# Patient Record
Sex: MALE | Race: WHITE | NOT HISPANIC OR LATINO | Employment: OTHER | URBAN - METROPOLITAN AREA
[De-identification: names, ages, dates, MRNs, and addresses within clinical notes are randomized per-mention and may not be internally consistent; named-entity substitution may affect disease eponyms.]

---

## 2018-08-21 ENCOUNTER — OFFICE VISIT CONVERTED (OUTPATIENT)
Dept: ORTHOPEDIC SURGERY | Facility: CLINIC | Age: 58
End: 2018-08-21
Attending: ORTHOPAEDIC SURGERY

## 2018-09-20 ENCOUNTER — OFFICE VISIT CONVERTED (OUTPATIENT)
Dept: ORTHOPEDIC SURGERY | Facility: CLINIC | Age: 58
End: 2018-09-20
Attending: PHYSICIAN ASSISTANT

## 2018-09-20 ENCOUNTER — CONVERSION ENCOUNTER (OUTPATIENT)
Dept: ORTHOPEDIC SURGERY | Facility: CLINIC | Age: 58
End: 2018-09-20

## 2018-10-04 ENCOUNTER — OFFICE VISIT CONVERTED (OUTPATIENT)
Dept: ORTHOPEDIC SURGERY | Facility: CLINIC | Age: 58
End: 2018-10-04
Attending: PHYSICIAN ASSISTANT

## 2018-10-24 ENCOUNTER — OFFICE VISIT CONVERTED (OUTPATIENT)
Dept: SURGERY | Facility: CLINIC | Age: 58
End: 2018-10-24
Attending: SURGERY

## 2018-11-15 ENCOUNTER — OFFICE VISIT CONVERTED (OUTPATIENT)
Dept: ORTHOPEDIC SURGERY | Facility: CLINIC | Age: 58
End: 2018-11-15
Attending: PHYSICIAN ASSISTANT

## 2019-03-07 ENCOUNTER — OFFICE VISIT CONVERTED (OUTPATIENT)
Dept: ORTHOPEDIC SURGERY | Facility: CLINIC | Age: 59
End: 2019-03-07
Attending: ORTHOPAEDIC SURGERY

## 2019-03-07 ENCOUNTER — CONVERSION ENCOUNTER (OUTPATIENT)
Dept: ORTHOPEDIC SURGERY | Facility: CLINIC | Age: 59
End: 2019-03-07

## 2019-06-12 ENCOUNTER — HOSPITAL ENCOUNTER (OUTPATIENT)
Dept: FAMILY MEDICINE CLINIC | Facility: CLINIC | Age: 59
Discharge: HOME | End: 2019-06-12
Attending: FAMILY MEDICINE

## 2019-06-12 ENCOUNTER — CONVERSION ENCOUNTER (OUTPATIENT)
Dept: FAMILY MEDICINE CLINIC | Facility: CLINIC | Age: 59
End: 2019-06-12

## 2019-06-12 ENCOUNTER — OFFICE VISIT CONVERTED (OUTPATIENT)
Dept: FAMILY MEDICINE CLINIC | Facility: CLINIC | Age: 59
End: 2019-06-12
Attending: FAMILY MEDICINE

## 2019-06-12 LAB
ALBUMIN SERPL-MCNC: 4.5 G/DL (ref 3.5–5)
ALBUMIN/GLOB SERPL: 1.9 {RATIO} (ref 1.4–2.6)
ALP SERPL-CCNC: 67 U/L (ref 56–119)
ALT SERPL-CCNC: 16 U/L (ref 10–40)
ANION GAP SERPL CALC-SCNC: 19 MMOL/L (ref 8–19)
AST SERPL-CCNC: 18 U/L (ref 15–50)
BASOPHILS # BLD AUTO: 0.03 10*3/UL (ref 0–0.2)
BASOPHILS NFR BLD AUTO: 0.3 % (ref 0–3)
BILIRUB SERPL-MCNC: 0.39 MG/DL (ref 0.2–1.3)
BUN SERPL-MCNC: 18 MG/DL (ref 5–25)
BUN/CREAT SERPL: 16 {RATIO} (ref 6–20)
CALCIUM SERPL-MCNC: 9.4 MG/DL (ref 8.7–10.4)
CHLORIDE SERPL-SCNC: 103 MMOL/L (ref 99–111)
CHOLEST SERPL-MCNC: 193 MG/DL (ref 107–200)
CHOLEST/HDLC SERPL: 4.3 {RATIO} (ref 3–6)
CONV ABS IMM GRAN: 0.05 10*3/UL (ref 0–0.2)
CONV CO2: 25 MMOL/L (ref 22–32)
CONV IMMATURE GRAN: 0.5 % (ref 0–1.8)
CONV TOTAL PROTEIN: 6.9 G/DL (ref 6.3–8.2)
CREAT UR-MCNC: 1.1 MG/DL (ref 0.7–1.2)
DEPRECATED RDW RBC AUTO: 42.1 FL (ref 35.1–43.9)
EOSINOPHIL # BLD AUTO: 0 % (ref 0–7)
EOSINOPHIL # BLD AUTO: 0 10*3/UL (ref 0–0.7)
ERYTHROCYTE [DISTWIDTH] IN BLOOD BY AUTOMATED COUNT: 12.3 % (ref 11.6–14.4)
GFR SERPLBLD BASED ON 1.73 SQ M-ARVRAT: >60 ML/MIN/{1.73_M2}
GLOBULIN UR ELPH-MCNC: 2.4 G/DL (ref 2–3.5)
GLUCOSE SERPL-MCNC: 102 MG/DL (ref 70–99)
HBA1C MFR BLD: 15.1 G/DL (ref 14–18)
HCT VFR BLD AUTO: 46.4 % (ref 42–52)
HDLC SERPL-MCNC: 45 MG/DL (ref 40–60)
LDLC SERPL CALC-MCNC: 126 MG/DL (ref 70–100)
LYMPHOCYTES # BLD AUTO: 3.14 10*3/UL (ref 1–5)
MCH RBC QN AUTO: 30.4 PG (ref 27–31)
MCHC RBC AUTO-ENTMCNC: 32.5 G/DL (ref 33–37)
MCV RBC AUTO: 93.4 FL (ref 80–96)
MONOCYTES # BLD AUTO: 0.67 10*3/UL (ref 0.2–1.2)
MONOCYTES NFR BLD AUTO: 7 % (ref 3–10)
NEUTROPHILS # BLD AUTO: 5.69 10*3/UL (ref 2–8)
NEUTROPHILS NFR BLD AUTO: 59.4 % (ref 30–85)
NRBC CBCN: 0 % (ref 0–0.7)
OSMOLALITY SERPL CALC.SUM OF ELEC: 298 MOSM/KG (ref 273–304)
PLATELET # BLD AUTO: 239 10*3/UL (ref 130–400)
PMV BLD AUTO: 11.8 FL (ref 9.4–12.4)
POTASSIUM SERPL-SCNC: 3.8 MMOL/L (ref 3.5–5.3)
PSA SERPL-MCNC: 0.43 NG/ML (ref 0–4)
RBC # BLD AUTO: 4.97 10*6/UL (ref 4.7–6.1)
SODIUM SERPL-SCNC: 143 MMOL/L (ref 135–147)
TRIGL SERPL-MCNC: 108 MG/DL (ref 40–150)
VARIANT LYMPHS NFR BLD MANUAL: 32.8 % (ref 20–45)
VLDLC SERPL-MCNC: 22 MG/DL (ref 5–37)
WBC # BLD AUTO: 9.58 10*3/UL (ref 4.8–10.8)

## 2019-06-13 LAB — HCV AB SER DONR QL: <0.1 S/CO RATIO (ref 0–0.9)

## 2019-07-01 ENCOUNTER — HOSPITAL ENCOUNTER (OUTPATIENT)
Dept: URGENT CARE | Facility: CLINIC | Age: 59
Discharge: HOME | End: 2019-07-01
Attending: EMERGENCY MEDICINE

## 2019-08-21 ENCOUNTER — HOSPITAL ENCOUNTER (OUTPATIENT)
Dept: URGENT CARE | Facility: CLINIC | Age: 59
Discharge: HOME | End: 2019-08-21

## 2019-09-09 ENCOUNTER — HOSPITAL ENCOUNTER (OUTPATIENT)
Dept: URGENT CARE | Facility: CLINIC | Age: 59
Discharge: HOME | End: 2019-09-09
Attending: EMERGENCY MEDICINE

## 2019-12-31 ENCOUNTER — CONVERSION ENCOUNTER (OUTPATIENT)
Dept: ORTHOPEDIC SURGERY | Facility: CLINIC | Age: 59
End: 2019-12-31

## 2019-12-31 ENCOUNTER — OFFICE VISIT CONVERTED (OUTPATIENT)
Dept: ORTHOPEDIC SURGERY | Facility: CLINIC | Age: 59
End: 2019-12-31
Attending: PHYSICIAN ASSISTANT

## 2020-01-14 ENCOUNTER — OFFICE VISIT CONVERTED (OUTPATIENT)
Dept: ORTHOPEDIC SURGERY | Facility: CLINIC | Age: 60
End: 2020-01-14
Attending: PHYSICIAN ASSISTANT

## 2020-01-21 ENCOUNTER — HOSPITAL ENCOUNTER (OUTPATIENT)
Dept: URGENT CARE | Facility: CLINIC | Age: 60
Discharge: HOME | End: 2020-01-21

## 2020-07-21 ENCOUNTER — OFFICE VISIT CONVERTED (OUTPATIENT)
Dept: ORTHOPEDIC SURGERY | Facility: CLINIC | Age: 60
End: 2020-07-21
Attending: ORTHOPAEDIC SURGERY

## 2020-09-17 ENCOUNTER — HOSPITAL ENCOUNTER (OUTPATIENT)
Dept: URGENT CARE | Facility: CLINIC | Age: 60
Discharge: HOME | End: 2020-09-17
Attending: EMERGENCY MEDICINE

## 2020-09-20 LAB — SARS-COV-2 RNA SPEC QL NAA+PROBE: NOT DETECTED

## 2020-10-22 ENCOUNTER — OFFICE VISIT CONVERTED (OUTPATIENT)
Dept: ORTHOPEDIC SURGERY | Facility: CLINIC | Age: 60
End: 2020-10-22
Attending: ORTHOPAEDIC SURGERY

## 2020-11-16 ENCOUNTER — OFFICE VISIT CONVERTED (OUTPATIENT)
Dept: FAMILY MEDICINE CLINIC | Facility: CLINIC | Age: 60
End: 2020-11-16
Attending: FAMILY MEDICINE

## 2020-11-16 ENCOUNTER — CONVERSION ENCOUNTER (OUTPATIENT)
Dept: FAMILY MEDICINE CLINIC | Facility: CLINIC | Age: 60
End: 2020-11-16

## 2020-11-19 ENCOUNTER — HOSPITAL ENCOUNTER (OUTPATIENT)
Dept: URGENT CARE | Facility: CLINIC | Age: 60
Discharge: HOME | End: 2020-11-19
Attending: FAMILY MEDICINE

## 2020-11-21 LAB — SARS-COV-2 RNA SPEC QL NAA+PROBE: NOT DETECTED

## 2021-01-21 ENCOUNTER — OFFICE VISIT CONVERTED (OUTPATIENT)
Dept: ORTHOPEDIC SURGERY | Facility: CLINIC | Age: 61
End: 2021-01-21
Attending: PHYSICIAN ASSISTANT

## 2021-02-01 ENCOUNTER — OFFICE VISIT CONVERTED (OUTPATIENT)
Dept: FAMILY MEDICINE CLINIC | Facility: CLINIC | Age: 61
End: 2021-02-01
Attending: FAMILY MEDICINE

## 2021-02-08 ENCOUNTER — OFFICE VISIT CONVERTED (OUTPATIENT)
Dept: SURGERY | Facility: CLINIC | Age: 61
End: 2021-02-08
Attending: SURGERY

## 2021-02-08 ENCOUNTER — CONVERSION ENCOUNTER (OUTPATIENT)
Dept: SURGERY | Facility: CLINIC | Age: 61
End: 2021-02-08

## 2021-02-13 ENCOUNTER — HOSPITAL ENCOUNTER (OUTPATIENT)
Dept: PREADMISSION TESTING | Facility: HOSPITAL | Age: 61
Discharge: HOME | End: 2021-02-13
Attending: SURGERY

## 2021-02-13 LAB — SARS-COV-2 RNA SPEC QL NAA+PROBE: NOT DETECTED

## 2021-02-18 ENCOUNTER — HOSPITAL ENCOUNTER (OUTPATIENT)
Dept: PERIOP | Facility: HOSPITAL | Age: 61
Setting detail: HOSPITAL OUTPATIENT SURGERY
Discharge: HOME | End: 2021-02-18
Attending: SURGERY

## 2021-03-08 ENCOUNTER — OFFICE VISIT CONVERTED (OUTPATIENT)
Dept: SURGERY | Facility: CLINIC | Age: 61
End: 2021-03-08
Attending: SURGERY

## 2021-03-17 ENCOUNTER — HOSPITAL ENCOUNTER (OUTPATIENT)
Dept: URGENT CARE | Facility: CLINIC | Age: 61
Discharge: HOME | End: 2021-03-17
Attending: FAMILY MEDICINE

## 2021-04-27 ENCOUNTER — HOSPITAL ENCOUNTER (OUTPATIENT)
Dept: URGENT CARE | Facility: CLINIC | Age: 61
Discharge: HOME | End: 2021-04-27
Attending: EMERGENCY MEDICINE

## 2021-05-10 NOTE — H&P
History and Physical      Patient Name: Leandro Salcedo   Patient ID: [id number]   Sex: Male   YOB: 1960    Primary Care Provider: Christofer Lagos DO   Referring Provider: Christofer Lagos DO    Visit Date: February 8, 2021    Provider: Stephen Garcia MD   [facility]   Location Address: [address] [place] [address]  [id number]   Location Phone: [phone]          Chief Complaint  · Outpatient History & Physical / Surgical Orders  · Lipoma      History Of Present Illness  Leandro Salcedo is a 60 year old /White male who presents to the office today as a consult from Christofer Lagos DO.      Patient was referred for chest wall subcutaneous masses.  Patient has a mass at his left chest wall in the subcutaneous tissue that he says gives him pain when he lays on his left side.  As will be mentioned in the below exam section, there is a surgical scar over the mass but the patient says he has never had any procedure done at the area.  Patient also has a smaller subcutaneous mass on the right side as well that he wants me to look at and he would like that to be removed too.       Past Medical History  Disease Name Date Onset Notes   Allergies --  --    Arthritis --  --    Colon cancer screening --  --    Headache, cluster --  --    Hernia --  --    High blood pressure --  --    High cholesterol --  --    Migraine --  --    Need for hepatitis C screening test 2017 neg per patient   Need for shingles vaccine done --    Prostate cancer screening --  6/12/2019-PSA 0.43   Renal calculus or stone --  --    Right 1st CMC primary osteoarthritis 07/25/2017 --    Right de Quervain's 07/25/2017 --          Past Surgical History  Procedure Name Date Notes   Ankle surgery --  --    Colonoscopy 2018 --    EYE SURGERY --  --    Inguinal Hernia Repair --  right 04/07/2015   Joint Surgery --  --    Knee replacement, left --  --    Shoulder surgery --  --    Tonsillectomy --  --           Medication List  Name Date Started Instructions   Aimovig Autoinjector 70 mg/mL subcutaneous auto-injector 06/12/2019 inject 1 milliliter (70 mg) by subcutaneous route once a month in the abdomen, thigh, or outer area of upper arm for 30 days   aspirin 81 mg oral tablet,delayed release (DR/EC)  take 1 tablet (81 mg) by oral route once daily   diclofenac sodium 75 mg oral tablet,delayed release (DR/EC) 02/01/2021 take 1 tablet (75 mg) by oral route 2 times per day as needed   Fioricet -40 mg oral capsule  take 1 capsule by oral route every 8 hours as needed   lidocaine 5 % topical adhesive patch,medicated  apply 1 patch by transdermal route once daily (May wear up to 12hours.)   lidocaine 5 % topical cream 02/01/2021 apply to affected area(s) by topical route TID PRN   lisinopril-hydrochlorothiazide 20-25 mg oral tablet  take 1 tablet by oral route once daily   Robaxin 500 mg oral tablet  take 2 tablets by oral route once a day (at bedtime)   simvastatin 10 mg oral tablet  take 1 tablet (10 mg) by oral route once daily in the evening   tramadol 50 mg oral tablet  take 1 tablet by oral route once a day (at bedtime) as needed   Voltaren 1 % topical gel 11/16/2020 apply 2 grams to the affected area(s) by topical route 4 times per day for 30 days         Allergy List  Allergen Name Date Reaction Notes   Tigan --  quit breathing --        Allergies Reconciled  Family Medical History  Disease Name Relative/Age Notes   *Unremarkable  --    No family history of colorectal cancer  --          Social History  Finding Status Start/Stop Quantity Notes   Alcohol Never --/-- --  --    Alcohol Use Never --/-- --  does not drink   lives with spouse --  --/-- --  --     --  --/-- --  3 children   . --  --/-- --  --    Recreational Drug Use Never --/-- --  no   Retired [employer] --  --/-- --  --    Retired. --  --/-- --  --    Tobacco Never --/-- --  never smoker   Working --  --/-- --  --   "        Immunizations  NameDate Admin Mfg Trade Name Lot Number Route Inj VIS Given VIS Publication   Hrdamveig46/04/2019 [facility] Fluzone Quadrivalent [id number] IM LD 10/04/2019    Comments: TOLERATED WELL STABLE         Review of Systems  · Constitutional  o Denies  o : chills, fever  · Gastrointestinal  o Denies  o : nausea, vomiting      Vitals  Date Time BP Position Site L\R Cuff Size HR RR TEMP (F) WT  HT  BMI kg/m2 BSA m2 O2 Sat FR L/min FiO2 HC       02/08/2021 10:03 AM       12  189lbs 4oz 5'  6.5\" 30.09 2.01             Physical Examination  · Constitutional  o Appearance  o : healthy appearing, alert and in no acute distress, reliable historian, wife present in room  · Head and Face  o Head  o :   § Inspection  § : no visable deformities or lesions  · Eyes  o Conjunctivae  o : clear  o Sclerae  o : clear  · Neck  o Inspection/Palpation  o : normal appearance, no masses, trachea midline  · Respiratory  o Respiratory Effort  o : breathing unlabored, respiratory effort appears normal  o Inspection of Chest  o : normal appearance, no retractions  · Cardiovascular  o Heart  o : regular rate and rhythm  · Gastrointestinal  o Abdominal Examination  o :   § Abdomen  § : soft, nontender, nondistended  · Skin and Subcutaneous Tissue  o General Inspection  o : The left lateral chest wall, there is an approximately 2 cm fairly well-circumscribed subcutaneous mass with a surgical scar in the skin over the mass. The mass is tender with palpation. At the right lateral chest wall, there is a palpable subcutaneous mass about 1 cm in diameter that is mobile and well-circumscribed.  · Neurologic  o Cranial Nerves  o : no obvious motor deficits  o Sensation  o : no obvious sensory deficits  o Gait and Station  o :   § Gait Screening  § : normal gait, able to stand without diffculty  o Cerebellar Function  o : no obvious abnormalities  · Psychiatric  o Judgement and Insight  o : judgment and insight intact  o Mood and Affect  o : " mood normal, affect appropriate          Assessment  · Pre-Surgical Orders     V72.84  · Subcutaneous mass     782.2/R22.9  Location is left and right lateral chest wall  · Preop testing     V72.84/Z01.818      Plan  · Orders  o GENERAL SURGERY (GNSUR) - V72.84 - 02/18/2021  o [facility] [place] (11454[place] - V72.84/Z01.818 - 02/13/2021   at 8:15am  · Medications  o Medications have been Reconciled  o Transition of Care or Provider Policy  · Instructions  o PLAN: Excision of subcutaneous mass from right and left lateral chest wall  o PLEASE SIGN PERMIT FOR: Excision of subcutaneous mass from right and left lateral chest wall  o Anesthesia: MAC  o Outpatient  o O.R. PREP: Per protocol  o IV: Per Anesthesia  o SCD's preoperatively  o No antibiotic is needed.  o The indications, options, risks, benefits, and expected outcomes of the planned procedure were discussed with the patient and the patient agrees to proceed.   o Electronically Identified Patient Education Materials Provided Electronically  · Referrals  o ID: [id number] Date: 02/04/2021 Type: Inbound  Specialty: General Surgery            Electronically Signed by: Stephen Garcia MD -Author on February 8, 2021 10:47:08 AM

## 2021-05-13 NOTE — PROGRESS NOTES
Progress Note      Patient Name: Leandro Salcedo   Patient ID: [id number]   Sex: Male   YOB: 1960    Primary Care Provider: Christofer Lagos DO   Referring Provider: Christofer Lagos DO    Visit Date: November 16, 2020    Provider: Christofer Lagos DO   Location: [facility]   Location Address: [address] [place] [address], [address] [place] [address]  [id number]   Location Phone: [phone]          Chief Complaint  · rib pain      History Of Present Illness  Leandro Salcedo is a 60 year old /White male who presents for evaluation and treatment of:      She presents today for an acute visit.  He describes bilateral rib pain.  This started about 2 months ago.  Denies any known injury.  Denies any pain with deep breaths.  He thinks he may have strained a muscle.  Denies any shortness of breath.  He did go to urgent care on 9/17/2020 and had a chest x-ray done which was normal except for some mild degenerative spurring in the thoracic spine with no acute process seen.  There was concern about atypical pneumonia so he was given a Z-Kevin, prednisone, and Motrin which none of it helped.  He did have a Covid test also done which was negative.  He continues to have symptoms.  He tried BenGay as well as Voltaren gel.  He still has some Voltaren gel so is not requesting a prescription today.  Discussed with him getting a rib series.  He is having reproducible tenderness to palpation along the anterior axillary line on the left chest wall and along the posterior axillary line on the right chest wall.       Past Medical History  Allergies; Arthritis; Colon cancer screening; Headache, cluster; Hernia; High blood pressure; High cholesterol; Migraine; Need for hepatitis C screening test; Need for shingles vaccine; Prostate cancer screening; Renal calculus or stone; Right 1st CMC primary osteoarthritis; Right de Quervain's         Past Surgical History  Ankle surgery; Colonoscopy; EYE SURGERY; Inguinal  "Hernia Repair; Joint Surgery; Knee replacement, left; Shoulder surgery; Tonsillectomy         Medication List  Aimovig Autoinjector 70 mg/mL subcutaneous auto-injector; aspirin 81 mg oral tablet,delayed release (DR/EC); diclofenac sodium 75 mg oral tablet,delayed release (DR/EC); Fioricet -40 mg oral capsule; lidocaine 5 % topical adhesive patch,medicated; lidocaine 5 % topical cream; lisinopril-hydrochlorothiazide 20-25 mg oral tablet; Robaxin 500 mg oral tablet; simvastatin 10 mg oral tablet; tramadol 50 mg oral tablet         Allergy List  [facility]         Family Medical History  *Unremarkable; No family history of colorectal cancer         Social History  Alcohol (Never); Alcohol Use (Never); lives with spouse; ; .; Recreational Drug Use (Never); Retired [employer]; Retired.; Tobacco (Never); Working         Immunizations  Name Date Admin   Influenza 10/04/2019         Review of Systems     Gen: Denies any fever, chills, or weight changes  Extremities: Denies edema  Neurologic: Denies any deficits  Skin: Denies any rashes  Musculoskeletal: As discussed above       Vitals  Date Time BP Position Site L\R Cuff Size HR RR TEMP (F) WT  HT  BMI kg/m2 BSA m2 O2 Sat FR L/min FiO2 HC       11/16/2020 03:04 /82 Sitting    55 - R  97.4 189lbs 8oz 5'  6.5\" 30.13 2.01 99 %            Physical Examination     General: AAO 3, no acute distress, pleasant  HEENT: Normocephalic, atraumatic  Cardiovascular: Regular rate and rhythm without appreciable murmur  Respiratory: Clear to auscultation bilaterally no RRW  Musculoskeletal: Reproducible tenderness to palpation on the anterior axillary line on the left ribs in the area of the 6 to the 10th rib.  On the right it is along the posterior axillary line at the level of the eighth rib.  No mass or lesion seen.  His costochondral region is nontender to palpation.  He has no tenderness to palpation over the thoracic spine area.  extremities: No clubbing, cyanosis or " edema  Neurologic: CN II through XII grossly intact   Psychiatric: Normal mood and affect           Assessment  · Rib pain on left side     786.50/R07.81  · Rib pain on right side     786.50/R07.81  · Muscle strain     848.9/T14.8XXA  I suspect a muscle strain as he does have reproducible tenderness. I will get an x-ray to rule out any rib fractures. Discussed setting him up for physical therapy. I will see him back in 2 months for follow-up. I will give patient a prednisone taper to help out with pain/inflammation.      Plan  · Orders  o ACO-39: Current medications updated and reviewed (1159F, ) - - 11/16/2020  o ACO-14: Influenza immunization administered or previously received [facility] () - - 11/16/2020  o Xray ribs with PA chest left [facility] (69562) - 786.50/R07.81 - 11/16/2020   pain started 2 months ago. no known injury. Pain along anterior axillary line on the left  o xray ribs with PA chest right [facility] ([id number]) - 786.50/R07.81 - 11/16/2020   pain started 2 months ago. NO known injury. Pain along posterior axillary line  o PHYSICAL THERAPY CONSULTATION ([facility]) - 786.50/R07.81, 848.9/T14.8XXA - 11/16/2020  · Medications  o Voltaren 1 % topical gel   SIG: apply 2 grams to the affected area(s) by topical route 4 times per day for 30 days   DISP: (100) Gram with 0 refills  Prescribed on 11/16/2020     o prednisone 20 mg oral tablet   SIG: take 2 tablets PO daily x 5 days, then take 1 tablet PO daily x 5 days, then take 1/2 tablet PO daily x 5 days   DISP: (18) Tablet with 0 refills  Prescribed on 11/16/2020     · Instructions  o Patient was educated/instructed on their diagnosis, treatment and medications prior to discharge from the clinic today.  o Patient instructed to seek medical attention urgently for new or worsening symptoms.  o Call the office with any concerns or questions.  · Disposition  o Follow Up in 2 months.            Electronically Signed by: Christofer Lagos DO  -Author on November 16, 2020 03:46:09 PM

## 2021-05-13 NOTE — PROGRESS NOTES
Progress Note      Patient Name: Leandro Salcedo   Patient ID: [id number]   Sex: Male   YOB: 1960    Primary Care Provider: Christofer Lagos DO   Referring Provider: Christofer Lagos DO    Visit Date: July 21, 2020    Provider: Farideh Rucker MD   Location: [facility]   Location Address: [address] [place] [address]  [id number]   Location Phone: [phone]          Chief Complaint  · Follow up right shoulder/wrist pain      History Of Present Illness  Leandro Salcedo is a 60 year old /White male who presents today to [facility]. He is here for evaluation of his right shoulder and right de quervains. He has been having increasing symptoms.       Past Medical History  Allergies; Arthritis; Colon cancer screening; Headache, cluster; Hernia; High blood pressure; High cholesterol; Migraine; Need for hepatitis C screening test; Need for shingles vaccine; Prostate cancer screening; Renal calculus or stone; Right 1st CMC primary osteoarthritis; Right de Quervain's         Past Surgical History  Ankle surgery; Colonoscopy; EYE SURGERY; Inguinal Hernia Repair; Joint Surgery; Knee replacement, left; Shoulder surgery; Tonsillectomy         Medication List  Aimovig Autoinjector 70 mg/mL subcutaneous auto-injector; aspirin 81 mg oral tablet,delayed release (DR/EC); diclofenac sodium 75 mg oral tablet,delayed release (DR/EC); Fioricet -40 mg oral capsule; lidocaine 5 % topical adhesive patch,medicated; lidocaine 5 % topical cream; lisinopril-hydrochlorothiazide 20-25 mg oral tablet; Robaxin 500 mg oral tablet; simvastatin 10 mg oral tablet; tramadol 50 mg oral tablet         Allergy List  Tigan         Family Medical History  *Unremarkable; No family history of colorectal cancer         Social History  Alcohol (Never); Alcohol Use (Never); lives with spouse; ; .; Recreational Drug Use (Never); Retired [employer]; Retired.; Tobacco (Never); Working         Review of  "Systems  · Constitutional  o Denies  o : fever, chills, weight loss  · Cardiovascular  o Denies  o : chest pain, shortness of breath  · Gastrointestinal  o Denies  o : liver disease, heartburn, nausea, blood in stools  · Genitourinary  o Denies  o : painful urination, blood in urine  · Integument  o Denies  o : rash, itching  · Neurologic  o Denies  o : headache, weakness, loss of consciousness  · Musculoskeletal  o Denies  o : painful, swollen joints  · Psychiatric  o Denies  o : drug/alcohol addiction, anxiety, depression      Vitals  Date Time BP Position Site L\R Cuff Size HR RR TEMP (F) WT  HT  BMI kg/m2 BSA m2 O2 Sat        07/21/2020 04:08 PM      72 - R   176lbs 16oz 5'  6\" 28.57 1.93 97 %          Physical Examination  · Constitutional  o Appearance  o : well developed, well-nourished, no obvious deformities present  · Head and Face  o Head  o :   § Inspection  § : normocephalic  o Face  o :   § Inspection  § : no facial lesions  · Eyes  o Conjunctivae  o : conjunctivae normal  o Sclerae  o : sclerae white  · Ears, Nose, Mouth and Throat  o Ears  o :   § External Ears  § : appearance within normal limits  § Hearing  § : intact  o Nose  o :   § External Nose  § : appearance normal  · Neck  o Inspection/Palpation  o : normal appearance  o Range of Motion  o : full range of motion  · Respiratory  o Respiratory Effort  o : breathing unlabored  o Inspection of Chest  o : normal appearance  o Auscultation of Lungs  o : no audible wheezing or rales  · Cardiovascular  o Heart  o : regular rate  · Gastrointestinal  o Abdominal Examination  o : soft and non-tender  · Skin and Subcutaneous Tissue  o General Inspection  o : intact, no rashes  · Psychiatric  o General  o : Alert and oriented x3  o Judgement and Insight  o : judgment and insight intact  o Mood and Affect  o : mood normal, affect appropriate  · Right Shoulder  o Inspection  o : He has positive impingement test. Sensation intact. Pulse is normal. Tender " to palpation.  · Right Wrist  o Inspection  o : He has positive Finkelsteins test. Tender to palpation over his 1st dorsal compartment. Good finger range of motion.   · Injection Note/Aspiration Note  o Site  o : right shoulder/right wrist  o Procedure  o : Procedure: After educating the patient, patient gave consent for procedure. After using Chloraprep, the joint space was injected. The patient tolerated the procedure well.   o Medication  o : 80 mg of DepoMedrol with 9cc of 1% Lidocaine/80mg DepoMedrol with 1 cc of 1% lidocaine          Assessment  · Pain: Right Shoulder     719.41/M25.519  · Pain: Right Wrist     719.43/M25.539  · Shoulder impingement syndrome, right     726.2/M75.41  · De Quervain's , right wrist     814.01/S62.001A      Plan  · Orders  o Depo-Medrol injection 80mg () - 719.41/M25.519 - 07/21/2020   Lot [id number] manufactured by [employer] 07 2021  o Shoulder Intra-articular Injection without US Guidance [facility] (20610) - 719.41/M25.519 - 07/21/2020   Lot [id number] manufactured by [employer] 02 2022 Administered by Farideh Rucker MD  o Depo-Medrol injection 80mg () - 719.43/M25.539 - 07/21/2020   Lot [id number] manufactured by [employer] 07 2021  o Arthrocentesis of wrist (20605) - 719.43/M25.539 - 07/21/2020   Lot [id number] manufactured by [employer] 02 2022 Administered by Farideh Rucker MD  · Medications  o Medications have been Reconciled  o Transition of Care or Provider Policy  · Instructions  o Reviewed the patient's Past Medical, Social, and Family history as well as the ROS at today's visit, no changes.  o Call or return if worsening symptoms.  o This note is transcribed by Marleny Egan /[email or web address] Going to set him up for an injection. See him back and see how he is doing.             Electronically Signed by: Marleny Egan, -Author on July 24, 2020 02:17:20 PM  Electronically Co-signed by: Farideh Rucker MD -Reviewer on July 24, 2020 05:14:47 PM

## 2021-05-13 NOTE — PROGRESS NOTES
Progress Note      Patient Name: Leandro Salcedo   Patient ID: [id number]   Sex: Male   YOB: 1960    Primary Care Provider: Christofer Lagos DO   Referring Provider: Christofer Lagos DO    Visit Date: October 22, 2020    Provider: Farideh Rucker MD   Location: [facility]   Location Address: [address] [place] [address]  [id number]   Location Phone: [phone]          Chief Complaint  · Follow up Bilateral Wrist Pain/Bilateral de Quervain's Tenosynovitis.       History Of Present Illness  Leandro Salcedo is a 60 year old /White male who presents today for followup of bilateral wrist pain and bilateral de Quervain's tenosynovitis. Patient states his right thumb pain has increased over the last year. Patient states over the last three months, his left thumb has increased in pain. 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 Patient is requesting steroid injections today.       Past Medical History  Allergies; Arthritis; Colon cancer screening; Headache, cluster; Hernia; High blood pressure; High cholesterol; Migraine; Need for hepatitis C screening test; Need for shingles vaccine; Prostate cancer screening; Renal calculus or stone; Right 1st CMC primary osteoarthritis; Right de Quervain's         Past Surgical History  Ankle surgery; Colonoscopy; EYE SURGERY; Inguinal Hernia Repair; Joint Surgery; Knee replacement, left; Shoulder surgery; Tonsillectomy         Medication List  Aimovig Autoinjector 70 mg/mL subcutaneous auto-injector; aspirin 81 mg oral tablet,delayed release (DR/EC); diclofenac sodium 75 mg oral tablet,delayed release (DR/EC); Fioricet -40 mg oral capsule; lidocaine 5 % topical adhesive patch,medicated; lidocaine 5 % topical cream; lisinopril-hydrochlorothiazide 20-25 mg oral tablet; Robaxin 500 mg oral tablet; simvastatin 10 mg oral tablet; tramadol 50 mg oral tablet         Allergy List  Tigan         Family Medical History  *Unremarkable; No  "family history of colorectal cancer         Social History  Alcohol (Never); Alcohol Use (Never); lives with spouse; ; .; Recreational Drug Use (Never); Retired [employer]; Retired.; Tobacco (Never); Working         Review of Systems  · Constitutional  o Denies  o : fever, chills, weight loss  · Cardiovascular  o Denies  o : chest pain, shortness of breath  · Gastrointestinal  o Denies  o : liver disease, heartburn, nausea, blood in stools  · Genitourinary  o Denies  o : painful urination, blood in urine  · Integument  o Denies  o : rash, itching  · Neurologic  o Denies  o : headache, weakness, loss of consciousness  · Musculoskeletal  o Admits  o : painful, swollen joints  · Psychiatric  o Denies  o : drug/alcohol addiction, anxiety, depression      Vitals  Date Time BP Position Site L\R Cuff Size HR RR TEMP (F) WT  HT  BMI kg/m2 BSA m2 O2 Sat FR L/min FiO2        10/22/2020 03:32 PM      60 - R   176lbs 16oz 5'  6\" 28.57 1.93 97 %            Physical Examination  · Constitutional  o Appearance  o : well developed, well-nourished, no obvious deformities present  · Head and Face  o Head  o :   § Inspection  § : normocephalic  o Face  o :   § Inspection  § : no facial lesions  · Eyes  o Conjunctivae  o : conjunctivae normal  o Sclerae  o : sclerae white  · Ears, Nose, Mouth and Throat  o Ears  o :   § External Ears  § : appearance within normal limits  § Hearing  § : intact  o Nose  o :   § External Nose  § : appearance normal  · Neck  o Inspection/Palpation  o : normal appearance  o Range of Motion  o : full range of motion  · Respiratory  o Respiratory Effort  o : breathing unlabored  o Inspection of Chest  o : normal appearance  o Auscultation of Lungs  o : no audible wheezing or rales  · Cardiovascular  o Heart  o : regular rate  · Gastrointestinal  o Abdominal Examination  o : soft and non-tender  · Skin and Subcutaneous Tissue  o General Inspection  o : intact, no rashes  · Psychiatric  o General  o : " Alert and oriented x3  o Judgement and Insight  o : judgment and insight intact  o Mood and Affect  o : mood normal, affect appropriate  · Extremities  o Extremities  o : BILATERAL THUMB: No skin discoloration, atrophy, or swelling. Palpable tenderness over abductor pollicis longus and adductor pollicis longus. Pain with thumb flexion and Finkelstein's. Neurovascularly grossly intact. Sensation grossly intact. 2+ radial and ulnar pulses.   · Injection Note/Aspiration Note  o Site  o : bilateral thumb  o Procedure  o : After educating the patient, patient gave consent for procedure. After using Chloraprep, the joint space was injected. The patient tolerated the procedure well.  o Medication  o : 80 mg of DepoMedrol with 1cc of 1% Lidocaine          Assessment  · Bilateral de Quervain's     727.04/M65.4  · Pain: Hand     719.44/M79.643  · Pain: Wrist     719.43/M25.539  · Pain of left thumb     729.5/M79.645  · Pain of right thumb     729.5/M79.644      Plan  · Orders  o 2 - Depo-Medrol injection 80mg () - 719.44/M79.643 - 10/22/2020   Lot [id number] manufactured by [employer] 08 2021  o 2 - Arthrocentesis of minor joint (20600) - 719.44/M79.643 - 10/22/2020   Lot [id number] manufactured by [employer] 08 2021 Administered by Farideh Rucker MD  · Medications  o Medications have been Reconciled  o Transition of Care or Provider Policy  · Instructions  o Reviewed the patient's Past Medical, Social, and Family history as well as the ROS at today's visit, no changes.  o Call or return if worsening symptoms.  o Exercise handout given.  o Steroid injection bilateral thumb.   o Follow up as needed.  o This note was transcribed by Neeru armenta/jennifer.            Electronically Signed by: Neeru Nguyễn-, Other -Author on October 23, 2020 03:34:14 PM  Electronically Co-signed by: OG Shay-TOÑA -Reviewer on October 26, 2020 08:21:52 AM  Electronically Co-signed by: Farideh Rucker MD -Reviewer on October 27,  2020 07:41:30 AM

## 2021-05-14 VITALS
TEMPERATURE: 98.1 F | DIASTOLIC BLOOD PRESSURE: 74 MMHG | HEIGHT: 66 IN | SYSTOLIC BLOOD PRESSURE: 104 MMHG | BODY MASS INDEX: 30.71 KG/M2 | OXYGEN SATURATION: 97 % | HEART RATE: 57 BPM | WEIGHT: 191.12 LBS

## 2021-05-14 VITALS — WEIGHT: 185 LBS | RESPIRATION RATE: 14 BRPM | BODY MASS INDEX: 29.73 KG/M2 | HEIGHT: 66 IN

## 2021-05-14 VITALS — WEIGHT: 180 LBS | OXYGEN SATURATION: 95 % | HEIGHT: 66 IN | HEART RATE: 66 BPM | BODY MASS INDEX: 28.93 KG/M2

## 2021-05-14 VITALS
WEIGHT: 189.5 LBS | HEART RATE: 55 BPM | HEIGHT: 66 IN | OXYGEN SATURATION: 99 % | SYSTOLIC BLOOD PRESSURE: 126 MMHG | BODY MASS INDEX: 30.46 KG/M2 | TEMPERATURE: 97.4 F | DIASTOLIC BLOOD PRESSURE: 82 MMHG

## 2021-05-14 VITALS — WEIGHT: 189.25 LBS | BODY MASS INDEX: 30.41 KG/M2 | RESPIRATION RATE: 12 BRPM | HEIGHT: 66 IN

## 2021-05-14 VITALS — OXYGEN SATURATION: 97 % | BODY MASS INDEX: 28.45 KG/M2 | HEART RATE: 60 BPM | WEIGHT: 177 LBS | HEIGHT: 66 IN

## 2021-05-14 NOTE — PROGRESS NOTES
Progress Note      Patient Name: Leandro Salcedo   Patient ID: [id number]   Sex: Male   YOB: 1960    Primary Care Provider: Christofer Lagos DO   Referring Provider: Christofer Lagos DO    Visit Date: January 21, 2021    Provider: Taylor Ritter PA-C   Location: [facility]   Location Address: [address] [place] [address]  [id number]   Location Phone: [phone]          Chief Complaint  · Follow up bilateral carpal tunnel syndrome      History Of Present Illness  Leandro Salcedo is a 60 year old /White male who presents today to [facility].      He is here for bilateral wrist pain over radial aspect of wrist and thumb. It is worst with thumb extension/abduction.       Past Medical History  Allergies; Arthritis; Colon cancer screening; Headache, cluster; Hernia; High blood pressure; High cholesterol; Migraine; Need for hepatitis C screening test; Need for shingles vaccine; Prostate cancer screening; Renal calculus or stone; Right 1st CMC primary osteoarthritis; Right de Quervain's         Past Surgical History  Ankle surgery; Colonoscopy; EYE SURGERY; Inguinal Hernia Repair; Joint Surgery; Knee replacement, left; Shoulder surgery; Tonsillectomy         Medication List  Aimovig Autoinjector 70 mg/mL subcutaneous auto-injector; aspirin 81 mg oral tablet,delayed release (DR/EC); diclofenac sodium 75 mg oral tablet,delayed release (DR/EC); Fioricet -40 mg oral capsule; lidocaine 5 % topical adhesive patch,medicated; lidocaine 5 % topical cream; lisinopril-hydrochlorothiazide 20-25 mg oral tablet; prednisone 20 mg oral tablet; Robaxin 500 mg oral tablet; simvastatin 10 mg oral tablet; tramadol 50 mg oral tablet; Voltaren 1 % topical gel         Allergy List  Tigan       Allergies Reconciled  Family Medical History  *Unremarkable; No family history of colorectal cancer         Social History  Alcohol (Never); Alcohol Use (Never); lives with spouse; ; .;  "Recreational Drug Use (Never); Retired [employer]; Retired.; Tobacco (Never); Working         Review of Systems  · Constitutional  o Denies  o : fever, chills, weight loss  · Cardiovascular  o Denies  o : chest pain, shortness of breath  · Gastrointestinal  o Denies  o : liver disease, heartburn, nausea, blood in stools  · Genitourinary  o Denies  o : painful urination, blood in urine  · Integument  o Denies  o : rash, itching  · Neurologic  o Denies  o : headache, weakness, loss of consciousness  · Musculoskeletal  o Admits  o : painful, swollen joints  · Psychiatric  o Denies  o : drug/alcohol addiction, anxiety, depression      Vitals  Date Time BP Position Site L\R Cuff Size HR RR TEMP (F) WT  HT  BMI kg/m2 BSA m2 O2 Sat FR L/min FiO2 HC       01/21/2021 02:56 PM      66 - R   180lbs 0oz 5'  6.5\" 28.62 1.96 95 %            Physical Examination  · Constitutional  o Appearance  o : well developed, well-nourished, no obvious deformities present  · Head and Face  o Head  o :   § Inspection  § : normocephalic  o Face  o :   § Inspection  § : no facial lesions  · Eyes  o Conjunctivae  o : conjunctivae normal  o Sclerae  o : sclerae white  · Ears, Nose, Mouth and Throat  o Ears  o :   § External Ears  § : appearance within normal limits  § Hearing  § : intact  o Nose  o :   § External Nose  § : appearance normal  · Neck  o Inspection/Palpation  o : normal appearance  o Range of Motion  o : full range of motion  · Respiratory  o Respiratory Effort  o : breathing unlabored  o Inspection of Chest  o : normal appearance  o Auscultation of Lungs  o : no audible wheezing or rales  · Cardiovascular  o Heart  o : regular rate  · Gastrointestinal  o Abdominal Examination  o : soft and non-tender  · Skin and Subcutaneous Tissue  o General Inspection  o : intact, no rashes  · Psychiatric  o General  o : Alert and oriented x3  o Judgement and Insight  o : judgment and insight intact  o Mood and Affect  o : mood normal, affect " appropriate  · Extremities  o Extremities  o : BILATERAL THUMB: No skin discoloration, atrophy, or swelling. Palpable tenderness over abductor pollicis longus and adductor pollicis longus. Pain with thumb flexion and Finkelstein's. Neurovascularly grossly intact. Sensation grossly intact. 2+ radial and ulnar pulses.   · Injection Note/Aspiration Note  o Site  o : bilateral wrist   o Procedure  o : Procedure: After educating the patient, patient gave consent for procedure. After using Chloraprep, the joint space was injected. The patient tolerated the procedure well.   o Medication  o : 80 mg of DepoMedrol with 1cc of 1% Lidocaine              Assessment  · Bilateral DeQuervains     727.04/M65.4  · Pain: Wrist     719.43/M25.539      Plan  · Orders  o 2 - Depo-Medrol injection 80mg () - 719.43/M25.539 - 01/21/2021   Lot [id number] exp 11 2021 Rebeca FOLEY  o 2 - Arthrocentesis of wrist (20605) - 719.43/M25.539 - 01/21/2021   Lot [id number] exp 03 2022 \[facility] [place] [facility]\"" Taylor FOLEY  · Instructions  o Reviewed the patient's Past Medical, Social, and Family history as well as the ROS at today's visit, no changes.  o Call or return if worsening symptoms.  o Bilateral first dorsal compartment injection for dequervain's. Follow up as needed.            Electronically Signed by: Taylor Ritter PA-C -Author on January 21, 2021 03:34:23 PM

## 2021-05-14 NOTE — PROGRESS NOTES
Progress Note      Patient Name: Leandro Salcedo   Patient ID: [id number]   Sex: Male   YOB: 1960    Primary Care Provider: Christofer Lagos DO   Referring Provider: Christofer Lagos DO    Visit Date: February 1, 2021    Provider: Christofer Lagos DO   Location: [facility]   Location Address: [address] [place] [address], [address] [place] [address]  [id number]   Location Phone: [phone]          Chief Complaint  · pain      History Of Present Illness  Leandro Salcedo is a 60 year old /White male who presents for evaluation and treatment of:      Patient presents today complaining of left and right-sided rib pain.  I last saw him for this issue on 11/16/2020.  At that time he reports having a 2-month history of left and right rib pain.  He went to urgent care and had a chest x-ray done which was normal.  There was concern about possible atypical pneumonia so he was given antibiotics.  He denies any shortness of breath but still has pain in both areas on the right and left.  The left is considerably more uncomfortable/painful but he would like to have the right evaluated as well.  He reports having a fall about 4 to 5 months ago but he does not remember exactly how he fell.       Past Medical History  Allergies; Arthritis; Colon cancer screening; Headache, cluster; Hernia; High blood pressure; High cholesterol; Migraine; Need for hepatitis C screening test; Need for shingles vaccine; Prostate cancer screening; Renal calculus or stone; Right 1st CMC primary osteoarthritis; Right de Quervain's         Past Surgical History  Ankle surgery; Colonoscopy; EYE SURGERY; Inguinal Hernia Repair; Joint Surgery; Knee replacement, left; Shoulder surgery; Tonsillectomy         Medication List  Aimovig Autoinjector 70 mg/mL subcutaneous auto-injector; aspirin 81 mg oral tablet,delayed release (DR/EC); diclofenac sodium 75 mg oral tablet,delayed release (DR/EC); Fioricet -40 mg oral capsule;  "lidocaine 5 % topical adhesive patch,medicated; lidocaine 5 % topical cream; lisinopril-hydrochlorothiazide 20-25 mg oral tablet; Robaxin 500 mg oral tablet; simvastatin 10 mg oral tablet; tramadol 50 mg oral tablet; Voltaren 1 % topical gel         Allergy List  Tigan       Allergies Reconciled  Family Medical History  *Unremarkable; No family history of colorectal cancer         Social History  Alcohol (Never); Alcohol Use (Never); lives with spouse; ; .; Recreational Drug Use (Never); Retired [employer]; Retired.; Tobacco (Never); Working         Immunizations  Name Date Admin   Influenza 10/04/2019         Review of Systems     Gen: Denies any fever, chills, or weight changes  Respiratory: Denies any shortness of breath  Extremities: Denies edema  Psychiatric: Denies any changes in mood or affect  Neurologic: Denies any deficits  Skin: Denies any rashes       Vitals  Date Time BP Position Site L\R Cuff Size HR RR TEMP (F) WT  HT  BMI kg/m2 BSA m2 O2 Sat FR L/min FiO2 HC       02/01/2021 02:56 /74 Sitting    59 - R  98.1 191lbs 2oz 5'  6.5\" 30.39 2.02 97 %            Physical Examination     General: AAO 3, no acute distress, pleasant  HEENT: Normocephalic, atraumatic  Cardiovascular: Regular rate and rhythm without appreciable murmur  Respiratory: Clear to auscultation bilaterally no RRW  Musculoskeletal: No displaced ribs palpated.  Skin: Patient has near the 10th rib along the posterior axillary line on the left to subcutaneous mobile/rubbery lesions consistent with lipomas that are reproducibly tender to palpation.  Each measures about a centimeter in diameter and are adjacent to each other.  Patient confirmed that this is the source of the pain.  On the right side near the mid axillary line in the 10th rib he has a similar reproducible tenderness.  In this area although less noticeable there is a linear about 1 cm lesion that is also reproducibly tender.  A lipoma is considered as " well.  extremities: No edema  Neurologic: CN II through XII grossly intact   Psychiatric: Normal mood and affect           Assessment  · Screening for depression     V79.0/Z13.89  · Lipoma     214.9/D17.9  Given discomfort/pain I discussed with him referral to general surgery to have his lipomas removed. He has definite subcutaneous lesions on the left that are mobile/rubbery and reproducible tenderness is noted and appears consistent with lipomas. On the right side he has a lesion that is a lot smaller and linear and a lipoma would be considered. Again, discussed referral to general surgery to have lesions removed.      Plan  · Orders  o ACO-18: Negative screen for clinical depression using a standardized tool () - V79.0/Z13.89 - 02/01/2021   score 4  o ACO-39: Current medications updated and reviewed (1159F, ) - - 02/01/2021  o GENERAL SURGERY (GNSUR) - 214.9/D17.9 - 02/01/2021   please refer to Dr. Stephen Garcia for removal of subcutaneous mass/lipoma  · Medications  o diclofenac sodium 75 mg oral tablet,delayed release (DR/EC)   SIG: take 1 tablet (75 mg) by oral route 2 times per day as needed   DISP: (60) Tablet with 1 refills  Prescribed on 02/01/2021     o lidocaine 5 % topical cream   SIG: apply to affected area(s) by topical route TID PRN   DISP: (30) Gram with 3 refills  Adjusted on 02/01/2021     o prednisone 20 mg oral tablet   SIG: take 2 tablets PO daily x 5 days, then take 1 tablet PO daily x 5 days, then take 1/2 tablet PO daily x 5 days   DISP: (18) Tablet with 0 refills  Discontinued on 02/01/2021     o Medications have been Reconciled  o Transition of Care or Provider Policy  · Instructions  o Depression Screen completed and scanned into the EMR under the designated folder within the patient's documents.  o Today's PHQ-9 result is _4__  o Patient was educated/instructed on their diagnosis, treatment and medications prior to discharge from the clinic today.  o Patient instructed to seek  medical attention urgently for new or worsening symptoms.  o Call the office with any concerns or questions.  · Disposition  o Follow Up in 3 months.            Electronically Signed by: Christofer Lagos DO - on February 1, 2021 05:05:00 PM

## 2021-05-14 NOTE — PROGRESS NOTES
Progress Note      Patient Name: Leandro Salcedo   Patient ID: [id number]   Sex: Male   YOB: 1960    Primary Care Provider: Christofer Lagos DO   Referring Provider: Christofer Lagos DO    Visit Date: March 8, 2021    Provider: Stephen Garcia MD   Location: [facility]   Location Address: [address] [place] [address]  [id number]   Location Phone: [phone]          Chief Complaint  · Follow up Surgery      History Of Present Illness  Leandro Salcedo is a 61 year old /White male who presents today for a postoperative visit.      Patient is here for follow-up after I removed 2 lipomas from his abdominal wall.  No complaints.  Incisions are healing fine.  Pathology results were discussed.  No new issues to address.  Patient will see me as needed.       Past Medical History  Disease Name Date Onset Notes   Allergies --  --    Arthritis --  --    Colon cancer screening --  --    Headache, cluster --  --    Hernia --  --    High blood pressure --  --    High cholesterol --  --    Migraine --  --    Need for hepatitis C screening test 2017 neg per patient   Need for shingles vaccine done --    Prostate cancer screening --  6/12/2019-PSA 0.43   Renal calculus or stone --  --    Right 1st CMC primary osteoarthritis 07/25/2017 --    Right de Quervain's 07/25/2017 --          Past Surgical History  Procedure Name Date Notes   Ankle surgery --  --    Colonoscopy 2018 --    EYE SURGERY --  --    Inguinal Hernia Repair --  right 04/07/2015   Joint Surgery --  --    Knee replacement, left --  --    Shoulder surgery --  --    Tonsillectomy --  --          Medication List  Name Date Started Instructions   Aimovig Autoinjector 70 mg/mL subcutaneous auto-injector 06/12/2019 inject 1 milliliter (70 mg) by subcutaneous route once a month in the abdomen, thigh, or outer area of upper arm for 30 days   aspirin 81 mg oral tablet,delayed release (DR/EC)  take 1 tablet (81 mg) by oral route  once daily   diclofenac sodium 75 mg oral tablet,delayed release (DR/EC) 02/01/2021 take 1 tablet (75 mg) by oral route 2 times per day as needed   Fioricet -40 mg oral capsule  take 1 capsule by oral route every 8 hours as needed   lidocaine 5 % topical adhesive patch,medicated  apply 1 patch by transdermal route once daily (May wear up to 12hours.)   lidocaine 5 % topical cream 02/01/2021 apply to affected area(s) by topical route TID PRN   lisinopril-hydrochlorothiazide 20-25 mg oral tablet  take 1 tablet by oral route once daily   Robaxin 500 mg oral tablet  take 2 tablets by oral route once a day (at bedtime)   simvastatin 10 mg oral tablet  take 1 tablet (10 mg) by oral route once daily in the evening   tramadol 50 mg oral tablet  take 1 tablet by oral route once a day (at bedtime) as needed   Voltaren 1 % topical gel 11/16/2020 apply 2 grams to the affected area(s) by topical route 4 times per day for 30 days         Allergy List  Allergen Name Date Reaction Notes   Tigan --  quit breathing --          Family Medical History  Disease Name Relative/Age Notes   *Unremarkable  --    No family history of colorectal cancer  --          Social History  Finding Status Start/Stop Quantity Notes   Alcohol Never --/-- --  --    Alcohol Use Never --/-- --  does not drink   lives with spouse --  --/-- --  --     --  --/-- --  3 children   . --  --/-- --  --    Recreational Drug Use Never --/-- --  no   Retired [employer] --  --/-- --  --    Retired. --  --/-- --  --    Tobacco Never --/-- --  never smoker   Working --  --/-- --  --          Immunizations  NameDate Admin Mfg Trade Name Lot Number Route Inj VIS Given VIS Publication   Oelzcnemu24/04/2019 [facility] Fluzone Quadrivalent [id number] IM LD 10/04/2019    Comments: TOLERATED WELL STABLE         Review of Systems  · Constitutional  o Denies  o : fever, chills  · Cardiovascular  o Denies  o : chest pain on exertion  · Respiratory  o Denies  o : shortness of  "breath, cough  · Gastrointestinal  o Denies  o : nausea, vomiting      Vitals  Date Time BP Position Site L\R Cuff Size HR RR TEMP (F) WT  HT  BMI kg/m2 BSA m2 O2 Sat FR L/min FiO2 HC       03/08/2021 01:10 PM       14  184lbs 16oz 5'  6.5\" 29.41 1.98                 Assessment  · Postoperative Exam Following Surgery     V67.00      Plan  · Instructions  o See Above HPI section.            Electronically Signed by: Stephen Garcia MD -Author on March 8, 2021 01:39:02 PM  "

## 2021-05-15 VITALS
BODY MASS INDEX: 29.29 KG/M2 | HEIGHT: 66 IN | OXYGEN SATURATION: 97 % | TEMPERATURE: 97.6 F | DIASTOLIC BLOOD PRESSURE: 90 MMHG | WEIGHT: 182.25 LBS | HEART RATE: 62 BPM | SYSTOLIC BLOOD PRESSURE: 134 MMHG

## 2021-05-15 VITALS — WEIGHT: 177 LBS | OXYGEN SATURATION: 97 % | BODY MASS INDEX: 28.45 KG/M2 | HEART RATE: 72 BPM | HEIGHT: 66 IN

## 2021-05-15 VITALS — OXYGEN SATURATION: 98 % | HEIGHT: 66 IN | HEART RATE: 60 BPM | WEIGHT: 175 LBS | BODY MASS INDEX: 28.12 KG/M2

## 2021-05-15 VITALS — BODY MASS INDEX: 29.73 KG/M2 | WEIGHT: 185 LBS | HEART RATE: 67 BPM | HEIGHT: 66 IN | OXYGEN SATURATION: 96 %

## 2021-05-16 VITALS — HEART RATE: 58 BPM | HEIGHT: 66 IN | BODY MASS INDEX: 31.5 KG/M2 | WEIGHT: 196 LBS | OXYGEN SATURATION: 98 %

## 2021-05-16 VITALS — HEART RATE: 74 BPM | HEIGHT: 66 IN | OXYGEN SATURATION: 98 % | BODY MASS INDEX: 31.5 KG/M2 | WEIGHT: 196 LBS

## 2021-05-16 VITALS — OXYGEN SATURATION: 94 % | HEIGHT: 66 IN | BODY MASS INDEX: 31.5 KG/M2 | WEIGHT: 196 LBS | HEART RATE: 63 BPM

## 2021-05-16 VITALS — HEIGHT: 66 IN | HEART RATE: 77 BPM | BODY MASS INDEX: 31.53 KG/M2 | OXYGEN SATURATION: 98 % | WEIGHT: 196.19 LBS

## 2021-05-16 VITALS — BODY MASS INDEX: 31.55 KG/M2 | WEIGHT: 196.31 LBS | HEIGHT: 66 IN | RESPIRATION RATE: 16 BRPM

## 2021-05-16 VITALS — WEIGHT: 190 LBS | HEART RATE: 78 BPM | HEIGHT: 66 IN | BODY MASS INDEX: 30.53 KG/M2 | OXYGEN SATURATION: 97 %

## 2021-07-13 ENCOUNTER — OFFICE VISIT (OUTPATIENT)
Dept: ORTHOPEDIC SURGERY | Facility: CLINIC | Age: 61
End: 2021-07-13

## 2021-07-13 VITALS — WEIGHT: 185 LBS | BODY MASS INDEX: 29.73 KG/M2 | HEIGHT: 66 IN

## 2021-07-13 DIAGNOSIS — M65.4 DE QUERVAIN'S TENOSYNOVITIS, RIGHT: Primary | ICD-10-CM

## 2021-07-13 PROCEDURE — 20551 NJX 1 TENDON ORIGIN/INSJ: CPT | Performed by: ORTHOPAEDIC SURGERY

## 2021-07-13 PROCEDURE — 99213 OFFICE O/P EST LOW 20 MIN: CPT | Performed by: ORTHOPAEDIC SURGERY

## 2021-07-13 RX ORDER — CELECOXIB 200 MG/1
CAPSULE ORAL
COMMUNITY
Start: 2021-05-19 | End: 2022-03-14

## 2021-07-13 RX ORDER — BUTALBITAL, ACETAMINOPHEN AND CAFFEINE 300; 40; 50 MG/1; MG/1; MG/1
CAPSULE ORAL
COMMUNITY
End: 2022-03-16

## 2021-07-13 RX ORDER — PROPRANOLOL HYDROCHLORIDE 160 MG/1
CAPSULE, EXTENDED RELEASE ORAL
COMMUNITY
Start: 2021-05-19

## 2021-07-13 RX ORDER — LISINOPRIL 40 MG/1
TABLET ORAL
COMMUNITY
Start: 2021-05-19

## 2021-07-13 RX ORDER — TRAMADOL HYDROCHLORIDE 50 MG/1
TABLET ORAL
COMMUNITY
Start: 2021-06-25

## 2021-07-13 RX ORDER — ASPIRIN 81 MG/1
TABLET, CHEWABLE ORAL
COMMUNITY
Start: 2021-05-19 | End: 2022-05-31

## 2021-07-13 RX ORDER — SIMVASTATIN 20 MG
TABLET ORAL
COMMUNITY
Start: 2021-05-19 | End: 2021-07-29

## 2021-07-13 RX ADMIN — METHYLPREDNISOLONE ACETATE 80 MG: 80 INJECTION, SUSPENSION INTRA-ARTICULAR; INTRALESIONAL; INTRAMUSCULAR; SOFT TISSUE at 15:22

## 2021-07-13 RX ADMIN — LIDOCAINE HYDROCHLORIDE 1 ML: 10 INJECTION, SOLUTION INFILTRATION; PERINEURAL at 15:22

## 2021-07-13 NOTE — PROGRESS NOTES
"Chief Complaint  Follow-up of the Right Wrist     Subjective      Leandro Salcedo presents to [facility] for a follow-up of right wrist. Patient complains of pain that started at the base of his thumb and radiates up to his thumb and down his wrist. Patient states that his right wrist is sore. He denies any injury or trauma to his right wrist. He did an EMG at [place] revealing carpal tunnel and tendinitis. Patient states that he doesn't have much numbness and tingling at this time.     Allergies   Allergen Reactions   • Trimethobenzamide Hcl Shortness Of Breath        Social History     Socioeconomic History   • Marital status:      Spouse name: Not on file   • Number of children: Not on file   • Years of education: Not on file   • Highest education level: Not on file   Tobacco Use   • Smoking status: Never Smoker   Vaping Use   • Vaping Use: Never used   Substance and Sexual Activity   • Alcohol use: Never   • Drug use: Never        Review of Systems     Objective   Vital Signs:   Ht 167.6 cm (66\")   Wt 83.9 kg (185 lb)   BMI 29.86 kg/m²       Physical Exam  Constitutional:       Appearance: Normal appearance. He is well-developed and normal weight.   HENT:      Head: Normocephalic.      Right Ear: Hearing and external ear normal.      Left Ear: Hearing and external ear normal.      Nose: Nose normal.   Eyes:      Conjunctiva/sclera: Conjunctivae normal.   Cardiovascular:      Rate and Rhythm: Normal rate.   Pulmonary:      Effort: Pulmonary effort is normal.      Breath sounds: No wheezing or rales.   Abdominal:      Palpations: Abdomen is soft.      Tenderness: There is no abdominal tenderness.   Musculoskeletal:      Cervical back: Normal range of motion.   Skin:     Findings: No rash.   Neurological:      Mental Status: He is alert and oriented to person, place, and time.   Psychiatric:         Mood and Affect: Mood and affect normal.         Judgment: Judgment normal. "       Ortho Exam      RIGHT WRIST: Positive finkelstein's. Neurovascular intact. Sensation grossly intact. No swelling, atrophy, and skin discoloration. Skin intact. Full ROM. Patient able to wiggle fingers and make a fist. Full wrist extension, full wrist flexion, full , full thumb opposition, full PIP flexors, full DIP flexors, full PIP extensors, full finger adduction, full finger abduction. Radial pulse 2+, ulnar pulse 2+.       Small Joint Arthrocentesis  Consent given by: patient  Site marked: site marked  Timeout: Immediately prior to procedure a time out was called to verify the correct patient, procedure, equipment, support staff and site/side marked as required   Supporting Documentation  Indications: pain   Procedure Details  Location: [facility].  Preparation: Patient was prepped and draped in the usual sterile fashion  Needle gauge: 23G.  Medications administered: 80 mg methylPREDNISolone acetate 80 MG/ML; 1 mL lidocaine 1 %  Patient tolerance: patient tolerated the procedure well with no immediate complications            Imaging Results (Most Recent)     None           Result Review :       No results found.          Assessment and Plan     DX: Right de quervain's tenosynovitis    Discussed treatment plans and diagnosis with the patient. Patient given a right de quervain's tenosynovitis steroid injection and tolerated this procedure well. Home exercises were given to the patient today.      Call or return if worsening symptoms.    Follow Up     PRN.       Patient was given instructions and counseling regarding his condition or for health maintenance advice. Please see specific information pulled into the AVS if appropriate.     Scribed for Farideh Rucker MD by Meeta Quintana.  07/13/21   15:12 EDT    I have personally performed the services described in this document as scribed by the above individual and it is both accurate and complete.  Farideh Rucker MD 07/13/21  15:12 EDT

## 2021-07-14 RX ORDER — METHYLPREDNISOLONE ACETATE 80 MG/ML
80 INJECTION, SUSPENSION INTRA-ARTICULAR; INTRALESIONAL; INTRAMUSCULAR; SOFT TISSUE
Status: COMPLETED | OUTPATIENT
Start: 2021-07-13 | End: 2021-07-13

## 2021-07-14 RX ORDER — LIDOCAINE HYDROCHLORIDE 10 MG/ML
1 INJECTION, SOLUTION INFILTRATION; PERINEURAL
Status: COMPLETED | OUTPATIENT
Start: 2021-07-13 | End: 2021-07-13

## 2021-07-30 ENCOUNTER — TELEPHONE (OUTPATIENT)
Dept: ORTHOPEDIC SURGERY | Facility: CLINIC | Age: 61
End: 2021-07-30

## 2021-08-31 ENCOUNTER — OFFICE VISIT (OUTPATIENT)
Dept: ORTHOPEDIC SURGERY | Facility: CLINIC | Age: 61
End: 2021-08-31

## 2021-08-31 VITALS — HEIGHT: 66 IN | OXYGEN SATURATION: 97 % | HEART RATE: 59 BPM | WEIGHT: 187 LBS | BODY MASS INDEX: 30.05 KG/M2

## 2021-08-31 DIAGNOSIS — G56.01 CARPAL TUNNEL SYNDROME OF RIGHT WRIST: ICD-10-CM

## 2021-08-31 DIAGNOSIS — M65.4 DE QUERVAIN'S TENOSYNOVITIS, RIGHT: Primary | ICD-10-CM

## 2021-08-31 PROCEDURE — 99213 OFFICE O/P EST LOW 20 MIN: CPT | Performed by: PHYSICIAN ASSISTANT

## 2021-08-31 RX ORDER — DICLOFENAC SODIUM 75 MG/1
75 TABLET, DELAYED RELEASE ORAL 2 TIMES DAILY
Qty: 60 TABLET | Refills: 1 | Status: SHIPPED | OUTPATIENT
Start: 2021-08-31 | End: 2022-03-14

## 2021-08-31 NOTE — PROGRESS NOTES
"Chief Complaint  Pain of the Right Wrist and Pain of the Right Shoulder    Subjective          Leandro Salcedo presents to [facility] for follow up of right wrist pain. Patient was last seen in clinic on 07/13/21 by Dr. Rucker. He was following up on EMG results showing carpal tunnel and Dequervian Tenosynovitis. Patient was given injection at this time and states no relief of his pain.  Patient works at [employer].  He states he knows most of his pain with typing at work.  Patient has tried a brace without relief.    Objective   Vital Signs:   Pulse 59   Ht 167.6 cm (66\")   Wt 84.8 kg (187 lb)   SpO2 97%   BMI 30.18 kg/m²       Physical Exam  Constitutional:       Appearance: Normal appearance. Patient is well-developed and normal weight.   HENT:      Head: Normocephalic.      Right Ear: Hearing and external ear normal.      Left Ear: Hearing and external ear normal.      Nose: Nose normal.   Eyes:      Conjunctiva/sclera: Conjunctivae normal.   Cardiovascular:      Rate and Rhythm: Normal rate.   Pulmonary:      Effort: Pulmonary effort is normal.      Breath sounds: No wheezing or rales.   Abdominal:      Palpations: Abdomen is soft.      Tenderness: There is no abdominal tenderness.   Musculoskeletal:      Cervical back: Normal range of motion.   Skin:     Findings: No rash.   Neurological:      Mental Status: Patient is alert and oriented to person, place, and time.   Psychiatric:         Mood and Affect: Mood and affect normal.         Judgment: Judgment normal.     Ortho Exam  Right wrist: Sensation grossly intact.  Neurovascular intact.  Radial and ulnar pulse are 2+.  Full AROM with wrist flexion extension.  Positive Finkelstein test.  Positive Tinel's.  No thenar atrophy.  No tenderness, swelling, discoloration or deformity.  Patient is able to make a fist with good  strength.  Normal pronation and supination.  Normal radial and ulnar deviation.  Full active range of " motion of the digits.      Result Review :   The following data was reviewed by: OG Smith on 08/31/2021:         Imaging Results (Most Recent)     None                Assessment and Plan    Problem List Items Addressed This Visit        Musculoskeletal and Injuries    De Quervain's tenosynovitis, right - Primary       Neuro    Carpal tunnel syndrome of right wrist          Follow Up   Return in about 1 month (around 9/30/2021).  Patient Instructions   Recommend use of brace during rest.   Anti-inflammatory prescribed today for symptoms.   Patient would like to discuss surgery with Dr. Rucker after talking it over with his job. Follow up in 6-8 weeks to discuss.    Patient was given instructions and counseling regarding his condition or for health maintenance advice. Please see specific information pulled into the AVS if appropriate.

## 2021-08-31 NOTE — PATIENT INSTRUCTIONS
Recommend use of brace during rest.   Anti-inflammatory prescribed today for symptoms.   Patient would like to discuss surgery with Dr. Rucker after talking it over with his job. Follow up in 6-8 weeks to discuss.

## 2021-11-01 ENCOUNTER — TELEPHONE (OUTPATIENT)
Dept: FAMILY MEDICINE CLINIC | Facility: CLINIC | Age: 61
End: 2021-11-01

## 2021-11-01 DIAGNOSIS — R30.0 DYSURIA: Primary | ICD-10-CM

## 2021-11-01 NOTE — TELEPHONE ENCOUNTER
I left a detailed voicemail at patient's phone number [phone].  Patient not identified on phone number listed in phone call encounter.  If patient calls back please inform him that I will put in a urinalysis and urine culture order.  I cannot start him on antibiotics until his urine is assessed.  If he cannot get it done today then he can get it done for tomorrow's visit.  Thank you.

## 2021-11-01 NOTE — TELEPHONE ENCOUNTER
Caller: Leandro Salcedo    Relationship: Self    Best call back number: [phone]     What medication are you requesting: SOMETHING FOR AN UTI     What are your current symptoms: FREQUENT, PAINFUL URINATION    How long have you been experiencing symptoms: 2 DAYS    Have you had these symptoms before:    [x] Yes  [] No    Have you been treated for these symptoms before:   [x] Yes  [] No    If a prescription is needed, what is your preferred pharmacy and phone number: [address] [phone]  - [phone] FX     Additional notes:  PATIENT HAS APPOINTMENT SCHEDULED FOR 11/02; BUT REQUESTS SOMETHING CALLED IN BEFORE AND WOULD STILL COME TO APPOINTMENT.

## 2021-11-02 ENCOUNTER — OFFICE VISIT (OUTPATIENT)
Dept: FAMILY MEDICINE CLINIC | Facility: CLINIC | Age: 61
End: 2021-11-02

## 2021-11-02 VITALS
DIASTOLIC BLOOD PRESSURE: 78 MMHG | SYSTOLIC BLOOD PRESSURE: 128 MMHG | OXYGEN SATURATION: 98 % | WEIGHT: 186.4 LBS | BODY MASS INDEX: 29.26 KG/M2 | TEMPERATURE: 98 F | HEIGHT: 67 IN | HEART RATE: 60 BPM

## 2021-11-02 DIAGNOSIS — R30.0 DYSURIA: Primary | ICD-10-CM

## 2021-11-02 DIAGNOSIS — Z12.11 SCREENING FOR COLON CANCER: ICD-10-CM

## 2021-11-02 DIAGNOSIS — M62.838 MUSCLE SPASM: ICD-10-CM

## 2021-11-02 DIAGNOSIS — S16.1XXA STRAIN OF NECK MUSCLE, INITIAL ENCOUNTER: ICD-10-CM

## 2021-11-02 LAB
BILIRUB BLD-MCNC: NEGATIVE MG/DL
CLARITY, POC: CLEAR
COLOR UR: YELLOW
GLUCOSE UR STRIP-MCNC: NEGATIVE MG/DL
KETONES UR QL: NEGATIVE
LEUKOCYTE EST, POC: NEGATIVE
NITRITE UR-MCNC: NEGATIVE MG/ML
PH UR: 5.5 [PH] (ref 5–8)
PROT UR STRIP-MCNC: NEGATIVE MG/DL
RBC # UR STRIP: NEGATIVE /UL
SP GR UR: 1.01 (ref 1–1.03)
UROBILINOGEN UR QL: NORMAL

## 2021-11-02 PROCEDURE — 99213 OFFICE O/P EST LOW 20 MIN: CPT | Performed by: FAMILY MEDICINE

## 2021-11-02 PROCEDURE — 81002 URINALYSIS NONAUTO W/O SCOPE: CPT | Performed by: FAMILY MEDICINE

## 2021-11-02 PROCEDURE — 87086 URINE CULTURE/COLONY COUNT: CPT | Performed by: FAMILY MEDICINE

## 2021-11-02 RX ORDER — LIDOCAINE 50 MG/G
PATCH TOPICAL
COMMUNITY
Start: 2021-08-10 | End: 2021-11-09 | Stop reason: SDUPTHER

## 2021-11-02 RX ORDER — HYDROCHLOROTHIAZIDE 25 MG/1
TABLET ORAL
COMMUNITY
Start: 2021-10-26

## 2021-11-02 RX ORDER — SIMVASTATIN 20 MG
TABLET ORAL
COMMUNITY
Start: 2021-10-26

## 2021-11-02 RX ORDER — LIDOCAINE 50 MG/G
OINTMENT TOPICAL
COMMUNITY
Start: 2021-10-26

## 2021-11-02 RX ORDER — TIZANIDINE 4 MG/1
4 TABLET ORAL NIGHTLY PRN
Qty: 30 TABLET | Refills: 0 | Status: SHIPPED | OUTPATIENT
Start: 2021-11-02 | End: 2021-11-09

## 2021-11-02 RX ORDER — GABAPENTIN 300 MG/1
300 CAPSULE ORAL 3 TIMES DAILY
COMMUNITY
Start: 2021-10-26

## 2021-11-02 NOTE — TELEPHONE ENCOUNTER
Patient never returned phone call back , patient has an appointment today , will do UA once arrived.

## 2021-11-02 NOTE — PROGRESS NOTES
"Chief Complaint  Difficulty Urinating and Neck Pain    Subjective          Leandro Salcedo presents to [facility]  History of Present Illness  Patient presents today for an acute visit.  He has 2 separate issues to address.  He describes painful urination that has been going on since this weekend.  It started on Sunday.  He reports he drank several Coca-Cola's on Saturday as he was at a get together.  He states that he will get burning with urination when this happens.  He reports that previously some years ago he was diagnosed with urinary tract infection after this occurred.  He did have a urine dip done today which was relatively unremarkable besides for slightly elevated specific gravity at 1.015.  Urine culture will be sent off for further evaluation.  I suspect he is having a irritation of the urethra causing his symptoms.    Patient also describes neck pain for the past 7 days.  Reports that he woke up and had neck pain.  He states he may have slept wrong.  It hurts bilaterally on the posterior aspect of the neck/cervical spine area.  Patient also reports that he needs a colonoscopy.  Reports his last colonoscopy was 2 years ago.  It was done on [facility].  He states that he needs 1 done every 2 years.  He states that he normally had it done every 2 years.  I discussed setting him up for colonoscopy.  However his colonoscopy comes back normal we will go with further recommendations per GI such as having a repeat in 5 or 10 years depending on clinical course.    Objective   Vital Signs:   /78   Pulse 60   Temp 98 °F (36.7 °C)   Ht 168.9 cm (66.5\")   Wt 84.6 kg (186 lb 6.4 oz)   SpO2 98%   BMI 29.64 kg/m²     Physical Exam   General: AAO ×3, no acute distress, pleasant  HEENT: Normocephalic, atraumatic  Musculoskeletal: Cervical spine hypertonicity and tenderness to palpation.  Patient has a difficult time extending his neck completely.  Cardiovascular: Regular rate and " rhythm without appreciable murmur  Respiratory: Clear to auscultation bilaterally no RRW  Neurologic: CN II through XII grossly intact   Psychiatric: Normal mood and affect  Result Review :                 Assessment and Plan    Diagnoses and all orders for this visit:    1. Dysuria (Primary)  -     POCT urinalysis dipstick, manual  -     Urine Culture - Urine, Urine, Clean Catch    2. Muscle spasm    3. Screening for colon cancer  -     Ambulatory Referral For Screening Colonoscopy    4. Strain of neck muscle, initial encounter    Other orders  -     tiZANidine (ZANAFLEX) 4 MG tablet; Take 1 tablet by mouth At Night As Needed for Muscle Spasms.  Dispense: 30 tablet; Refill: 0    Patient has a muscle strain of his neck.  I suspect this will be a self-limited process.  I will give him tizanidine.  Discussed having him avoid operating heavy machinery or equipment while taking tizanidine due to drowsiness side effect.  Referral will be made for colonoscopy.  I suspect the painful urination that he is experiencing is due to urethral irritation.  I do not suspect urinary tract infection at this time.  Urine culture has been ordered to further evaluate.  I will see patient back on an as-needed basis as [facility] and comes here for acute visits and on an as-needed basis.    Follow Up   Return if symptoms worsen or fail to improve.  Patient was given instructions and counseling regarding his condition or for health maintenance advice. Please see specific information pulled into the AVS if appropriate.

## 2021-11-03 LAB — BACTERIA SPEC AEROBE CULT: NO GROWTH

## 2021-11-09 ENCOUNTER — APPOINTMENT (OUTPATIENT)
Dept: GENERAL RADIOLOGY | Facility: HOSPITAL | Age: 61
End: 2021-11-09

## 2021-11-09 ENCOUNTER — HOSPITAL ENCOUNTER (EMERGENCY)
Facility: HOSPITAL | Age: 61
Discharge: HOME | End: 2021-11-09
Attending: EMERGENCY MEDICINE | Admitting: EMERGENCY MEDICINE

## 2021-11-09 ENCOUNTER — OFFICE VISIT (OUTPATIENT)
Dept: FAMILY MEDICINE CLINIC | Facility: CLINIC | Age: 61
End: 2021-11-09

## 2021-11-09 VITALS
BODY MASS INDEX: 30.07 KG/M2 | HEART RATE: 58 BPM | OXYGEN SATURATION: 98 % | SYSTOLIC BLOOD PRESSURE: 122 MMHG | WEIGHT: 191.6 LBS | DIASTOLIC BLOOD PRESSURE: 78 MMHG | TEMPERATURE: 98.7 F | HEIGHT: 67 IN

## 2021-11-09 VITALS
RESPIRATION RATE: 18 BRPM | WEIGHT: 187.83 LBS | HEART RATE: 53 BPM | BODY MASS INDEX: 30.19 KG/M2 | HEIGHT: 66 IN | SYSTOLIC BLOOD PRESSURE: 131 MMHG | DIASTOLIC BLOOD PRESSURE: 98 MMHG | TEMPERATURE: 97.6 F | OXYGEN SATURATION: 96 %

## 2021-11-09 DIAGNOSIS — R07.9 CHEST PAIN, UNSPECIFIED TYPE: Primary | ICD-10-CM

## 2021-11-09 DIAGNOSIS — R42 DIZZINESS: ICD-10-CM

## 2021-11-09 LAB
ALBUMIN SERPL-MCNC: 4.4 G/DL (ref 3.5–5.2)
ALBUMIN/GLOB SERPL: 1.8 G/DL
ALP SERPL-CCNC: 75 U/L (ref 39–117)
ALT SERPL W P-5'-P-CCNC: 16 U/L (ref 1–41)
ANION GAP SERPL CALCULATED.3IONS-SCNC: 10.1 MMOL/L (ref 5–15)
AST SERPL-CCNC: 18 U/L (ref 1–40)
BASOPHILS # BLD AUTO: 0.03 10*3/MM3 (ref 0–0.2)
BASOPHILS NFR BLD AUTO: 0.4 % (ref 0–1.5)
BILIRUB SERPL-MCNC: 0.2 MG/DL (ref 0–1.2)
BUN SERPL-MCNC: 20 MG/DL (ref 6–20)
BUN/CREAT SERPL: 21.7 (ref 7–25)
CALCIUM SPEC-SCNC: 9.7 MG/DL (ref 8.6–10.5)
CHLORIDE SERPL-SCNC: 105 MMOL/L (ref 98–107)
CK MB SERPL-CCNC: 1.6 NG/ML
CK SERPL-CCNC: 72 U/L (ref 20–200)
CO2 SERPL-SCNC: 26.9 MMOL/L (ref 22–29)
CREAT SERPL-MCNC: 0.92 MG/DL (ref 0.76–1.27)
DEPRECATED RDW RBC AUTO: 39.3 FL (ref 37–54)
EOSINOPHIL # BLD AUTO: 0 10*3/MM3 (ref 0–0.4)
EOSINOPHIL NFR BLD AUTO: 0 % (ref 0.3–6.2)
ERYTHROCYTE [DISTWIDTH] IN BLOOD BY AUTOMATED COUNT: 11.9 % (ref 12.3–15.4)
GFR SERPL CREATININE-BSD FRML MDRD: 84 ML/MIN/1.73
GLOBULIN UR ELPH-MCNC: 2.5 GM/DL
GLUCOSE SERPL-MCNC: 101 MG/DL (ref 65–99)
HCT VFR BLD AUTO: 43.5 % (ref 37.5–51)
HGB BLD-MCNC: 14.9 G/DL (ref 13–17.7)
HOLD SPECIMEN: NORMAL
IMM GRANULOCYTES # BLD AUTO: 0.01 10*3/MM3 (ref 0–0.05)
IMM GRANULOCYTES NFR BLD AUTO: 0.1 % (ref 0–0.5)
LIPASE SERPL-CCNC: 27 U/L (ref 13–60)
LYMPHOCYTES # BLD AUTO: 3.81 10*3/MM3 (ref 0.7–3.1)
LYMPHOCYTES NFR BLD AUTO: 45.1 % (ref 19.6–45.3)
MAGNESIUM SERPL-MCNC: 2 MG/DL (ref 1.6–2.6)
MCH RBC QN AUTO: 30.8 PG (ref 26.6–33)
MCHC RBC AUTO-ENTMCNC: 34.3 G/DL (ref 31.5–35.7)
MCV RBC AUTO: 90.1 FL (ref 79–97)
MONOCYTES # BLD AUTO: 0.53 10*3/MM3 (ref 0.1–0.9)
MONOCYTES NFR BLD AUTO: 6.3 % (ref 5–12)
NEUTROPHILS NFR BLD AUTO: 4.06 10*3/MM3 (ref 1.7–7)
NEUTROPHILS NFR BLD AUTO: 48.1 % (ref 42.7–76)
NRBC BLD AUTO-RTO: 0 /100 WBC (ref 0–0.2)
NT-PROBNP SERPL-MCNC: 74.9 PG/ML (ref 0–900)
PLATELET # BLD AUTO: 209 10*3/MM3 (ref 140–450)
PMV BLD AUTO: 10.1 FL (ref 6–12)
POTASSIUM SERPL-SCNC: 3.9 MMOL/L (ref 3.5–5.2)
PROT SERPL-MCNC: 6.9 G/DL (ref 6–8.5)
QT INTERVAL: 496 MS
QT INTERVAL: 497 MS
RBC # BLD AUTO: 4.83 10*6/MM3 (ref 4.14–5.8)
SODIUM SERPL-SCNC: 142 MMOL/L (ref 136–145)
TROPONIN I SERPL-MCNC: 0 NG/ML (ref 0–0.6)
WBC # BLD AUTO: 8.44 10*3/MM3 (ref 3.4–10.8)
WHOLE BLOOD HOLD SPECIMEN: NORMAL
WHOLE BLOOD HOLD SPECIMEN: NORMAL

## 2021-11-09 PROCEDURE — 82550 ASSAY OF CK (CPK): CPT

## 2021-11-09 PROCEDURE — 82553 CREATINE MB FRACTION: CPT

## 2021-11-09 PROCEDURE — 83690 ASSAY OF LIPASE: CPT

## 2021-11-09 PROCEDURE — 93005 ELECTROCARDIOGRAM TRACING: CPT

## 2021-11-09 PROCEDURE — 93000 ELECTROCARDIOGRAM COMPLETE: CPT | Performed by: NURSE PRACTITIONER

## 2021-11-09 PROCEDURE — 25010000002 KETOROLAC TROMETHAMINE PER 15 MG: Performed by: EMERGENCY MEDICINE

## 2021-11-09 PROCEDURE — 96374 THER/PROPH/DIAG INJ IV PUSH: CPT

## 2021-11-09 PROCEDURE — 83735 ASSAY OF MAGNESIUM: CPT

## 2021-11-09 PROCEDURE — 99283 EMERGENCY DEPT VISIT LOW MDM: CPT

## 2021-11-09 PROCEDURE — 83880 ASSAY OF NATRIURETIC PEPTIDE: CPT

## 2021-11-09 PROCEDURE — 84484 ASSAY OF TROPONIN QUANT: CPT

## 2021-11-09 PROCEDURE — 99214 OFFICE O/P EST MOD 30 MIN: CPT | Performed by: NURSE PRACTITIONER

## 2021-11-09 PROCEDURE — 80053 COMPREHEN METABOLIC PANEL: CPT

## 2021-11-09 PROCEDURE — 85025 COMPLETE CBC W/AUTO DIFF WBC: CPT

## 2021-11-09 PROCEDURE — 93005 ELECTROCARDIOGRAM TRACING: CPT | Performed by: EMERGENCY MEDICINE

## 2021-11-09 PROCEDURE — 93010 ELECTROCARDIOGRAM REPORT: CPT | Performed by: INTERNAL MEDICINE

## 2021-11-09 PROCEDURE — 71045 X-RAY EXAM CHEST 1 VIEW: CPT

## 2021-11-09 RX ORDER — KETOROLAC TROMETHAMINE 30 MG/ML
30 INJECTION, SOLUTION INTRAMUSCULAR; INTRAVENOUS ONCE
Status: COMPLETED | OUTPATIENT
Start: 2021-11-09 | End: 2021-11-09

## 2021-11-09 RX ORDER — SODIUM CHLORIDE 0.9 % (FLUSH) 0.9 %
10 SYRINGE (ML) INJECTION AS NEEDED
Status: DISCONTINUED | OUTPATIENT
Start: 2021-11-09 | End: 2021-11-09 | Stop reason: HOSPADM

## 2021-11-09 RX ORDER — ASPIRIN 81 MG/1
324 TABLET, CHEWABLE ORAL ONCE
Status: COMPLETED | OUTPATIENT
Start: 2021-11-09 | End: 2021-11-09

## 2021-11-09 RX ADMIN — ASPIRIN 324 MG: 81 TABLET, CHEWABLE ORAL at 16:46

## 2021-11-09 RX ADMIN — KETOROLAC TROMETHAMINE 30 MG: 30 INJECTION, SOLUTION INTRAMUSCULAR; INTRAVENOUS at 18:31

## 2021-11-09 NOTE — DISCHARGE INSTRUCTIONS
Continue current medications.  Return if symptoms return or persist.  Return for worsening symptoms.  Follow-up with your doctor- call tomorrow for an appointment.

## 2021-11-09 NOTE — PROGRESS NOTES
"Chief Complaint  Dizziness, Chest Pain (both sides of upper chest), and Vomiting    Subjective          Leandro Salcedo presents to [facility]  History of Present Illness  He is here today for an acute visit, he is a patient of Dr. Lagos.  He is complaining of dizziness when he stands up for few days.  He denies any sinus drainage or ear pain.  He states he vomited on Sunday.  He states he does have migraine headaches and does have a headache currently.  He states he started having chest pain this morning and is still having some chest pain at the left side of the chest.  EKG was obtained while in the office today and he is at sinus bradycardia at 49 bpm with borderline intraventricular conduction delay.  His blood pressure is stable at 122/78.  He is on propranolol 160 mg daily, lisinopril 40 mg daily and hydrochlorothiazide 25 mg daily.    He does have a history of migraines and takes propranolol for prevention.  [facility]  Objective   Vital Signs:   /78   Pulse 58   Temp 98.7 °F (37.1 °C)   Ht 168.9 cm (66.5\")   Wt 86.9 kg (191 lb 9.6 oz)   SpO2 98%   BMI 30.46 kg/m²     Physical Exam  Vitals reviewed.   Constitutional:       Appearance: Normal appearance. He is well-developed.   HENT:      Head: Normocephalic.      Right Ear: Hearing, tympanic membrane, ear canal and external ear normal.      Left Ear: Hearing, tympanic membrane, ear canal and external ear normal.      Mouth/Throat:      Pharynx: No pharyngeal swelling, oropharyngeal exudate or posterior oropharyngeal erythema.   Neck:      Thyroid: No thyroid mass, thyromegaly or thyroid tenderness.   Cardiovascular:      Rate and Rhythm: Normal rate and regular rhythm.      Heart sounds: No murmur heard.  No friction rub. No gallop.    Pulmonary:      Effort: Pulmonary effort is normal.      Breath sounds: Normal breath sounds. No wheezing or rhonchi.   Lymphadenopathy:      " Cervical: No cervical adenopathy.   Skin:     General: Skin is warm and dry.   Neurological:      Mental Status: He is alert and oriented to person, place, and time.      Cranial Nerves: No cranial nerve deficit.   Psychiatric:         Mood and Affect: Mood and affect normal.         Behavior: Behavior normal.         Thought Content: Thought content normal. Thought content does not include homicidal or suicidal ideation.         Judgment: Judgment normal.        Result Review :            ECG 12 Lead    Date/Time: 11/9/2021 11:38 AM  Performed by: Kiki Rivera APRN  Authorized by: Kiki Rivera APRN   Comparison: not compared with previous ECG   Previous ECG: no previous ECG available  Rhythm: sinus bradycardia  Rate: bradycardic  Conduction: non-specific intraventricular conduction delay  ST Segments: ST segments normal  T Waves: T waves normal    Clinical impression: abnormal EKG  Comments: Sinus bradycardia at 49 bpm with borderline intraventricular conduction delay, QRSd greater than 112.              Assessment and Plan    Diagnoses and all orders for this visit:    1. Chest pain, unspecified type (Primary)  -     ECG 12 Lead    2. Dizziness    Abnormal EKG with bradycardia, chest pain and dizziness.  EKG was also evaluated and discussed with Dr. Clarke and Dr. Lagos.  Recommendation is for patient to go to the emergency room for further tests and evaluation.  Discussed with patient and patient is willing to go to the emergency room.  He states he feels stable that he can drive himself.  Advised that if he starts having worsening of pain, dizziness or difficulty breathing to pull over and call 911.    Follow Up   Return for Patient instructed to go to the ER for further evaluation.  Advised to follow-up afterwards..  Patient was given instructions and counseling regarding his condition or for health maintenance advice. Please see specific information pulled into the AVS if appropriate.

## 2021-11-09 NOTE — ED PROVIDER NOTES
Subjective   This patient presents to the emergency department complaining of chest pain.  The patient states that he had a brief episode of chest discomfort this morning which he describes as sharp and nonradiating.  The patient has had no nausea vomiting shortness of breath he has had intermittent lightheadedness particular when he stands up at times.  This lightheadedness has been going on for some time.  Patient has had no recent fever chills or cough he has had no vomiting or diarrhea.  She denies any bloody or black bowel movements.    The patient was initially seen at a urgent care and told he needed to come to the emergency room because his EKG was abnormal.  Patient currently states that he is symptom-free.          Review of Systems   Constitutional: Negative.    HENT: Negative.    Eyes: Negative.    Respiratory: Negative.    Cardiovascular: Positive for chest pain.   Gastrointestinal: Negative.    Endocrine: Negative.    Genitourinary: Negative.    Musculoskeletal: Negative.    Skin: Negative.    Allergic/Immunologic: Negative.    Neurological: Negative.    Hematological: Negative.    Psychiatric/Behavioral: Negative.        Past Medical History:   Diagnosis Date   • Allergies    • Arthritis    • Colon cancer (HCC)    • Condition not found     hernia   • De Quervain's fracture of right wrist 07/25/2017   • Headache, cluster    • High blood pressure    • High cholesterol    • Migraine    • Need for hepatitis C screening test 2017    neg per patient   • Need for shingles vaccine     done   • Primary osteoarthritis 07/25/2017    Right 1st [facility] primary osteoarthritis   • Prostate cancer screening 06/12/2019    PSA 0.43   • Renal calculus or stone        Allergies   Allergen Reactions   • Tigan [Trimethobenzamide] Anaphylaxis       Past Surgical History:   Procedure Laterality Date   • ANKLE SURGERY     • COLONOSCOPY  2018   • EYE SURGERY     • INGUINAL HERNIA REPAIR Right 04/07/2015   • JOINT REPLACEMENT     •  LIPOMA EXCISION     • SHOULDER SURGERY     • TONSILLECTOMY     • TOTAL KNEE ARTHROPLASTY Left        Family History   Family history unknown: Yes       Social History     Socioeconomic History   • Marital status:    Tobacco Use   • Smoking status: Never Smoker   • Smokeless tobacco: Never Used   Vaping Use   • Vaping Use: Never used   Substance and Sexual Activity   • Alcohol use: Never   • Drug use: Never           Objective   Physical Exam  Vitals and nursing note reviewed.   Constitutional:       Appearance: He is well-developed.   HENT:      Head: Normocephalic and atraumatic.   Eyes:      Extraocular Movements: Extraocular movements intact.      Pupils: Pupils are equal, round, and reactive to light.   Cardiovascular:      Rate and Rhythm: Normal rate and regular rhythm.      Heart sounds: Normal heart sounds.   Pulmonary:      Effort: Pulmonary effort is normal.      Breath sounds: Normal breath sounds.   Chest:      Chest wall: No mass, deformity, tenderness, crepitus or edema. There is no dullness to percussion.   Abdominal:      General: Bowel sounds are normal.      Palpations: Abdomen is soft.   Musculoskeletal:         General: Normal range of motion.      Cervical back: Normal range of motion and neck supple.   Skin:     General: Skin is warm and dry.      Capillary Refill: Capillary refill takes less than 2 seconds.   Neurological:      General: No focal deficit present.      Mental Status: He is alert and oriented to person, place, and time.   Psychiatric:         Mood and Affect: Mood normal.         Behavior: Behavior normal.         Procedures           ED Course    EKG: Sinus bradycardia with a rate of 54  Normal P wave and IA interval  Interventricular conduction delay with left axis deviation  Nonspecific ST changes  Normal QT QTC    Labs Reviewed   COMPREHENSIVE METABOLIC PANEL - Abnormal; Notable for the following components:       Result Value    Glucose 101 (*)     All other  components within normal limits    Narrative:     GFR Normal >60  Chronic Kidney Disease <60  Kidney Failure <15     CBC WITH AUTO DIFFERENTIAL - Abnormal; Notable for the following components:    RDW 11.9 (*)     Eosinophil % 0.0 (*)     Lymphocytes, Absolute 3.81 (*)     All other components within normal limits   LIPASE - Normal   BNP (IN-HOUSE) - Normal    Narrative:     Among patients with dyspnea, NT-proBNP is highly sensitive for the detection of acute congestive heart failure. In addition NT-proBNP of <300 pg/ml effectively rules out acute congestive heart failure with 99% negative predictive value.    Results may be falsely decreased if patient taking Biotin.     MAGNESIUM - Normal   CK TOTAL AND CKMB - Normal    Narrative:     CKMB results may be falsely decreased if patient taking Biotin.   POCT TROPONIN I - Normal   RAINBOW DRAW    Narrative:     The following orders were created for panel order [place] Draw.  Procedure                               Abnormality         Status                     ---------                               -----------         ------                     Green Top (Gel)[[id number]]                                  Final result               Lavender Top[[id number]]                                     Final result               Gold Top - SST[[id number]]                                   Final result               Light Blue Top[[id number]]                                   Final result                 Please view results for these tests on the individual orders.   TROPONIN (IN-HOUSE)   TROPONIN (IN-HOUSE)   TROPONIN (IN-HOUSE)   POCT TROPONIN I   POCT TROPONIN I   CBC AND DIFFERENTIAL    Narrative:     The following orders were created for panel order CBC & Differential.  Procedure                               Abnormality         Status                     ---------                               -----------         ------                     CBC Auto Differential[[id number]]         Abnormal            Final result                 Please view results for these tests on the individual orders.   GREEN TOP   LAVENDER TOP   GOLD TOP - SST   LIGHT BLUE TOP   HOLD ISTAT TROPONIN-I TUBE   POCT TROPONIN-I WITH HOLD TUBE    Narrative:     The following orders were created for panel order POC Troponin I with Hold Tube.  Procedure                               Abnormality         Status                     ---------                               -----------         ------                     POC Troponin I[[id number]]                                                              HOLD Troponin-I Tube[[id number]]                             Final result                 Please view results for these tests on the individual orders.   POCT TROPONIN-I WITH HOLD TUBE    Narrative:     The following orders were created for panel order POC Troponin I with Hold Tube.  Procedure                               Abnormality         Status                     ---------                               -----------         ------                     POC Troponin I[[id number]]                                                              HOLD Troponin-I Tube[[id number]]                                                          Please view results for these tests on the individual orders.   HOLD ISTAT TROPONIN-I TUBE       XR Chest 1 View    Result Date: 11/9/2021  Narrative: PROCEDURE: XR CHEST 1 VW  COMPARISON: [facility], CR, CHEST PA/AP & LAT 2V, 9/17/2020, 18:37.  INDICATIONS: Chest Pain triage protocol  FINDINGS:  LUNGS: Normal.  No significant pulmonary parenchymal abnormalities.  VASCULATURE: Normal.  Unremarkable pulmonary vasculature.  CARDIAC: Normal.  No cardiac silhouette abnormality or cardiomegaly.  MEDIASTINUM: Normal.  No visible mass or adenopathy.  PLEURA: Normal.  No effusion or pleural thickening.  BONES: Normal.  No fracture or visible bony lesion.  OTHER: Negative.   CONCLUSION: No acute disease.  No significant  change has occurred.        NILDA LARSON MD       Electronically Signed and Approved By: NILDA LARSON MD on 11/09/2021 at 14:17                                                        MDM  Number of Diagnoses or Management Options  Chest pain, unspecified type  Diagnosis management comments: The patient had been symptom-free in the emergency department his entire stay.  I informed the patient that his tests were negative that we could discharge him home.  After I had written for discharge the patient indicated to the nurse that he was having a headache and this was not addressed.  I ordered Toradol for the patient's headache and noted to the nurse that the patient had never complained of a headache his entire ED stay.  The patient does have a chronic history of migraines however.  After being medicated the patient be discharged home in stable condition.    The heart score for this patient is 3.       Amount and/or Complexity of Data Reviewed  Clinical lab tests: reviewed  Tests in the radiology section of CPT®: reviewed  Tests in the medicine section of CPT®: reviewed  Decide to obtain previous medical records or to obtain history from someone other than the patient: yes  Review and summarize past medical records: yes  Independent visualization of images, tracings, or specimens: yes    Patient Progress  Patient progress: improved      Final diagnoses:   Chest pain, unspecified type       ED Disposition  ED Disposition     ED Disposition Condition Comment    Discharge Stable           Christofer Lagos,   [address] [address]  [phone]    In 1 day  Call for appointment         Medication List      No changes were made to your prescriptions during this visit.          Scooby Bush DO  11/09/21 2087

## 2021-12-08 ENCOUNTER — OFFICE VISIT (OUTPATIENT)
Dept: FAMILY MEDICINE CLINIC | Facility: CLINIC | Age: 61
End: 2021-12-08

## 2021-12-08 VITALS
WEIGHT: 187.7 LBS | DIASTOLIC BLOOD PRESSURE: 82 MMHG | OXYGEN SATURATION: 96 % | SYSTOLIC BLOOD PRESSURE: 136 MMHG | TEMPERATURE: 97.6 F | HEART RATE: 97 BPM | BODY MASS INDEX: 29.46 KG/M2 | HEIGHT: 67 IN

## 2021-12-08 DIAGNOSIS — R05.9 COUGH: ICD-10-CM

## 2021-12-08 DIAGNOSIS — J01.00 ACUTE MAXILLARY SINUSITIS, RECURRENCE NOT SPECIFIED: Primary | ICD-10-CM

## 2021-12-08 DIAGNOSIS — J02.9 SORE THROAT: ICD-10-CM

## 2021-12-08 DIAGNOSIS — M54.2 NECK PAIN: ICD-10-CM

## 2021-12-08 LAB
EXPIRATION DATE: NORMAL
EXPIRATION DATE: NORMAL
FLUAV AG NPH QL: NEGATIVE
FLUBV AG NPH QL: NEGATIVE
INTERNAL CONTROL: NORMAL
INTERNAL CONTROL: NORMAL
Lab: NORMAL
Lab: NORMAL
S PYO AG THROAT QL: NEGATIVE

## 2021-12-08 PROCEDURE — 87635 SARS-COV-2 COVID-19 AMP PRB: CPT | Performed by: FAMILY MEDICINE

## 2021-12-08 PROCEDURE — 99214 OFFICE O/P EST MOD 30 MIN: CPT | Performed by: FAMILY MEDICINE

## 2021-12-08 PROCEDURE — 87804 INFLUENZA ASSAY W/OPTIC: CPT | Performed by: FAMILY MEDICINE

## 2021-12-08 PROCEDURE — 87880 STREP A ASSAY W/OPTIC: CPT | Performed by: FAMILY MEDICINE

## 2021-12-08 RX ORDER — METHOCARBAMOL 500 MG/1
1000 TABLET, FILM COATED ORAL NIGHTLY PRN
Qty: 60 TABLET | Refills: 1 | Status: SHIPPED | OUTPATIENT
Start: 2021-12-08 | End: 2022-05-31

## 2021-12-08 RX ORDER — AMOXICILLIN 875 MG/1
875 TABLET, COATED ORAL 2 TIMES DAILY
Qty: 14 TABLET | Refills: 0 | Status: SHIPPED | OUTPATIENT
Start: 2021-12-08 | End: 2021-12-15

## 2021-12-08 RX ORDER — BROMPHENIRAMINE MALEATE, PSEUDOEPHEDRINE HYDROCHLORIDE, AND DEXTROMETHORPHAN HYDROBROMIDE 2; 30; 10 MG/5ML; MG/5ML; MG/5ML
5 SYRUP ORAL 4 TIMES DAILY PRN
Qty: 140 ML | Refills: 0 | Status: SHIPPED | OUTPATIENT
Start: 2021-12-08 | End: 2021-12-15

## 2021-12-08 RX ORDER — PREDNISONE 20 MG/1
40 TABLET ORAL DAILY
Qty: 10 TABLET | Refills: 0 | Status: SHIPPED | OUTPATIENT
Start: 2021-12-08 | End: 2021-12-13

## 2021-12-08 NOTE — PROGRESS NOTES
"Chief Complaint  Headache, Sore Throat, and Neck Pain    Subjective          Leandro Salcedo presents to [facility]  History of Present Illness  Patient presents today complaining of headache, sore throat, and neck pain.  He reports that 2 days ago on 12/6/2021 he started experiencing cough, head congestion, and sore throat.  He denies any fever chills.  Reports his son was recently tested for Covid and was negative.  He does have a cough every now and then.  He denies any shortness of breath, fever chills.  Denies any loss of taste or smell.  He has received his COVID-19 vaccination and will receive his second 1 next week.  He also complains of neck pain.  We did discuss this on the last visit.  He had neck pain for about 7 days.  He had woken up with the neck pain.  He thought he may have slept wrong.  His pain has persisted.  It has been over a month now.  I discussed with patient getting an x-ray for further evaluation.  Objective   Vital Signs:   /82   Pulse 97   Temp 97.6 °F (36.4 °C)   Ht 168.9 cm (66.5\")   Wt 85.1 kg (187 lb 11.2 oz)   SpO2 96%   BMI 29.84 kg/m²     Physical Exam   General: AAO ×3, no acute distress, pleasant  HEENT: Normocephalic, atraumatic, no discharge in the eyes, nasal congestion right worse than left with maxillary and frontal sinus tenderness to palpation, there is oropharyngeal erythema with postnasal drip, no lymphadenopathy appreciated in the neck, TM intact bilaterally with no erythema or evidence of perforation  Musculoskeletal: Paraspinal hypertonicity of the cervical spine noted bilaterally more prominent at the base.  Cardiovascular: Regular rate and rhythm without appreciable murmur  Respiratory: Clear to auscultation bilaterally no RRW  Gastrointestinal: Soft nontender nondistended with bowel sounds present  extremities: No edema  Neurologic: CN II through XII grossly intact   Psychiatric: Normal mood and affect  Result Review : "                 Assessment and Plan    Diagnoses and all orders for this visit:    1. Acute maxillary sinusitis, recurrence not specified (Primary)    2. Sore throat  -     POCT rapid strep A    3. Cough  -     POCT Influenza A/B  -     COVID-19,CEPHEID/CHASITY/BDMAX,COR/LOLA/PAD/ORALIA IN-HOUSE(OR EMERGENT/ADD-ON),NP SWAB IN TRANSPORT MEDIA 3-4 HR TAT, RT-PCR - Swab, Nasopharynx; Future  -     COVID-19,CEPHEID/CHASITY/BDMAX,COR/LOLA/PAD/ORALIA IN-HOUSE(OR EMERGENT/ADD-ON),NP SWAB IN TRANSPORT MEDIA 3-4 HR TAT, RT-PCR - Swab, Nasopharynx    4. Neck pain  -     XR Spine Cervical Complete 4 or 5 View; Future    Other orders  -     amoxicillin (AMOXIL) 875 MG tablet; Take 1 tablet by mouth 2 (Two) Times a Day for 7 days.  Dispense: 14 tablet; Refill: 0  -     brompheniramine-pseudoephedrine-DM 30-2-10 MG/5ML syrup; Take 5 mL by mouth 4 (Four) Times a Day As Needed for Allergies for up to 7 days.  Dispense: 140 mL; Refill: 0  -     predniSONE (DELTASONE) 20 MG tablet; Take 2 tablets by mouth Daily for 5 days.  Dispense: 10 tablet; Refill: 0  -     methocarbamol (Robaxin) 500 MG tablet; Take 2 tablets by mouth At Night As Needed for Muscle Spasms.  Dispense: 60 tablet; Refill: 1  -     Diclofenac Sodium (Voltaren) 1 % gel gel; Apply 4 g topically to the appropriate area as directed 4 (Four) Times a Day As Needed (joint pain).  Dispense: 100 g; Refill: 1    Patient has taken Flexeril for treatment of acute neck pain.  He has not had much improvement with this.  I will switch him to Robaxin and go ahead and get a dedicated x-ray of the cervical spine.  I discussed with patient treatment for maxillary sinusitis.  I will order Covid test as well. Patient tested negative for flu and strep today. Amoxicillin, Bromfed and prednisone has been prescribed.  Patient encouraged to call with any questions or concerns.  Plan on seeing him back for his next regular scheduled appointment or sooner if needed.  He is instructed to quarantine at home  until results return.  He has been out of work today and I will keep him out of work tomorrow as well pending results.  These 2 days will be 12/8/2021 and 12/9/2021.    Follow Up   Return if symptoms worsen or fail to improve.  Patient was given instructions and counseling regarding his condition or for health maintenance advice. Please see specific information pulled into the AVS if appropriate.

## 2021-12-09 LAB — SARS-COV-2 N GENE RESP QL NAA+PROBE: NOT DETECTED

## 2021-12-21 ENCOUNTER — PREP FOR SURGERY (OUTPATIENT)
Dept: OTHER | Facility: HOSPITAL | Age: 61
End: 2021-12-21

## 2021-12-21 ENCOUNTER — TELEPHONE (OUTPATIENT)
Dept: GASTROENTEROLOGY | Facility: CLINIC | Age: 61
End: 2021-12-21

## 2021-12-21 ENCOUNTER — CLINICAL SUPPORT (OUTPATIENT)
Dept: GASTROENTEROLOGY | Facility: CLINIC | Age: 61
End: 2021-12-21

## 2021-12-21 DIAGNOSIS — Z12.11 COLON CANCER SCREENING: Primary | ICD-10-CM

## 2021-12-21 RX ORDER — SODIUM, POTASSIUM,MAG SULFATES 17.5-3.13G
2 SOLUTION, RECONSTITUTED, ORAL ORAL TAKE AS DIRECTED
Qty: 354 ML | Refills: 0 | Status: SHIPPED | OUTPATIENT
Start: 2021-12-21 | End: 2022-03-11 | Stop reason: SDUPTHER

## 2021-12-21 NOTE — TELEPHONE ENCOUNTER
Leandro Salcedo  REASON FOR CALL encounter for colon screening  SENT IN PREP suprep  Past Medical History:   Diagnosis Date   • Allergies    • Arthritis    • Colon cancer (HCC)    • Condition not found     hernia   • De Quervain's fracture of right wrist 07/25/2017   • Headache, cluster    • High blood pressure    • High cholesterol    • Migraine    • Need for hepatitis C screening test 2017    neg per patient   • Need for shingles vaccine     done   • Primary osteoarthritis 07/25/2017    Right 1st [facility] primary osteoarthritis   • Prostate cancer screening 06/12/2019    PSA 0.43   • Renal calculus or stone      Allergies   Allergen Reactions   • Tigan [Trimethobenzamide] Anaphylaxis     Past Surgical History:   Procedure Laterality Date   • ANKLE SURGERY     • COLONOSCOPY  2018    [facility]   • EYE SURGERY     • INGUINAL HERNIA REPAIR Right 04/07/2015   • JOINT REPLACEMENT     • LIPOMA EXCISION     • SHOULDER SURGERY     • TONSILLECTOMY     • TOTAL KNEE ARTHROPLASTY Left      Social History     Socioeconomic History   • Marital status:    Tobacco Use   • Smoking status: Never Smoker   • Smokeless tobacco: Never Used   Vaping Use   • Vaping Use: Never used   Substance and Sexual Activity   • Alcohol use: Never   • Drug use: Never   • Sexual activity: Never     Family History   Family history unknown: Yes       Current Outpatient Medications:   •  aspirin 81 MG chewable tablet, , Disp: , Rfl:   •  butalbital-acetaminophen-caffeine (Fioricet) -40 MG capsule capsule, Fioricet -40 mg oral capsule take 1 capsule by oral route every 8 hours as needed   Active, Disp: , Rfl:   •  celecoxib (CeleBREX) 200 MG capsule, , Disp: , Rfl:   •  diclofenac (VOLTAREN) 75 MG EC tablet, Take 1 tablet by mouth 2 (Two) Times a Day., Disp: 60 tablet, Rfl: 1  •  Diclofenac Sodium (Voltaren) 1 % gel gel, Apply 4 g topically to the appropriate area as directed 4 (Four) Times a Day As Needed (joint pain)., Disp: 100 g, Rfl: 1  •   gabapentin (NEURONTIN) 300 MG capsule, , Disp: , Rfl:   •  hydroCHLOROthiazide (HYDRODIURIL) 25 MG tablet, , Disp: , Rfl:   •  lidocaine (XYLOCAINE) 5 % ointment, , Disp: , Rfl:   •  lisinopril (PRINIVIL,ZESTRIL) 40 MG tablet, , Disp: , Rfl:   •  methocarbamol (Robaxin) 500 MG tablet, Take 2 tablets by mouth At Night As Needed for Muscle Spasms., Disp: 60 tablet, Rfl: 1  •  propranolol LA (INDERAL LA) 160 MG 24 hr capsule, , Disp: , Rfl:   •  simvastatin (ZOCOR) 20 MG tablet, , Disp: , Rfl:   •  traMADol (ULTRAM) 50 MG tablet, , Disp: , Rfl:

## 2021-12-21 NOTE — PROGRESS NOTES
SPOKE WITH PT ON A DATE FOR COLONOSCOPY OF 3/18/22 . MADE SURE CHART WAS UP TO DATE. WENT OVER PREP AND MAILED OUT INSTRUCTIONS. PUT IN ORDER FOR COLON AND SENT PREP TO PHARMACY

## 2021-12-30 ENCOUNTER — TELEPHONE (OUTPATIENT)
Dept: FAMILY MEDICINE CLINIC | Facility: CLINIC | Age: 61
End: 2021-12-30

## 2021-12-30 NOTE — TELEPHONE ENCOUNTER
Please inform patient that the orders for an x-ray of the cervical spine.  This can be done at the hospital at his convenience.  He should still be able to do this after 4 PM as they are open till 5 PM.  The diagnostic imaging center at the hospital is open from 7:30 PM.

## 2021-12-30 NOTE — TELEPHONE ENCOUNTER
Caller: Leandro Salcedo    Relationship: Self    Best call back number: [phone]    What is the best time to reach you: ANYTIME    Who are you requesting to speak with (clinical staff, provider,  specific staff member): CLINICAL      What was the call regarding: THE PATIENT RECEIVED A LETTER STATING HIS CERVICAL SPINE TEST IS DUE. HE STATED HE IS ONLY AVAILABLE AFTER 4PM FOR SCHEDULING AND WOULD LIKE A CALL BACK TO DISCUSS    Do you require a callback: YES

## 2022-01-27 ENCOUNTER — TELEPHONE (OUTPATIENT)
Dept: FAMILY MEDICINE CLINIC | Facility: CLINIC | Age: 62
End: 2022-01-27

## 2022-01-27 DIAGNOSIS — M65.4 DE QUERVAIN'S TENOSYNOVITIS, RIGHT: ICD-10-CM

## 2022-01-27 DIAGNOSIS — G56.01 CARPAL TUNNEL SYNDROME OF RIGHT WRIST: Primary | ICD-10-CM

## 2022-01-27 NOTE — TELEPHONE ENCOUNTER
Caller: Leandro Salcedo    Relationship: Self    Best call back number:[phone]    What is the medical concern/diagnosis: LEFT/ RIGHT WRIST CARPAL TUNNEL. RIGHT SHOULDER PAIN    What specialty or service is being requested: ORTHOPEDIC     Any additional details: PATIENT STATES THAT HE ALREADY HAD A REFERRAL, IT JUST .

## 2022-01-31 ENCOUNTER — OFFICE VISIT (OUTPATIENT)
Dept: FAMILY MEDICINE CLINIC | Facility: CLINIC | Age: 62
End: 2022-01-31

## 2022-01-31 VITALS
SYSTOLIC BLOOD PRESSURE: 128 MMHG | TEMPERATURE: 98.5 F | HEART RATE: 60 BPM | OXYGEN SATURATION: 97 % | DIASTOLIC BLOOD PRESSURE: 70 MMHG | BODY MASS INDEX: 29.84 KG/M2 | HEIGHT: 67 IN | WEIGHT: 190.1 LBS

## 2022-01-31 DIAGNOSIS — G43.809 OTHER MIGRAINE WITHOUT STATUS MIGRAINOSUS, NOT INTRACTABLE: Primary | ICD-10-CM

## 2022-01-31 DIAGNOSIS — R11.2 NAUSEA AND VOMITING, INTRACTABILITY OF VOMITING NOT SPECIFIED, UNSPECIFIED VOMITING TYPE: ICD-10-CM

## 2022-01-31 PROCEDURE — 96372 THER/PROPH/DIAG INJ SC/IM: CPT | Performed by: FAMILY MEDICINE

## 2022-01-31 PROCEDURE — 99213 OFFICE O/P EST LOW 20 MIN: CPT | Performed by: FAMILY MEDICINE

## 2022-01-31 RX ORDER — PROMETHAZINE HYDROCHLORIDE 25 MG/ML
12.5 INJECTION, SOLUTION INTRAMUSCULAR; INTRAVENOUS EVERY 6 HOURS PRN
Status: DISCONTINUED | OUTPATIENT
Start: 2022-01-31 | End: 2022-01-31

## 2022-01-31 RX ORDER — KETOROLAC TROMETHAMINE 30 MG/ML
60 INJECTION, SOLUTION INTRAMUSCULAR; INTRAVENOUS ONCE
Status: COMPLETED | OUTPATIENT
Start: 2022-01-31 | End: 2022-01-31

## 2022-01-31 RX ORDER — PROMETHAZINE HYDROCHLORIDE 25 MG/1
25 TABLET ORAL EVERY 8 HOURS PRN
Qty: 21 TABLET | Refills: 0 | Status: SHIPPED | OUTPATIENT
Start: 2022-01-31 | End: 2022-02-07

## 2022-01-31 RX ORDER — PROMETHAZINE HYDROCHLORIDE 25 MG/ML
12.5 INJECTION, SOLUTION INTRAMUSCULAR; INTRAVENOUS EVERY 6 HOURS PRN
Status: DISCONTINUED | OUTPATIENT
Start: 2022-01-31 | End: 2023-03-29

## 2022-01-31 RX ORDER — SUMATRIPTAN 100 MG/1
TABLET, FILM COATED ORAL
Qty: 30 TABLET | Refills: 1 | Status: SHIPPED | OUTPATIENT
Start: 2022-01-31 | End: 2022-03-14 | Stop reason: ALTCHOICE

## 2022-01-31 RX ADMIN — PROMETHAZINE HYDROCHLORIDE 12.5 MG: 25 INJECTION, SOLUTION INTRAMUSCULAR; INTRAVENOUS at 11:53

## 2022-01-31 RX ADMIN — KETOROLAC TROMETHAMINE 60 MG: 30 INJECTION, SOLUTION INTRAMUSCULAR; INTRAVENOUS at 11:40

## 2022-01-31 RX ADMIN — PROMETHAZINE HYDROCHLORIDE 12.5 MG: 25 INJECTION, SOLUTION INTRAMUSCULAR; INTRAVENOUS at 11:42

## 2022-01-31 NOTE — PROGRESS NOTES
"Chief Complaint  Headache    Subjective          Leandro Salcedo presents to [facility]  History of Present Illness  Patient presents today complaining of a migraine headache.  He reports that started about 5:00 this morning.  He normally takes Fioricet which does help.  Today did not.  He will get a headache that is more difficult to control about once or twice a month.  He presents today for the same reason.  Reports that he has pain on the left side of his head.  Symptoms have been ongoing.  He also has had nausea and vomiting associated with this.  Objective   Vital Signs:   /70   Pulse 60   Temp 98.5 °F (36.9 °C)   Ht 168.9 cm (66.5\")   Wt 86.2 kg (190 lb 1.6 oz)   SpO2 97%   BMI 30.22 kg/m²     Physical Exam   General: AAO ×3, no acute distress  HEENT: Normocephalic, atraumatic  Cardiovascular: Regular rate and rhythm without appreciable murmur  Respiratory: Clear to auscultation bilaterally no RRW  Neurologic: CN II through XII grossly intact   Psychiatric: Normal mood and affect  Result Review :                 Assessment and Plan {CC Problem List  Visit Diagnosis   ROS  Review (Popup)  Health Maintenance  Quality  BestPractice  Medications  SmartSets  SnapShot Encounters  Media :23}   Diagnoses and all orders for this visit:    1. Other migraine without status migrainosus, not intractable (Primary)    2. Nausea and vomiting, intractability of vomiting not specified, unspecified vomiting type    Other orders  -     promethazine (PHENERGAN) 25 MG tablet; Take 1 tablet by mouth Every 8 (Eight) Hours As Needed for Nausea or Vomiting for up to 7 days.  Dispense: 21 tablet; Refill: 0  -     SUMAtriptan (Imitrex) 100 MG tablet; Take one tablet at onset of headache. May repeat dose one time in 2 hours if headache not relieved.  Dispense: 30 tablet; Refill: 1    I discussed with patient treatment for migraine headache today.  I will give him prescription for " Phenergan as this works better than Zofran has.  I will also give him prescription for Imitrex.  Plan to give him a Toradol and Phenergan injection today.  Patient instructed to call or return if he has any worsening symptoms or no improvement.  He is encouraged to keep his next appointment on 2/9/2022.    Follow Up   Return if symptoms worsen or fail to improve.  Patient was given instructions and counseling regarding his condition or for health maintenance advice. Please see specific information pulled into the AVS if appropriate.

## 2022-01-31 NOTE — TELEPHONE ENCOUNTER
Referral has been put in waiting for provider to sign off on and will contact patient and let him know.

## 2022-02-15 ENCOUNTER — OFFICE VISIT (OUTPATIENT)
Dept: ORTHOPEDIC SURGERY | Facility: CLINIC | Age: 62
End: 2022-02-15

## 2022-02-15 ENCOUNTER — TELEPHONE (OUTPATIENT)
Dept: FAMILY MEDICINE CLINIC | Facility: CLINIC | Age: 62
End: 2022-02-15

## 2022-02-15 VITALS — BODY MASS INDEX: 29.82 KG/M2 | HEIGHT: 67 IN | HEART RATE: 63 BPM | WEIGHT: 190 LBS | OXYGEN SATURATION: 97 %

## 2022-02-15 DIAGNOSIS — G56.01 CARPAL TUNNEL SYNDROME OF RIGHT WRIST: Primary | ICD-10-CM

## 2022-02-15 DIAGNOSIS — M25.511 RIGHT SHOULDER PAIN, UNSPECIFIED CHRONICITY: ICD-10-CM

## 2022-02-15 DIAGNOSIS — M65.4 DE QUERVAIN'S TENOSYNOVITIS, RIGHT: ICD-10-CM

## 2022-02-15 PROCEDURE — 20610 DRAIN/INJ JOINT/BURSA W/O US: CPT | Performed by: ORTHOPAEDIC SURGERY

## 2022-02-15 PROCEDURE — 20551 NJX 1 TENDON ORIGIN/INSJ: CPT | Performed by: ORTHOPAEDIC SURGERY

## 2022-02-15 RX ADMIN — BUPIVACAINE HYDROCHLORIDE 1 ML: 2.5 INJECTION, SOLUTION INFILTRATION; PERINEURAL at 14:17

## 2022-02-15 RX ADMIN — TRIAMCINOLONE ACETONIDE 40 MG: 40 INJECTION, SUSPENSION INTRA-ARTICULAR; INTRAMUSCULAR at 14:16

## 2022-02-15 RX ADMIN — TRIAMCINOLONE ACETONIDE 40 MG: 40 INJECTION, SUSPENSION INTRA-ARTICULAR; INTRAMUSCULAR at 14:17

## 2022-02-15 RX ADMIN — BUPIVACAINE HYDROCHLORIDE 5 ML: 2.5 INJECTION, SOLUTION INFILTRATION; PERINEURAL at 14:16

## 2022-02-15 NOTE — TELEPHONE ENCOUNTER
PATIENT MISSED APPOINTMENT ON 02/09/2022 @ 1:15 WITH DR. MEJIA. CALLED NUMBER IN CHART- [phone]. NO ANSWER-LEFT MESSAGE CONCERNING POLICY ON MISSED APPOINTMENTS AND SAME DAY CANCELLATIONS.

## 2022-02-15 NOTE — PROGRESS NOTES
"Chief Complaint  Pain of the Right Wrist and Pain of the Right Shoulder     Subjective      Leandro Salcedo presents to [facility] for an evaluation of right wrist and right shoulder. Patient has a history of carpal tunnel and de quervain’s tenosynovitis of the right wrist. He gets periodic injections for this that do provide him with relief from his symptoms. He also has pain in the right shoulder. He has gotten injections for this in the past that has given him relief in the past.     Allergies   Allergen Reactions   • Tigan [Trimethobenzamide] Anaphylaxis        Social History     Socioeconomic History   • Marital status:    Tobacco Use   • Smoking status: Never Smoker   • Smokeless tobacco: Never Used   Vaping Use   • Vaping Use: Never used   Substance and Sexual Activity   • Alcohol use: Never   • Drug use: Never   • Sexual activity: Never        Review of Systems     Objective   Vital Signs:   Pulse 63   Ht 168.9 cm (66.5\")   Wt 86.2 kg (190 lb)   SpO2 97%   BMI 30.21 kg/m²       Physical Exam  Constitutional:       Appearance: Normal appearance. Patient is well-developed and normal weight.   HENT:      Head: Normocephalic.      Right Ear: Hearing and external ear normal.      Left Ear: Hearing and external ear normal.      Nose: Nose normal.   Eyes:      Conjunctiva/sclera: Conjunctivae normal.   Cardiovascular:      Rate and Rhythm: Normal rate.   Pulmonary:      Effort: Pulmonary effort is normal.      Breath sounds: No wheezing or rales.   Abdominal:      Palpations: Abdomen is soft.      Tenderness: There is no abdominal tenderness.   Musculoskeletal:      Cervical back: Normal range of motion.   Skin:     Findings: No rash.   Neurological:      Mental Status: Patient is alert and oriented to person, place, and time.   Psychiatric:         Mood and Affect: Mood and affect normal.         Judgment: Judgment normal.       Ortho Exam      RIGHT SHOULDER: Forward elevation " with pain. Radial pulse 2+, ulnar pulse 2+. Sensation grossly intact. Neurovascular intact.  Good tone of deltoid, biceps, triceps, wrist extensors, and wrist flexors.  No swelling, skin discoloration or atrophy. Full elbow flexion and extension. Skin intact.       RIGHT WRIST: Neurovascular intact. Sensation grossly intact. No swelling, atrophy, and skin discoloration. Skin intact. Full ROM. Patient able to wiggle fingers and make a fist. Full wrist extension, full wrist flexion, full , full thumb opposition, full PIP flexors, full DIP flexors, full PIP extensors, full finger adduction, full finger abduction. Radial pulse 2+, ulnar pulse 2+. Positive finkelstein's testing. Positive median nerve compression test.       Large Joint Arthrocentesis  Date/Time: 2/15/2022 2:16 PM  Consent given by: patient  Site marked: site marked  Timeout: Immediately prior to procedure a time out was called to verify the correct patient, procedure, equipment, support staff and site/side marked as required   Supporting Documentation  Indications: pain   Procedure Details  Location: shoulder (right) -   Needle gauge: 21g.  Medications administered: 5 mL bupivacaine 0.25 %; 40 mg triamcinolone acetonide 40 MG/ML  Patient tolerance: patient tolerated the procedure well with no immediate complications    Small Joint Arthrocentesis  Consent given by: patient  Site marked: site marked  Timeout: Immediately prior to procedure a time out was called to verify the correct patient, procedure, equipment, support staff and site/side marked as required   Procedure Details  Location: wrist.  Preparation: Patient was prepped and draped in the usual sterile fashion  Needle gauge: 23g.  Medications administered: 1 mL bupivacaine 0.25 %; 40 mg triamcinolone acetonide 40 MG/ML  Patient tolerance: patient tolerated the procedure well with no immediate complications            Imaging Results (Most Recent)     None           Result Review :         No  results found.           Assessment and Plan     DX: Right shoulder pain  Right wrist carpal tunnel syndrome  de quervain’s tenosynovitis, right wrist       Discussed treatment plans with the patient. He opted for another injection in the shoulder and wrist, he tolerated this well.     Call or return if worsening symptoms.    Follow Up     Prn.       Patient was given instructions and counseling regarding his condition or for health maintenance advice. Please see specific information pulled into the AVS if appropriate.     Scribed for Farideh Rucker MD by Meeta Quintana.  02/15/22   13:59 EST        I have personally performed the services described in this document as scribed by the above individual and it is both accurate and complete. Farideh Rucker MD 02/17/22

## 2022-02-16 ENCOUNTER — TELEPHONE (OUTPATIENT)
Dept: FAMILY MEDICINE CLINIC | Facility: CLINIC | Age: 62
End: 2022-02-16

## 2022-02-16 NOTE — TELEPHONE ENCOUNTER
PATIENT MISSED APPOINTMENT ON 02/09/2022 @ 1:15 WITH DR. MEJIA.  CALLED NUMBER IN CHART [phone]. NO ANSWER-SECOND ATTEMPT-SENDING LETTER

## 2022-02-17 RX ORDER — TRIAMCINOLONE ACETONIDE 40 MG/ML
40 INJECTION, SUSPENSION INTRA-ARTICULAR; INTRAMUSCULAR
Status: COMPLETED | OUTPATIENT
Start: 2022-02-15 | End: 2022-02-15

## 2022-02-17 RX ORDER — BUPIVACAINE HYDROCHLORIDE 2.5 MG/ML
1 INJECTION, SOLUTION INFILTRATION; PERINEURAL
Status: COMPLETED | OUTPATIENT
Start: 2022-02-15 | End: 2022-02-15

## 2022-02-17 RX ORDER — BUPIVACAINE HYDROCHLORIDE 2.5 MG/ML
5 INJECTION, SOLUTION INFILTRATION; PERINEURAL
Status: COMPLETED | OUTPATIENT
Start: 2022-02-15 | End: 2022-02-15

## 2022-03-11 ENCOUNTER — TELEPHONE (OUTPATIENT)
Dept: GASTROENTEROLOGY | Facility: CLINIC | Age: 62
End: 2022-03-11

## 2022-03-11 RX ORDER — SODIUM, POTASSIUM,MAG SULFATES 17.5-3.13G
2 SOLUTION, RECONSTITUTED, ORAL ORAL TAKE AS DIRECTED
Qty: 354 ML | Refills: 0 | Status: SHIPPED | OUTPATIENT
Start: 2022-03-11 | End: 2022-03-18

## 2022-03-11 NOTE — TELEPHONE ENCOUNTER
Patient has been r/s for 04/18/2022 with an arrival time of 1200. Patient aware of date and time and location.   Prep was sent to [facility] per patient request.

## 2022-03-14 ENCOUNTER — OFFICE VISIT (OUTPATIENT)
Dept: NEUROLOGY | Facility: CLINIC | Age: 62
End: 2022-03-14

## 2022-03-14 VITALS
SYSTOLIC BLOOD PRESSURE: 120 MMHG | HEIGHT: 67 IN | DIASTOLIC BLOOD PRESSURE: 78 MMHG | BODY MASS INDEX: 29.1 KG/M2 | HEART RATE: 55 BPM | WEIGHT: 185.4 LBS

## 2022-03-14 DIAGNOSIS — G43.719 INTRACTABLE CHRONIC MIGRAINE WITHOUT AURA AND WITHOUT STATUS MIGRAINOSUS: Primary | ICD-10-CM

## 2022-03-14 PROCEDURE — 99215 OFFICE O/P EST HI 40 MIN: CPT | Performed by: NURSE PRACTITIONER

## 2022-03-14 RX ORDER — RIZATRIPTAN BENZOATE 10 MG/1
10 TABLET ORAL ONCE AS NEEDED
Qty: 12 TABLET | Refills: 3 | Status: SHIPPED | OUTPATIENT
Start: 2022-03-14 | End: 2022-11-28

## 2022-03-14 NOTE — PROGRESS NOTES
"Chief Complaint  Migraine    Subjective          Leandro Salcedo presents to [facility]  He reports that he developed headaches many years ago. Since that time, his headaches have progressively worsened.   Currently, he reports headaches that are located frontal. He characterizes the headaches as 8/10 in severity, piercing in nature with associated photophobia, phonophobia and nausea. He reports headaches last 6-8 hours. He reports 16 headache days per month. He endorses associated visual aura. He denies focal numbness, weakness, speech and vision changes.   Triggers: denies any known triggers   Symptoms improved by: Dark quiet room and Sleep   He states he is sleeping poorly. Reports getting 3-5 hours of sleep per night. endorses snoring. Reports refreshing sleep.   Prior prophylactic medications include: topiramate, propranolol, gabapentin,   He  uses abortive therapy such as: imitrex, fioricet   Caffeine Use: 1 servings daily  History of Kidney Stones: Yes  He denies a family history of cerebral aneurysm.     States he used to get Botox and had significant reduction in headaches.  States that his last injections was over a year ago.  States the facility he was getting botox at was using sedation before the botox and he was struggling to have a .        Objective   Vital Signs:   /78   Pulse 55   Ht 168.9 cm (66.5\")   Wt 84.1 kg (185 lb 6.4 oz)   BMI 29.48 kg/m²     Physical Exam  HENT:      Head: Normocephalic.   Pulmonary:      Effort: Pulmonary effort is normal.   Neurological:      Mental Status: He is alert and oriented to person, place, and time.      Cranial Nerves: Cranial nerves are intact.      Sensory: Sensation is intact.      Motor: Motor function is intact.      Coordination: Coordination is intact.      Deep Tendon Reflexes: Reflexes are normal and symmetric.        Neurologic Exam     Mental Status   Oriented to person, place, and time.    "     Result Review :               Assessment and Plan    Diagnoses and all orders for this visit:    1. Intractable chronic migraine without aura and without status migrainosus (Primary)  Assessment & Plan:  We will start Botox for preventative therapy of migraine and Maxalt as needed for abortive therapy.  Discussed that Fioricet leads to medication overuse headache and recommended he discontinue that completely.          Other orders  -     rizatriptan (Maxalt) 10 MG tablet; Take 1 tablet by mouth 1 (One) Time As Needed for Migraine. May repeat in 2 hours if needed  Dispense: 12 tablet; Refill: 3    I spent 42 minutes caring for Leandro on this date of service. This time includes time spent by me in the following activities:preparing for the visit, reviewing tests, obtaining and/or reviewing a separately obtained history, performing a medically appropriate examination and/or evaluation , counseling and educating the patient/family/caregiver, ordering medications, tests, or procedures, documenting information in the medical record and independently interpreting results and communicating that information with the patient/family/caregiver  Follow Up   No follow-ups on file.  Patient was given instructions and counseling regarding his condition or for health maintenance advice. Please see specific information pulled into the AVS if appropriate.

## 2022-03-14 NOTE — PATIENT INSTRUCTIONS
Migraine Headache  A migraine headache is an intense, throbbing pain on one side or both sides of the head. Migraine headaches may also cause other symptoms, such as nausea, vomiting, and sensitivity to light and noise. A migraine headache can last from 4 hours to 3 days. Talk with your doctor about what things may bring on (trigger) your migraine headaches.  What are the causes?  The exact cause of this condition is not known. However, a migraine may be caused when nerves in the brain become irritated and release chemicals that cause inflammation of blood vessels. This inflammation causes pain. This condition may be triggered or caused by:  Drinking alcohol.  Smoking.  Taking medicines, such as:  Medicine used to treat chest pain (nitroglycerin).  Birth control pills.  Estrogen.  Certain blood pressure medicines.  Eating or drinking products that contain nitrates, glutamate, aspartame, or tyramine. Aged cheeses, chocolate, or caffeine may also be triggers.  Doing physical activity.  Other things that may trigger a migraine headache include:  Menstruation.  Pregnancy.  Hunger.  Stress.  Lack of sleep or too much sleep.  Weather changes.  Fatigue.  What increases the risk?  The following factors may make you more likely to experience migraine headaches:  Being a certain age. This condition is more common in people who are 25-55 years old.  Being female.  Having a family history of migraine headaches.  Being .  Having a mental health condition, such as depression or anxiety.  Being obese.  What are the signs or symptoms?  The main symptom of this condition is pulsating or throbbing pain. This pain may:  Happen in any area of the head, such as on one side or both sides.  Interfere with daily activities.  Get worse with physical activity.  Get worse with exposure to bright lights or loud noises.  Other symptoms may include:  Nausea.  Vomiting.  Dizziness.  General sensitivity to bright lights, loud noises, or  smells.  Before you get a migraine headache, you may get warning signs (an aura). An aura may include:  Seeing flashing lights or having blind spots.  Seeing bright spots, halos, or zigzag lines.  Having tunnel vision or blurred vision.  Having numbness or a tingling feeling.  Having trouble talking.  Having muscle weakness.  Some people have symptoms after a migraine headache (postdromal phase), such as:  Feeling tired.  Difficulty concentrating.  How is this diagnosed?  A migraine headache can be diagnosed based on:  Your symptoms.  A physical exam.  Tests, such as:  CT scan or an MRI of the head. These imaging tests can help rule out other causes of headaches.  Taking fluid from the spine (lumbar puncture) and analyzing it (cerebrospinal fluid analysis, or CSF analysis).  How is this treated?  This condition may be treated with medicines that:  Relieve pain.  Relieve nausea.  Prevent migraine headaches.  Treatment for this condition may also include:  Acupuncture.  Lifestyle changes like avoiding foods that trigger migraine headaches.  Biofeedback.  Cognitive behavioral therapy.  Follow these instructions at home:  Medicines  Take over-the-counter and prescription medicines only as told by your health care provider.  Ask your health care provider if the medicine prescribed to you:  Requires you to avoid driving or using heavy machinery.  Can cause constipation. You may need to take these actions to prevent or treat constipation:  Drink enough fluid to keep your urine pale yellow.  Take over-the-counter or prescription medicines.  Eat foods that are high in fiber, such as beans, whole grains, and fresh fruits and vegetables.  Limit foods that are high in fat and processed sugars, such as fried or sweet foods.  Lifestyle  Do not drink alcohol.  Do not use any products that contain nicotine or tobacco, such as cigarettes, e-cigarettes, and chewing tobacco. If you need help quitting, ask your health care  provider.  Get at least 8 hours of sleep every night.  Find ways to manage stress, such as meditation, deep breathing, or yoga.  General instructions         Keep a journal to find out what may trigger your migraine headaches. For example, write down:  What you eat and drink.  How much sleep you get.  Any change to your diet or medicines.  If you have a migraine headache:  Avoid things that make your symptoms worse, such as bright lights.  It may help to lie down in a dark, quiet room.  Do not drive or use heavy machinery.  Ask your health care provider what activities are safe for you while you are experiencing symptoms.  Keep all follow-up visits as told by your health care provider. This is important.  Contact a health care provider if:  You develop symptoms that are different or more severe than your usual migraine headache symptoms.  You have more than 15 headache days in one month.  Get help right away if:  Your migraine headache becomes severe.  Your migraine headache lasts longer than 72 hours.  You have a fever.  You have a stiff neck.  You have vision loss.  Your muscles feel weak or like you cannot control them.  You start to lose your balance often.  You have trouble walking.  You faint.  You have a seizure.  Summary  A migraine headache is an intense, throbbing pain on one side or both sides of the head. Migraines may also cause other symptoms, such as nausea, vomiting, and sensitivity to light and noise.  This condition may be treated with medicines and lifestyle changes. You may also need to avoid certain things that trigger a migraine headache.  Keep a journal to find out what may trigger your migraine headaches.  Contact your health care provider if you have more than 15 headache days in a month or you develop symptoms that are different or more severe than your usual migraine headache symptoms.  This information is not intended to replace advice given to you by your health care provider. Make sure  you discuss any questions you have with your health care provider.  Document Revised: 04/10/2020 Document Reviewed: 01/30/2020  Elsevier Patient Education © 2021 [employer]

## 2022-03-16 NOTE — ASSESSMENT & PLAN NOTE
We will start Botox for preventative therapy of migraine and Maxalt as needed for abortive therapy.  Discussed that Fioricet leads to medication overuse headache and recommended he discontinue that completely.

## 2022-03-18 RX ORDER — PEG-3350, SODIUM SULFATE, SODIUM CHLORIDE, POTASSIUM CHLORIDE, SODIUM ASCORBATE AND ASCORBIC ACID 7.5-2.691G
1000 KIT ORAL EVERY 12 HOURS
Qty: 1000 ML | Refills: 0 | Status: ON HOLD | OUTPATIENT
Start: 2022-03-18 | End: 2022-04-18

## 2022-03-21 ENCOUNTER — OFFICE VISIT (OUTPATIENT)
Dept: FAMILY MEDICINE CLINIC | Facility: CLINIC | Age: 62
End: 2022-03-21

## 2022-03-21 VITALS
BODY MASS INDEX: 29.19 KG/M2 | WEIGHT: 186 LBS | SYSTOLIC BLOOD PRESSURE: 126 MMHG | OXYGEN SATURATION: 98 % | HEART RATE: 52 BPM | DIASTOLIC BLOOD PRESSURE: 82 MMHG | TEMPERATURE: 97.2 F | HEIGHT: 67 IN

## 2022-03-21 DIAGNOSIS — G43.819 OTHER MIGRAINE WITHOUT STATUS MIGRAINOSUS, INTRACTABLE: Primary | ICD-10-CM

## 2022-03-21 DIAGNOSIS — J30.9 ALLERGIC SINUSITIS: ICD-10-CM

## 2022-03-21 PROCEDURE — 96372 THER/PROPH/DIAG INJ SC/IM: CPT | Performed by: FAMILY MEDICINE

## 2022-03-21 PROCEDURE — 99213 OFFICE O/P EST LOW 20 MIN: CPT | Performed by: FAMILY MEDICINE

## 2022-03-21 RX ORDER — FLUTICASONE PROPIONATE 50 MCG
2 SPRAY, SUSPENSION (ML) NASAL DAILY
Qty: 16 G | Refills: 3 | Status: SHIPPED | OUTPATIENT
Start: 2022-03-21 | End: 2022-11-28

## 2022-03-21 RX ORDER — LORATADINE 10 MG/1
10 TABLET ORAL DAILY
Qty: 90 TABLET | Refills: 1 | Status: SHIPPED | OUTPATIENT
Start: 2022-03-21 | End: 2022-11-28

## 2022-03-21 RX ORDER — KETOROLAC TROMETHAMINE 30 MG/ML
60 INJECTION, SOLUTION INTRAMUSCULAR; INTRAVENOUS ONCE
Status: COMPLETED | OUTPATIENT
Start: 2022-03-21 | End: 2022-03-21

## 2022-03-21 RX ADMIN — KETOROLAC TROMETHAMINE 60 MG: 30 INJECTION, SOLUTION INTRAMUSCULAR; INTRAVENOUS at 13:10

## 2022-03-21 NOTE — PROGRESS NOTES
"Chief Complaint  Migraine and Sinus Problem (Nasal drainage)    Subjective          Leandro Salcedo presents to [facility]  History of Present Illness  Patient presents today to discuss migraine headaches.  He reports he has a migraine headache about once a week.  [facility].  He does get a prescription for Maxalt.  He reports that he woke up this morning and had a migraine headache at about 7 AM.  He reports he has taken Maxalt 10 mg and then repeated it after not having resolution.  We repeated it 2 hours later.  He presents for further evaluation and treatment.  He reports that he may start Botox injections soon but they are waiting on getting it approved.  He reports his allergies have been bothering him as well for the past few days since the weather starts warming up.  He takes over-the-counter Claritin.  He is requesting a prescription today.  He reports having some nasal drainage which has been clear.  He denies any fever chills.  Objective   Vital Signs:   /82   Pulse 52   Temp 97.2 °F (36.2 °C)   Ht 168.9 cm (66.5\")   Wt 84.4 kg (186 lb)   SpO2 98%   BMI 29.57 kg/m²     Physical Exam   General: AAO ×3, no acute distress, pleasant  HEENT: Normocephalic, atraumatic, no discharge in the eyes, nasal congestion noted with maxillary sinus tenderness to palpation on the left with no frontal sinus tenderness to palpation, no oropharyngeal erythema or exudates, and TMs intact bilaterally with no erythema, no cervical tenderness or lymphadenopathy  Cardiovascular: Regular rate and rhythm without appreciable murmur  Respiratory: Clear to auscultation bilaterally no RRW  Gastrointestinal: Soft nontender nondistended with bowel sounds present  extremities: No edema  Neurologic: CN II through XII grossly intact   Psychiatric: Normal mood and affect  Result Review :                 Assessment and Plan    Diagnoses and all orders for this " visit:    1. Other migraine without status migrainosus, intractable (Primary)  -     ketorolac (TORADOL) injection 60 mg    2. Allergic sinusitis    Other orders  -     loratadine (Claritin) 10 MG tablet; Take 1 tablet by mouth Daily.  Dispense: 90 tablet; Refill: 1  -     fluticasone (Flonase) 50 MCG/ACT nasal spray; 2 sprays into the nostril(s) as directed by provider Daily.  Dispense: 16 g; Refill: 3    I discussed with patient a Toradol injection today for migraine headache.  I will give him prescription for Claritin and Flonase for allergic sinusitis.  Patient instructed to call with any questions or concerns.      Follow Up   Return if symptoms worsen or fail to improve.  Patient was given instructions and counseling regarding his condition or for health maintenance advice. Please see specific information pulled into the AVS if appropriate.

## 2022-03-31 NOTE — TELEPHONE ENCOUNTER
Patient called the office and confirmed his procedure date and time. Prep instructions given verbally and mailed to patient.

## 2022-04-11 ENCOUNTER — TELEPHONE (OUTPATIENT)
Dept: GASTROENTEROLOGY | Facility: CLINIC | Age: 62
End: 2022-04-11

## 2022-04-15 ENCOUNTER — TELEPHONE (OUTPATIENT)
Dept: GASTROENTEROLOGY | Facility: CLINIC | Age: 62
End: 2022-04-15

## 2022-04-15 ENCOUNTER — TRANSCRIBE ORDERS (OUTPATIENT)
Dept: ADMINISTRATIVE | Facility: HOSPITAL | Age: 62
End: 2022-04-15

## 2022-04-15 DIAGNOSIS — G90.50 COMPLEX REGIONAL PAIN SYNDROME TYPE 1, AFFECTING UNSPECIFIED SITE: Primary | ICD-10-CM

## 2022-04-15 NOTE — TELEPHONE ENCOUNTER
Patient called stating he did not get any instructions on prep for scope on 4/18/22 @ 12.  Read thru instructions with patient over the phone clear liquid diet all day 4/17 starting at breakfast. Patient verbalized understanding of what of clear liquid diet was. Patient instructed 1st round of Movie Prep would be @ 6pm on 4/17 and only plain water after midnight. Patient verbalized understanding.  Patient instructed the morning of 4/18 Movie Prep would be at 8am with only plain water up to 2 hours before arrival time at 12 for scope.  Patient verbalized understanding

## 2022-04-18 ENCOUNTER — ANESTHESIA EVENT (OUTPATIENT)
Dept: GASTROENTEROLOGY | Facility: HOSPITAL | Age: 62
End: 2022-04-18

## 2022-04-18 ENCOUNTER — HOSPITAL ENCOUNTER (OUTPATIENT)
Facility: HOSPITAL | Age: 62
Setting detail: HOSPITAL OUTPATIENT SURGERY
Discharge: HOME | End: 2022-04-18
Attending: INTERNAL MEDICINE | Admitting: INTERNAL MEDICINE

## 2022-04-18 ENCOUNTER — ANESTHESIA (OUTPATIENT)
Dept: GASTROENTEROLOGY | Facility: HOSPITAL | Age: 62
End: 2022-04-18

## 2022-04-18 VITALS
HEART RATE: 54 BPM | BODY MASS INDEX: 29.09 KG/M2 | SYSTOLIC BLOOD PRESSURE: 113 MMHG | DIASTOLIC BLOOD PRESSURE: 76 MMHG | TEMPERATURE: 98 F | RESPIRATION RATE: 16 BRPM | OXYGEN SATURATION: 98 % | WEIGHT: 182.98 LBS

## 2022-04-18 PROCEDURE — G0105 COLORECTAL SCRN; HI RISK IND: HCPCS | Performed by: INTERNAL MEDICINE

## 2022-04-18 PROCEDURE — 25010000002 PROPOFOL 10 MG/ML EMULSION: Performed by: NURSE ANESTHETIST, CERTIFIED REGISTERED

## 2022-04-18 RX ORDER — SODIUM CHLORIDE, SODIUM LACTATE, POTASSIUM CHLORIDE, CALCIUM CHLORIDE 600; 310; 30; 20 MG/100ML; MG/100ML; MG/100ML; MG/100ML
30 INJECTION, SOLUTION INTRAVENOUS CONTINUOUS
Status: DISCONTINUED | OUTPATIENT
Start: 2022-04-18 | End: 2022-04-18 | Stop reason: HOSPADM

## 2022-04-18 RX ORDER — PROPOFOL 10 MG/ML
VIAL (ML) INTRAVENOUS AS NEEDED
Status: DISCONTINUED | OUTPATIENT
Start: 2022-04-18 | End: 2022-04-18 | Stop reason: SURG

## 2022-04-18 RX ADMIN — PROPOFOL 50 MG: 10 INJECTION, EMULSION INTRAVENOUS at 12:54

## 2022-04-18 RX ADMIN — SODIUM CHLORIDE, POTASSIUM CHLORIDE, SODIUM LACTATE AND CALCIUM CHLORIDE 30 ML/HR: 600; 310; 30; 20 INJECTION, SOLUTION INTRAVENOUS at 12:41

## 2022-04-18 RX ADMIN — PROPOFOL 50 MG: 10 INJECTION, EMULSION INTRAVENOUS at 12:50

## 2022-04-18 RX ADMIN — PROPOFOL 100 MG: 10 INJECTION, EMULSION INTRAVENOUS at 12:45

## 2022-04-18 NOTE — H&P
Pre Procedure History & Physical    Chief Complaint:   Screening colonoscopy      Subjective     HPI:   63 yo M here for screening colonoscopy.    Past Medical History:   Past Medical History:   Diagnosis Date   • Allergies    • Arthritis    • Colon cancer (HCC)    • Condition not found     hernia   • De Quervain's fracture of right wrist 07/25/2017   • Headache, cluster    • High blood pressure    • High cholesterol    • Migraine    • Need for hepatitis C screening test 2017    neg per patient   • Need for shingles vaccine     done   • Primary osteoarthritis 07/25/2017    Right 1st [facility] primary osteoarthritis   • Prostate cancer screening 06/12/2019    PSA 0.43   • Renal calculus or stone        Past Surgical History:  Past Surgical History:   Procedure Laterality Date   • ANKLE SURGERY     • COLONOSCOPY  2018    [facility]   • EYE SURGERY     • INGUINAL HERNIA REPAIR Right 04/07/2015   • JOINT REPLACEMENT     • LIPOMA EXCISION     • SHOULDER SURGERY     • TONSILLECTOMY     • TOTAL KNEE ARTHROPLASTY Left        Family History:  Family History   Family history unknown: Yes       Social History:   reports that he has never smoked. He has never used smokeless tobacco. He reports that he does not drink alcohol and does not use drugs.    Medications:   Facility-Administered Medications Prior to Admission   Medication Dose Route Frequency Provider Last Rate Last Admin   • promethazine (PHENERGAN) injection 12.5 mg  12.5 mg Intramuscular Q6H PRN Christofer Lagos, DO         Medications Prior to Admission   Medication Sig Dispense Refill Last Dose   • aspirin 81 MG chewable tablet       • Diclofenac Sodium (Voltaren) 1 % gel gel Apply 4 g topically to the appropriate area as directed 4 (Four) Times a Day As Needed (joint pain). 100 g 1    • fluticasone (Flonase) 50 MCG/ACT nasal spray 2 sprays into the nostril(s) as directed by provider Daily. 16 g 3    • gabapentin (NEURONTIN) 300 MG capsule 300 mg 3 (Three) Times a Day.      •  hydroCHLOROthiazide (HYDRODIURIL) 25 MG tablet       • lidocaine (XYLOCAINE) 5 % ointment       • lisinopril (PRINIVIL,ZESTRIL) 40 MG tablet       • loratadine (Claritin) 10 MG tablet Take 1 tablet by mouth Daily. 90 tablet 1    • methocarbamol (Robaxin) 500 MG tablet Take 2 tablets by mouth At Night As Needed for Muscle Spasms. 60 tablet 1    • PEG-KCl-NaCl-NaSulf-Na Asc-C (MoviPrep) 100 g reconstituted solution powder Take 1,000 mL by mouth Every 12 (Twelve) Hours. 1000 mL 0    • propranolol LA (INDERAL LA) 160 MG 24 hr capsule       • rizatriptan (Maxalt) 10 MG tablet Take 1 tablet by mouth 1 (One) Time As Needed for Migraine. May repeat in 2 hours if needed 12 tablet 3    • simvastatin (ZOCOR) 20 MG tablet       • traMADol (ULTRAM) 50 MG tablet           Allergies:  Tigan [trimethobenzamide]    ROS:    Pertinent items are noted in HPI     Objective     Weight 83 kg (182 lb 15.7 oz).    Physical Exam   Constitutional: Pt is oriented to person, place, and time and well-developed, well-nourished, and in no distress.   Mouth/Throat: Oropharynx is clear and moist.   Neck: Normal range of motion.   Cardiovascular: Normal rate, regular rhythm and normal heart sounds.    Pulmonary/Chest: Effort normal and breath sounds normal.   Abdominal: Soft. Nontender  Skin: Skin is warm and dry.   Psychiatric: Mood, memory, affect and judgment normal.     Assessment/Plan     Diagnosis:  Screening colonoscopy    Anticipated Surgical Procedure:  Colonoscopy    The risks, benefits, and alternatives of this procedure have been discussed with the patient or the responsible party- the patient understands and agrees to proceed.

## 2022-04-18 NOTE — ANESTHESIA POSTPROCEDURE EVALUATION
Patient: Leandro Salcedo    Procedure Summary     Date: 04/18/22 Room / Location: [facility] ENDOSCOPY 1 / [facility]    Anesthesia Start: 1244 Anesthesia Stop: 1259    Procedure: COLONOSCOPY FOR SCREENING (N/A ) Diagnosis:       Colon cancer screening      (Colon cancer screening [Z12.11])    Surgeons: Jackie Watson MD Provider: Siddharth Rodriguez MD    Anesthesia Type: general ASA Status: 2          Anesthesia Type: general    Vitals  Vitals Value Taken Time   /72 04/18/22 1308   Temp 36.7 °C (98 °F) 04/18/22 1305   Pulse 57 04/18/22 1311   Resp 18 04/18/22 1305   SpO2 98 % 04/18/22 1311   Vitals shown include unvalidated device data.        Post Anesthesia Care and Evaluation    Patient location during evaluation: bedside  Patient participation: complete - patient participated  Level of consciousness: awake  Pain management: adequate  Airway patency: patent  Anesthetic complications: No anesthetic complications  PONV Status: none  Cardiovascular status: acceptable and stable  Respiratory status: acceptable  Hydration status: acceptable    Comments: An Anesthesiologist personally participated in the most demanding procedures (including induction and emergence if applicable) in the anesthesia plan, monitored the course of anesthesia administration at frequent intervals and remained physically present and available for immediate diagnosis and treatment of emergencies.

## 2022-04-18 NOTE — ANESTHESIA PREPROCEDURE EVALUATION
Anesthesia Evaluation     Patient summary reviewed and Nursing notes reviewed   no history of anesthetic complications:  NPO Solid Status: > 8 hours  NPO Liquid Status: > 2 hours           Airway   Mallampati: II  TM distance: >3 FB  Neck ROM: full  No difficulty expected  Dental    (+) implants    Pulmonary - negative pulmonary ROS and normal exam    breath sounds clear to auscultation  Cardiovascular - normal exam  Exercise tolerance: good (4-7 METS)    Rhythm: regular  Rate: normal    (+) hypertension,       Neuro/Psych- negative ROS  GI/Hepatic/Renal/Endo - negative ROS     Musculoskeletal     Abdominal    Substance History - negative use     OB/GYN negative ob/gyn ROS         Other   arthritis,                      Anesthesia Plan    ASA 2     general       Anesthetic plan, all risks, benefits, and alternatives have been provided, discussed and informed consent has been obtained with: patient and other.        CODE STATUS:

## 2022-04-19 ENCOUNTER — TELEPHONE (OUTPATIENT)
Dept: GASTROENTEROLOGY | Facility: CLINIC | Age: 62
End: 2022-04-19

## 2022-04-29 ENCOUNTER — PROCEDURE VISIT (OUTPATIENT)
Dept: NEUROLOGY | Facility: CLINIC | Age: 62
End: 2022-04-29

## 2022-04-29 DIAGNOSIS — G43.719 INTRACTABLE CHRONIC MIGRAINE WITHOUT AURA AND WITHOUT STATUS MIGRAINOSUS: Primary | ICD-10-CM

## 2022-04-29 PROCEDURE — 64615 CHEMODENERV MUSC MIGRAINE: CPT | Performed by: NURSE PRACTITIONER

## 2022-05-04 ENCOUNTER — HOSPITAL ENCOUNTER (OUTPATIENT)
Dept: MRI IMAGING | Facility: HOSPITAL | Age: 62
Discharge: HOME | End: 2022-05-04
Admitting: STUDENT IN AN ORGANIZED HEALTH CARE EDUCATION/TRAINING PROGRAM

## 2022-05-04 DIAGNOSIS — G90.50 COMPLEX REGIONAL PAIN SYNDROME TYPE 1, AFFECTING UNSPECIFIED SITE: ICD-10-CM

## 2022-05-04 PROCEDURE — 72148 MRI LUMBAR SPINE W/O DYE: CPT

## 2022-06-30 ENCOUNTER — OFFICE VISIT (OUTPATIENT)
Dept: NEUROLOGY | Facility: CLINIC | Age: 62
End: 2022-06-30

## 2022-06-30 VITALS
DIASTOLIC BLOOD PRESSURE: 56 MMHG | HEART RATE: 58 BPM | HEIGHT: 66 IN | SYSTOLIC BLOOD PRESSURE: 109 MMHG | BODY MASS INDEX: 29.94 KG/M2 | WEIGHT: 186.3 LBS

## 2022-06-30 DIAGNOSIS — G43.719 INTRACTABLE CHRONIC MIGRAINE WITHOUT AURA AND WITHOUT STATUS MIGRAINOSUS: Primary | ICD-10-CM

## 2022-06-30 PROCEDURE — 99214 OFFICE O/P EST MOD 30 MIN: CPT | Performed by: NURSE PRACTITIONER

## 2022-06-30 RX ORDER — RIMEGEPANT SULFATE 75 MG/75MG
75 TABLET, ORALLY DISINTEGRATING ORAL DAILY PRN
Qty: 8 TABLET | Refills: 3 | Status: SHIPPED | OUTPATIENT
Start: 2022-06-30 | End: 2022-08-12 | Stop reason: SDUPTHER

## 2022-06-30 NOTE — PROGRESS NOTES
"Chief Complaint  Migraine    Subjective          Leandro Salcedo presents to [facility]  he presents to the office for Botox follow-up.  Last Botox injection was on 4/29/22.  States he is having 6 headache days per month.  Feels that he has seen improvement in migraines with Botox for preventative therapy.  Uses  Maxalt without good effect.  Denies side effect.      Prior prophylactic medications include: topiramate, propranolol, gabapentin,   He  uses abortive therapy such as: imitrex, fioricet, maxalt         Objective   Vital Signs:   /56   Pulse 58   Ht 167.6 cm (66\")   Wt 84.5 kg (186 lb 4.8 oz)   BMI 30.07 kg/m²     Physical Exam  HENT:      Head: Normocephalic.   Pulmonary:      Effort: Pulmonary effort is normal.   Neurological:      Mental Status: He is alert and oriented to person, place, and time.      Cranial Nerves: Cranial nerves are intact.      Sensory: Sensation is intact.      Motor: Motor function is intact.      Coordination: Coordination is intact.      Deep Tendon Reflexes: Reflexes are normal and symmetric.        Neurologic Exam     Mental Status   Oriented to person, place, and time.        Result Review :               Assessment and Plan    Diagnoses and all orders for this visit:    1. Intractable chronic migraine without aura and without status migrainosus (Primary)  Assessment & Plan:  We will continue Botox for preventative therapy of migraine and start Nurtec as needed for abortive therapy.          Other orders  -     Rimegepant Sulfate (Nurtec) 75 MG tablet dispersible tablet; Take 1 tablet by mouth Daily As Needed (migraine).  Dispense: 8 tablet; Refill: 3      Follow Up   Return in 29 days (on 7/29/2022) for Botox .  Patient was given instructions and counseling regarding his condition or for health maintenance advice. Please see specific information pulled into the AVS if appropriate.       "

## 2022-06-30 NOTE — ASSESSMENT & PLAN NOTE
We will continue Botox for preventative therapy of migraine and start Nurtec as needed for abortive therapy.

## 2022-07-01 ENCOUNTER — PRIOR AUTHORIZATION (OUTPATIENT)
Dept: NEUROLOGY | Facility: CLINIC | Age: 62
End: 2022-07-01

## 2022-07-01 NOTE — TELEPHONE ENCOUNTER
Nurtec has been approved thru CaseId:[id number];Status:Approved;Review Type:Prior Auth;Coverage Start Date:06/01/2022;Coverage End Date:12/28/2022;

## 2022-07-11 ENCOUNTER — TELEPHONE (OUTPATIENT)
Dept: FAMILY MEDICINE CLINIC | Facility: CLINIC | Age: 62
End: 2022-07-11

## 2022-07-11 DIAGNOSIS — M79.671 FOOT PAIN, BILATERAL: Primary | ICD-10-CM

## 2022-07-11 DIAGNOSIS — M79.672 FOOT PAIN, BILATERAL: Primary | ICD-10-CM

## 2022-07-11 NOTE — TELEPHONE ENCOUNTER
Caller: Leandro Salcedo    Relationship: Self    Best call back number: [phone]    What is the medical concern/diagnosis: ARCH SUPPORT     What specialty or service is being requested: PODIATRY    What is the provider, practice or medical service name: [facility]    What is the office location: [address] [place] [address] , [place] [address]    What is the office phone number: [phone]    Any additional details: REFERRAL ON FILE HAS  AND NEW ONE NEEDS TO BE RESENT REFERRAL IS FOR BOTH FEET

## 2022-08-04 ENCOUNTER — PRIOR AUTHORIZATION (OUTPATIENT)
Dept: NEUROLOGY | Facility: CLINIC | Age: 62
End: 2022-08-04

## 2022-08-08 ENCOUNTER — TELEPHONE (OUTPATIENT)
Dept: FAMILY MEDICINE CLINIC | Facility: CLINIC | Age: 62
End: 2022-08-08

## 2022-08-08 DIAGNOSIS — M25.511 CHRONIC RIGHT SHOULDER PAIN: ICD-10-CM

## 2022-08-08 DIAGNOSIS — G56.01 CARPAL TUNNEL SYNDROME OF RIGHT WRIST: ICD-10-CM

## 2022-08-08 DIAGNOSIS — M79.672 FOOT PAIN, BILATERAL: Primary | ICD-10-CM

## 2022-08-08 DIAGNOSIS — M72.2 PLANTAR FASCIITIS: Primary | ICD-10-CM

## 2022-08-08 DIAGNOSIS — G89.29 CHRONIC RIGHT SHOULDER PAIN: ICD-10-CM

## 2022-08-08 DIAGNOSIS — M79.671 FOOT PAIN, BILATERAL: Primary | ICD-10-CM

## 2022-08-08 NOTE — TELEPHONE ENCOUNTER
Caller: Leandro Salcedo    Relationship: Self    Best call back number: [phone] (W)    What is the medical concern/diagnosis: ARTHRITIS IN RIGHT SHOULDER, CARPAL TUNNEL IN RIGHT WRIST    What specialty or service is being requested: ORTHOPEDICS     What is the provider, practice or medical service name: [facility]     What is the office location: [address] [place] [address]    What is the office phone number: [phone]

## 2022-08-08 NOTE — TELEPHONE ENCOUNTER
Patient appears to have a couple separate issues going on.  Please have him make an appointment to be seen for these issues as he needs evaluation in office.

## 2022-08-08 NOTE — TELEPHONE ENCOUNTER
Caller: Leandro Salcedo    Relationship: Self    Best call back number: [phone]    What is the medical concern/diagnosis: BAD ANKLES, PLANTAR FASCIITIS     What specialty or service is being requested: PODIATRY     What is the provider, practice or medical service name: [facility]    What is the office location: [address] [place] [address]    What is the office phone number: [phone]

## 2022-08-09 NOTE — TELEPHONE ENCOUNTER
Please inform patient that referral has been made.  Based on information provided yesterday it was not clear.  These appeared to be new issues/concerns.  I have placed the renewal referral orders in place.  Thank you.

## 2022-08-26 ENCOUNTER — PROCEDURE VISIT (OUTPATIENT)
Dept: NEUROLOGY | Facility: CLINIC | Age: 62
End: 2022-08-26

## 2022-08-26 DIAGNOSIS — G43.719 INTRACTABLE CHRONIC MIGRAINE WITHOUT AURA AND WITHOUT STATUS MIGRAINOSUS: Primary | ICD-10-CM

## 2022-08-26 PROCEDURE — 64615 CHEMODENERV MUSC MIGRAINE: CPT | Performed by: NURSE PRACTITIONER

## 2022-09-15 ENCOUNTER — LAB (OUTPATIENT)
Dept: LAB | Facility: HOSPITAL | Age: 62
End: 2022-09-15

## 2022-09-15 ENCOUNTER — TRANSCRIBE ORDERS (OUTPATIENT)
Dept: ADMINISTRATIVE | Facility: HOSPITAL | Age: 62
End: 2022-09-15

## 2022-09-15 DIAGNOSIS — G90.59 COMPLEX REGIONAL PAIN SYNDROME TYPE 1 AFFECTING OTHER SITE: Primary | ICD-10-CM

## 2022-09-15 DIAGNOSIS — G90.59 COMPLEX REGIONAL PAIN SYNDROME TYPE 1 AFFECTING OTHER SITE: ICD-10-CM

## 2022-09-15 LAB — MRSA DNA SPEC QL NAA+PROBE: NORMAL

## 2022-09-15 PROCEDURE — 87641 MR-STAPH DNA AMP PROBE: CPT

## 2022-10-27 ENCOUNTER — OFFICE VISIT (OUTPATIENT)
Dept: NEUROLOGY | Facility: CLINIC | Age: 62
End: 2022-10-27

## 2022-10-27 VITALS
BODY MASS INDEX: 30.79 KG/M2 | SYSTOLIC BLOOD PRESSURE: 125 MMHG | HEART RATE: 63 BPM | WEIGHT: 191.6 LBS | DIASTOLIC BLOOD PRESSURE: 77 MMHG | HEIGHT: 66 IN

## 2022-10-27 DIAGNOSIS — G43.719 INTRACTABLE CHRONIC MIGRAINE WITHOUT AURA AND WITHOUT STATUS MIGRAINOSUS: Primary | ICD-10-CM

## 2022-10-27 PROCEDURE — 99213 OFFICE O/P EST LOW 20 MIN: CPT | Performed by: NURSE PRACTITIONER

## 2022-10-27 RX ORDER — BUTALBITAL, ACETAMINOPHEN AND CAFFEINE 50; 325; 40 MG/1; MG/1; MG/1
TABLET ORAL TAKE AS DIRECTED
COMMUNITY
Start: 2022-10-24 | End: 2022-11-28

## 2022-10-27 RX ORDER — RIMEGEPANT SULFATE 75 MG/75MG
75 TABLET, ORALLY DISINTEGRATING ORAL DAILY PRN
Qty: 8 TABLET | Refills: 3 | Status: SHIPPED | OUTPATIENT
Start: 2022-10-27 | End: 2023-02-23 | Stop reason: SDUPTHER

## 2022-10-27 NOTE — PATIENT INSTRUCTIONS
Migraine Headache  A migraine headache is an intense, throbbing pain on one side or both sides of the head. Migraine headaches may also cause other symptoms, such as nausea, vomiting, and sensitivity to light and noise. A migraine headache can last from 4 hours to 3 days. Talk with your doctor about what things may bring on (trigger) your migraine headaches.  What are the causes?  The exact cause of this condition is not known. However, a migraine may be caused when nerves in the brain become irritated and release chemicals that cause inflammation of blood vessels. This inflammation causes pain. This condition may be triggered or caused by:  Drinking alcohol.  Smoking.  Taking medicines, such as:  Medicine used to treat chest pain (nitroglycerin).  Birth control pills.  Estrogen.  Certain blood pressure medicines.  Eating or drinking products that contain nitrates, glutamate, aspartame, or tyramine. Aged cheeses, chocolate, or caffeine may also be triggers.  Doing physical activity.  Other things that may trigger a migraine headache include:  Menstruation.  Pregnancy.  Hunger.  Stress.  Lack of sleep or too much sleep.  Weather changes.  Fatigue.  What increases the risk?  The following factors may make you more likely to experience migraine headaches:  Being a certain age. This condition is more common in people who are 25-55 years old.  Being female.  Having a family history of migraine headaches.  Being .  Having a mental health condition, such as depression or anxiety.  Being obese.  What are the signs or symptoms?  The main symptom of this condition is pulsating or throbbing pain. This pain may:  Happen in any area of the head, such as on one side or both sides.  Interfere with daily activities.  Get worse with physical activity.  Get worse with exposure to bright lights or loud noises.  Other symptoms may include:  Nausea.  Vomiting.  Dizziness.  General sensitivity to bright lights, loud noises, or  smells.  Before you get a migraine headache, you may get warning signs (an aura). An aura may include:  Seeing flashing lights or having blind spots.  Seeing bright spots, halos, or zigzag lines.  Having tunnel vision or blurred vision.  Having numbness or a tingling feeling.  Having trouble talking.  Having muscle weakness.  Some people have symptoms after a migraine headache (postdromal phase), such as:  Feeling tired.  Difficulty concentrating.  How is this diagnosed?  A migraine headache can be diagnosed based on:  Your symptoms.  A physical exam.  Tests, such as:  CT scan or an MRI of the head. These imaging tests can help rule out other causes of headaches.  Taking fluid from the spine (lumbar puncture) and analyzing it (cerebrospinal fluid analysis, or CSF analysis).  How is this treated?  This condition may be treated with medicines that:  Relieve pain.  Relieve nausea.  Prevent migraine headaches.  Treatment for this condition may also include:  Acupuncture.  Lifestyle changes like avoiding foods that trigger migraine headaches.  Biofeedback.  Cognitive behavioral therapy.  Follow these instructions at home:  Medicines  Take over-the-counter and prescription medicines only as told by your health care provider.  Ask your health care provider if the medicine prescribed to you:  Requires you to avoid driving or using heavy machinery.  Can cause constipation. You may need to take these actions to prevent or treat constipation:  Drink enough fluid to keep your urine pale yellow.  Take over-the-counter or prescription medicines.  Eat foods that are high in fiber, such as beans, whole grains, and fresh fruits and vegetables.  Limit foods that are high in fat and processed sugars, such as fried or sweet foods.  Lifestyle  Do not drink alcohol.  Do not use any products that contain nicotine or tobacco, such as cigarettes, e-cigarettes, and chewing tobacco. If you need help quitting, ask your health care  provider.  Get at least 8 hours of sleep every night.  Find ways to manage stress, such as meditation, deep breathing, or yoga.  General instructions     Keep a journal to find out what may trigger your migraine headaches. For example, write down:  What you eat and drink.  How much sleep you get.  Any change to your diet or medicines.  If you have a migraine headache:  Avoid things that make your symptoms worse, such as bright lights.  It may help to lie down in a dark, quiet room.  Do not drive or use heavy machinery.  Ask your health care provider what activities are safe for you while you are experiencing symptoms.  Keep all follow-up visits as told by your health care provider. This is important.  Contact a health care provider if:  You develop symptoms that are different or more severe than your usual migraine headache symptoms.  You have more than 15 headache days in one month.  Get help right away if:  Your migraine headache becomes severe.  Your migraine headache lasts longer than 72 hours.  You have a fever.  You have a stiff neck.  You have vision loss.  Your muscles feel weak or like you cannot control them.  You start to lose your balance often.  You have trouble walking.  You faint.  You have a seizure.  Summary  A migraine headache is an intense, throbbing pain on one side or both sides of the head. Migraines may also cause other symptoms, such as nausea, vomiting, and sensitivity to light and noise.  This condition may be treated with medicines and lifestyle changes. You may also need to avoid certain things that trigger a migraine headache.  Keep a journal to find out what may trigger your migraine headaches.  Contact your health care provider if you have more than 15 headache days in a month or you develop symptoms that are different or more severe than your usual migraine headache symptoms.  This information is not intended to replace advice given to you by your health care provider. Make sure you  discuss any questions you have with your health care provider.  Document Revised: 04/10/2020 Document Reviewed: 01/30/2020  Elsevier Patient Education © 2022 Elsevier Inc.

## 2022-10-27 NOTE — ASSESSMENT & PLAN NOTE
We will continue Botox for preventative therapy of migraine and Nurtec as needed for abortive therapy.      Botox will be administered per accepted algorithm for chronic migraine with a total of 155 units given divided as follows: 10 units in the , 5 units in the procerus, 20 units in the frontalis, 40 units in the temporalis, 30 units in the occipitalis, 20 units in the cervical paraspinals and 30 units in the trapezius.  This therapy will be given every 12 weeks.

## 2022-10-27 NOTE — PROGRESS NOTES
"Chief Complaint  Migraine    Subjective          Leandro Salcedo presents to [facility]  History of Present Illness  he presents to the office for Botox follow-up.  Last Botox injection was on 8/26/22.  States he is having 5 headache days per month.  Feels that he has seen improvement in migraines with Botox for preventative therapy.  Uses  Nurtec with good effect.  Denies side effect.          Objective   Vital Signs:   /77   Pulse 63   Ht 167.6 cm (66\")   Wt 86.9 kg (191 lb 9.6 oz)   BMI 30.93 kg/m²     Physical Exam  HENT:      Head: Normocephalic.   Pulmonary:      Effort: Pulmonary effort is normal.   Neurological:      Mental Status: He is alert and oriented to person, place, and time.      Cranial Nerves: Cranial nerves are intact.      Sensory: Sensation is intact.      Motor: Motor function is intact.      Coordination: Coordination is intact.      Deep Tendon Reflexes: Reflexes are normal and symmetric.        Neurologic Exam     Mental Status   Oriented to person, place, and time.        Result Review :               Assessment and Plan    Diagnoses and all orders for this visit:    1. Intractable chronic migraine without aura and without status migrainosus (Primary)  Assessment & Plan:  We will continue Botox for preventative therapy of migraine and Nurtec as needed for abortive therapy.      Botox will be administered per accepted algorithm for chronic migraine with a total of 155 units given divided as follows: 10 units in the , 5 units in the procerus, 20 units in the frontalis, 40 units in the temporalis, 30 units in the occipitalis, 20 units in the cervical paraspinals and 30 units in the trapezius.  This therapy will be given every 12 weeks.           Other orders  -     Rimegepant Sulfate (Nurtec) 75 MG tablet dispersible tablet; Take 1 tablet by mouth Daily As Needed (migraine).  Dispense: 8 tablet; Refill: 3      Follow Up   Return in 22 " days (on 11/18/2022) for Botox .  Patient was given instructions and counseling regarding his condition or for health maintenance advice. Please see specific information pulled into the AVS if appropriate.

## 2022-12-02 ENCOUNTER — TELEPHONE (OUTPATIENT)
Dept: NEUROLOGY | Facility: CLINIC | Age: 62
End: 2022-12-02

## 2022-12-02 NOTE — TELEPHONE ENCOUNTER
Caller: Laendro Salcedo    Relationship: Self    Best call back number: [phone]    What is the best time to reach you: ANYTIME    Who are you requesting to speak with (clinical staff, provider,  specific staff member): CLINICAL STAFF    Do you know the name of the person who called: N/A    What was the call regarding: PATIENT IS NEEDING TO SCHEDULE BOTOX    Do you require a callback: YES

## 2022-12-30 ENCOUNTER — PROCEDURE VISIT (OUTPATIENT)
Dept: NEUROLOGY | Facility: CLINIC | Age: 62
End: 2022-12-30

## 2022-12-30 DIAGNOSIS — G43.719 INTRACTABLE CHRONIC MIGRAINE WITHOUT AURA AND WITHOUT STATUS MIGRAINOSUS: Primary | ICD-10-CM

## 2022-12-30 PROCEDURE — 64615 CHEMODENERV MUSC MIGRAINE: CPT | Performed by: NURSE PRACTITIONER

## 2023-01-09 ENCOUNTER — TELEPHONE (OUTPATIENT)
Dept: FAMILY MEDICINE CLINIC | Facility: CLINIC | Age: 63
End: 2023-01-09
Payer: OTHER GOVERNMENT

## 2023-01-09 NOTE — TELEPHONE ENCOUNTER
HUB TO READ  ATTEMPTED TO CONTACT PATIENT IN REGARDS TO MISSED VISIT ON JAN 6 2023 @ 9AM. LFT MSG FOR PATIENT TO CONTACT OFFICE TO RESCHEDULE APPOINTMENT AND ADVISED PATIENT OF NO SHOW POLICY.  SENDING LETTER

## 2023-01-09 NOTE — TELEPHONE ENCOUNTER
HUB TO READ  ATTEMPTED TO CONTACT PATIENT IN REGARDS TO MISSED VISIT ON JAN 6 2023 @ 9AM. LFT MSG FOR PATIENT TO CONTACT OFFICE TO RESCHEDULE APPOINTMENT AND ADVISED PATIENT OF NO SHOW POLICY.

## 2023-02-23 ENCOUNTER — OFFICE VISIT (OUTPATIENT)
Dept: NEUROLOGY | Facility: CLINIC | Age: 63
End: 2023-02-23
Payer: OTHER GOVERNMENT

## 2023-02-23 VITALS
HEART RATE: 58 BPM | HEIGHT: 66 IN | SYSTOLIC BLOOD PRESSURE: 128 MMHG | WEIGHT: 187.6 LBS | BODY MASS INDEX: 30.15 KG/M2 | DIASTOLIC BLOOD PRESSURE: 76 MMHG

## 2023-02-23 DIAGNOSIS — G43.719 INTRACTABLE CHRONIC MIGRAINE WITHOUT AURA AND WITHOUT STATUS MIGRAINOSUS: Primary | ICD-10-CM

## 2023-02-23 PROCEDURE — 99213 OFFICE O/P EST LOW 20 MIN: CPT | Performed by: NURSE PRACTITIONER

## 2023-02-23 RX ORDER — RIMEGEPANT SULFATE 75 MG/75MG
75 TABLET, ORALLY DISINTEGRATING ORAL DAILY PRN
Qty: 8 TABLET | Refills: 5 | Status: SHIPPED | OUTPATIENT
Start: 2023-02-23 | End: 2023-02-27 | Stop reason: SDUPTHER

## 2023-02-23 NOTE — PROGRESS NOTES
"Chief Complaint  Migraine    Subjective          Leandro Salcedo presents to [facility]UR[facility]  History of Present Illness  he presents to the office for Botox follow-up.  Last Botox injection was on 12/30/22.  States he is having 5 headache days per month.  Feels that he has seen improvement in migraines with Botox for preventative therapy.  Uses  Nurtec with good effect.  Denies side effect.          Objective   Vital Signs:   /76   Pulse 58   Ht 167.6 cm (66\")   Wt 85.1 kg (187 lb 9.6 oz)   BMI 30.28 kg/m²     Physical Exam  HENT:      Head: Normocephalic.   Pulmonary:      Effort: Pulmonary effort is normal.   Neurological:      Mental Status: He is alert and oriented to person, place, and time.      Sensory: Sensation is intact.      Motor: Motor function is intact.      Coordination: Coordination is intact.      Deep Tendon Reflexes: Reflexes are normal and symmetric.        Neurologic Exam     Mental Status   Oriented to person, place, and time.        Result Review :               Assessment and Plan    Diagnoses and all orders for this visit:    1. Intractable chronic migraine without aura and without status migrainosus (Primary)  Assessment & Plan:  We will continue Botox for preventative therapy of migraine and Nurtec as needed for abortive therapy.      Botox will be administered per accepted algorithm for chronic migraine with a total of 155 units given divided as follows: 10 units in the , 5 units in the procerus, 20 units in the frontalis, 40 units in the temporalis, 30 units in the occipitalis, 20 units in the cervical paraspinals and 30 units in the trapezius.  This therapy will be given every 12 weeks.           Other orders  -     Rimegepant Sulfate (Nurtec) 75 MG tablet dispersible tablet; Take 1 tablet by mouth Daily As Needed (migraine).  Dispense: 8 tablet; Refill: 5      Follow Up   Return in about 29 days (around 3/24/2023) for Botox " .  Patient was given instructions and counseling regarding his condition or for health maintenance advice. Please see specific information pulled into the AVS if appropriate.

## 2023-02-27 ENCOUNTER — TELEPHONE (OUTPATIENT)
Dept: NEUROLOGY | Facility: CLINIC | Age: 63
End: 2023-02-27
Payer: OTHER GOVERNMENT

## 2023-02-27 RX ORDER — RIMEGEPANT SULFATE 75 MG/75MG
75 TABLET, ORALLY DISINTEGRATING ORAL DAILY PRN
Qty: 8 TABLET | Refills: 5 | Status: SHIPPED | OUTPATIENT
Start: 2023-02-27

## 2023-02-27 NOTE — TELEPHONE ENCOUNTER
Provider: PATRICIA DIGGS APRN    Caller: DALY    Relationship to Patient: SELF    Phone Number: [phone]    Reason for Call: PT CALLING TO HAVE THE SCRIPT FOR NURTEC SENT TO [facility].  STATED IT IS $1600 FOR 8 PILLS AT [employer] & FREE AT THE [facility].    PLEASE ADVISE

## 2023-03-01 ENCOUNTER — TELEPHONE (OUTPATIENT)
Dept: NEUROLOGY | Facility: CLINIC | Age: 63
End: 2023-03-01
Payer: OTHER GOVERNMENT

## 2023-03-02 ENCOUNTER — TELEPHONE (OUTPATIENT)
Dept: NEUROLOGY | Facility: CLINIC | Age: 63
End: 2023-03-02
Payer: OTHER GOVERNMENT

## 2023-03-02 NOTE — TELEPHONE ENCOUNTER
Provider: CATERINA  Caller:DALY  Relationship to Patient: SELF  Pharmacy: [facility] -  [facility]   Phone Number:[phone]  Reason for Call: PT CALLED AND STATING THAT THEY PHARMACY IS NEEDING A PRIOR AUTH  TO FILL NURTEC. PT IS UNSURE WHAT IS GOING ON.  PHARMACY PHONE  #[phone]    PT HAS ONE PILL LEFT.    PLEASE REVIEW AND ADVISE.  THANK YOU

## 2023-03-07 NOTE — TELEPHONE ENCOUNTER
ACCORDING TO CMM  ? Information regarding your request  Drug is covered by current benefit plan. No further PA activity needed

## 2023-03-24 ENCOUNTER — PROCEDURE VISIT (OUTPATIENT)
Dept: NEUROLOGY | Facility: CLINIC | Age: 63
End: 2023-03-24
Payer: OTHER GOVERNMENT

## 2023-03-24 DIAGNOSIS — G43.719 INTRACTABLE CHRONIC MIGRAINE WITHOUT AURA AND WITHOUT STATUS MIGRAINOSUS: Primary | ICD-10-CM

## 2023-03-24 PROCEDURE — 64615 CHEMODENERV MUSC MIGRAINE: CPT | Performed by: NURSE PRACTITIONER

## 2023-04-11 ENCOUNTER — OFFICE VISIT (OUTPATIENT)
Dept: FAMILY MEDICINE CLINIC | Facility: CLINIC | Age: 63
End: 2023-04-11
Payer: OTHER GOVERNMENT

## 2023-04-11 VITALS
OXYGEN SATURATION: 97 % | TEMPERATURE: 96.4 F | SYSTOLIC BLOOD PRESSURE: 112 MMHG | HEART RATE: 68 BPM | HEIGHT: 67 IN | BODY MASS INDEX: 29.19 KG/M2 | WEIGHT: 186 LBS | DIASTOLIC BLOOD PRESSURE: 72 MMHG

## 2023-04-11 DIAGNOSIS — R23.3 PETECHIAE: ICD-10-CM

## 2023-04-11 DIAGNOSIS — M54.6 ACUTE BILATERAL THORACIC BACK PAIN: Primary | ICD-10-CM

## 2023-04-11 RX ORDER — KETOROLAC TROMETHAMINE 30 MG/ML
60 INJECTION, SOLUTION INTRAMUSCULAR; INTRAVENOUS ONCE
Status: COMPLETED | OUTPATIENT
Start: 2023-04-11 | End: 2023-04-11

## 2023-04-11 RX ORDER — BACLOFEN 10 MG/1
10 TABLET ORAL 2 TIMES DAILY
Qty: 30 TABLET | Refills: 0 | Status: SHIPPED | OUTPATIENT
Start: 2023-04-11

## 2023-04-11 RX ORDER — DICLOFENAC SODIUM 75 MG/1
75 TABLET, DELAYED RELEASE ORAL 2 TIMES DAILY
Qty: 30 TABLET | Refills: 0 | Status: SHIPPED | OUTPATIENT
Start: 2023-04-11

## 2023-04-11 RX ADMIN — KETOROLAC TROMETHAMINE 60 MG: 30 INJECTION, SOLUTION INTRAMUSCULAR; INTRAVENOUS at 16:01

## 2023-04-11 NOTE — PROGRESS NOTES
"Chief Complaint  Back Pain and Rash (On Right and Left forearm )    Subjective         Leandro Salcedo presents to [facility]  HPI   Presents today for an acute visit for mid back pain.  Patient reports over the past 4 days he has been having back pain between his shoulder blades.  He rates pain 8 out of 10.  Denies numbness or tingling.  Denies recent falls injury or unknown triggers to the back pain.  He takes tramadol 50 mg twice a day and gabapentin 300 mg 3 times a day.  He reports no improvement with the back pain.    He reports having a rash on his right left forearm.  He reports he has red spots that develop.  Denies itching.    Social History     Socioeconomic History   • Marital status:    Tobacco Use   • Smoking status: Never   • Smokeless tobacco: Never   Vaping Use   • Vaping Use: Never used   Substance and Sexual Activity   • Alcohol use: Never   • Drug use: Never   • Sexual activity: Never        Objective     Vitals:    04/11/23 1515   BP: 112/72   Pulse: 68   Temp: 96.4 °F (35.8 °C)   SpO2: 97%   Weight: 84.4 kg (186 lb)   Height: 168.9 cm (66.5\")        Body mass index is 29.57 kg/m².    Wt Readings from Last 3 Encounters:   04/11/23 84.4 kg (186 lb)   03/29/23 87.4 kg (192 lb 9.6 oz)   02/23/23 85.1 kg (187 lb 9.6 oz)       BP Readings from Last 3 Encounters:   04/11/23 112/72   03/29/23 130/87   02/23/23 128/76         Physical Exam  Vitals reviewed.   Constitutional:       Appearance: Normal appearance. He is well-developed.   HENT:      Head: Normocephalic and atraumatic.      Right Ear: External ear normal.      Left Ear: External ear normal.      Mouth/Throat:      Pharynx: No oropharyngeal exudate.   Eyes:      Conjunctiva/sclera: Conjunctivae normal.      Pupils: Pupils are equal, round, and reactive to light.   Cardiovascular:      Rate and Rhythm: Normal rate and regular rhythm.      Heart sounds: No murmur heard.    No friction rub. No gallop. "   Pulmonary:      Effort: Pulmonary effort is normal.      Breath sounds: Normal breath sounds. No wheezing or rhonchi.   Abdominal:      Palpations: Abdomen is soft.   Musculoskeletal:      Thoracic back: Tenderness present. Decreased range of motion.   Skin:     General: Skin is warm and dry.   Neurological:      Mental Status: He is alert and oriented to person, place, and time.   Psychiatric:         Mood and Affect: Affect normal.          Result Review :   The following data was reviewed by: RODY Herrera on 04/11/2023:      Procedures    Assessment and Plan   Diagnoses and all orders for this visit:    1. Acute bilateral thoracic back pain (Primary)  -     ketorolac (TORADOL) injection 60 mg  -     diclofenac (VOLTAREN) 75 MG EC tablet; Take 1 tablet by mouth 2 (Two) Times a Day.  Dispense: 30 tablet; Refill: 0  -     baclofen (LIORESAL) 10 MG tablet; Take 1 tablet by mouth 2 (Two) Times a Day.  Dispense: 30 tablet; Refill: 0    2. Petechiae      Provided a list of stretching exercising for the upper back pain.  We will give a Toradol 60 mg IM injection today.  Also prescribed Voltaren 75 mg twice daily as needed and baclofen 10 mg twice daily as needed for back pain.  Continue taking the tramadol and gabapentin as prescribed.  If pain persist will order an x-ray of the mid back.  Excuse him from work he may return back to work on Thursday.  He is not on any blood thinners at this time.  He has petechia spots on his right and left arm          Follow Up   No follow-ups on file.  Patient was given instructions and counseling regarding his condition or for health maintenance advice. Please see specific information pulled into the AVS if appropriate.     Please note that portions of this note were completed with a voice recognition program.

## 2023-05-10 ENCOUNTER — HOSPITAL ENCOUNTER (EMERGENCY)
Facility: HOSPITAL | Age: 63
Discharge: HOME | End: 2023-05-10
Attending: EMERGENCY MEDICINE | Admitting: EMERGENCY MEDICINE
Payer: OTHER GOVERNMENT

## 2023-05-10 VITALS
WEIGHT: 187.83 LBS | OXYGEN SATURATION: 99 % | HEART RATE: 66 BPM | TEMPERATURE: 98.3 F | DIASTOLIC BLOOD PRESSURE: 83 MMHG | BODY MASS INDEX: 30.19 KG/M2 | HEIGHT: 66 IN | SYSTOLIC BLOOD PRESSURE: 128 MMHG | RESPIRATION RATE: 18 BRPM

## 2023-05-10 DIAGNOSIS — R51.9 NONINTRACTABLE HEADACHE, UNSPECIFIED CHRONICITY PATTERN, UNSPECIFIED HEADACHE TYPE: Primary | ICD-10-CM

## 2023-05-10 LAB
BILIRUB UR QL STRIP: ABNORMAL
CLARITY UR: CLEAR
COLOR UR: ABNORMAL
GLUCOSE UR STRIP-MCNC: NEGATIVE MG/DL
HGB UR QL STRIP.AUTO: NEGATIVE
KETONES UR QL STRIP: NEGATIVE
LEUKOCYTE ESTERASE UR QL STRIP.AUTO: NEGATIVE
NITRITE UR QL STRIP: NEGATIVE
PH UR STRIP.AUTO: 5.5 [PH] (ref 5–8)
PROT UR QL STRIP: NEGATIVE
SP GR UR STRIP: 1.03 (ref 1–1.03)
UROBILINOGEN UR QL STRIP: ABNORMAL

## 2023-05-10 PROCEDURE — 96375 TX/PRO/DX INJ NEW DRUG ADDON: CPT

## 2023-05-10 PROCEDURE — 99283 EMERGENCY DEPT VISIT LOW MDM: CPT

## 2023-05-10 PROCEDURE — 81003 URINALYSIS AUTO W/O SCOPE: CPT | Performed by: EMERGENCY MEDICINE

## 2023-05-10 PROCEDURE — 96374 THER/PROPH/DIAG INJ IV PUSH: CPT

## 2023-05-10 PROCEDURE — 25010000002 DEXAMETHASONE SODIUM PHOSPHATE 10 MG/ML SOLUTION: Performed by: EMERGENCY MEDICINE

## 2023-05-10 PROCEDURE — 25010000002 PROCHLORPERAZINE 10 MG/2ML SOLUTION: Performed by: EMERGENCY MEDICINE

## 2023-05-10 PROCEDURE — 25010000002 KETOROLAC TROMETHAMINE PER 15 MG: Performed by: EMERGENCY MEDICINE

## 2023-05-10 PROCEDURE — 25010000002 DIPHENHYDRAMINE PER 50 MG: Performed by: EMERGENCY MEDICINE

## 2023-05-10 RX ORDER — PROCHLORPERAZINE EDISYLATE 5 MG/ML
10 INJECTION INTRAMUSCULAR; INTRAVENOUS ONCE
Status: COMPLETED | OUTPATIENT
Start: 2023-05-10 | End: 2023-05-10

## 2023-05-10 RX ORDER — KETOROLAC TROMETHAMINE 30 MG/ML
30 INJECTION, SOLUTION INTRAMUSCULAR; INTRAVENOUS ONCE
Status: COMPLETED | OUTPATIENT
Start: 2023-05-10 | End: 2023-05-10

## 2023-05-10 RX ORDER — DEXAMETHASONE SODIUM PHOSPHATE 10 MG/ML
8 INJECTION, SOLUTION INTRAMUSCULAR; INTRAVENOUS ONCE
Status: COMPLETED | OUTPATIENT
Start: 2023-05-10 | End: 2023-05-10

## 2023-05-10 RX ORDER — DIPHENHYDRAMINE HYDROCHLORIDE 50 MG/ML
25 INJECTION INTRAMUSCULAR; INTRAVENOUS ONCE
Status: COMPLETED | OUTPATIENT
Start: 2023-05-10 | End: 2023-05-10

## 2023-05-10 RX ADMIN — KETOROLAC TROMETHAMINE 30 MG: 30 INJECTION, SOLUTION INTRAMUSCULAR; INTRAVENOUS at 07:00

## 2023-05-10 RX ADMIN — DIPHENHYDRAMINE HYDROCHLORIDE 25 MG: 50 INJECTION, SOLUTION INTRAMUSCULAR; INTRAVENOUS at 07:00

## 2023-05-10 RX ADMIN — PROCHLORPERAZINE EDISYLATE 10 MG: 5 INJECTION INTRAMUSCULAR; INTRAVENOUS at 07:00

## 2023-05-10 RX ADMIN — DEXAMETHASONE SODIUM PHOSPHATE 8 MG: 10 INJECTION INTRAMUSCULAR; INTRAVENOUS at 06:59

## 2023-05-10 NOTE — ED TRIAGE NOTES
"Patient to ed from  with complaints of back aches, nausea, \"cluster HA\" and states that it is turning red around his foreskin   "

## 2023-05-10 NOTE — Clinical Note
[facility] EMERGENCY ROOM  [address]  [place] [address]  Phone: [phone]    Leandro Salcedo was seen and treated in our emergency department on 5/10/2023.  He may return to work on 05/11/2023.         Thank you for choosing [facility].    Ochoa Arambula MD

## 2023-05-10 NOTE — ED PROVIDER NOTES
Time: 6:59 AM EDT  Date of encounter:  5/10/2023  Independent Historian/Clinical History and Information was obtained by:   Patient  Chief Complaint: Headache, dysuria    History is limited by: N/A    History of Present Illness:  Patient is a 63 y.o. year old male who presents to the emergency department for evaluation of headache and dysuria.  Patient reports has a history of cluster headaches and complains of a headache for the past 16 hours.  He denies thunderclap onset, neck pain/stiffness, fever, nausea, vomiting, photophobia.  He also states he is uncircumcised and complains of redness of the foreskin and dysuria.    HPI    Patient Care Team  Primary Care Provider: Christofer Lagos DO    Past Medical History:     Allergies   Allergen Reactions   • Tigan [Trimethobenzamide] Anaphylaxis     Past Medical History:   Diagnosis Date   • Allergies    • Arthritis    • Condition not found     hernia   • De Quervain's fracture of right wrist 07/25/2017   • Headache, cluster    • High blood pressure    • High cholesterol    • Migraine    • Need for hepatitis C screening test 2017    neg per patient   • Need for shingles vaccine     done   • Primary osteoarthritis 07/25/2017    Right 1st [facility] primary osteoarthritis   • Prostate cancer screening 06/12/2019    PSA 0.43   • Renal calculus or stone      Past Surgical History:   Procedure Laterality Date   • ANKLE SURGERY     • COLONOSCOPY  2018    [facility]   • COLONOSCOPY N/A 04/18/2022    Procedure: COLONOSCOPY FOR SCREENING;  Surgeon: Jackie Watson MD;  Location: [facility] ENDOSCOPY;  Service: Gastroenterology;  Laterality: N/A;  HEMORRHOIDS   • EYE SURGERY     • INGUINAL HERNIA REPAIR Right 04/07/2015   • JOINT REPLACEMENT     • LIPOMA EXCISION     • SHOULDER SURGERY     • SPINAL FUSION     • TONSILLECTOMY     • TOTAL KNEE ARTHROPLASTY Left      Family History   Family history unknown: Yes       Home Medications:  Prior to Admission medications    Medication Sig Start  Date End Date Taking? Authorizing Provider   Rimegepant Sulfate (Nurtec) 75 MG tablet dispersible tablet Take 1 tablet by mouth Daily As Needed (migraine). 2/27/23  Yes Jolynn Moreno APRN   aspirin EC 81 MG EC tablet  4/12/22   Emergency, Nurse Ricky, RN   baclofen (LIORESAL) 10 MG tablet Take 1 tablet by mouth 2 (Two) Times a Day. 4/11/23   Evangelist Rosado APRN   clotrimazole-betamethasone (LOTRISONE) 1-0.05 % cream Apply 1 application topically to the appropriate area as directed 2 (Two) Times a Day. 5/16/22   Costa Larios MD   diclofenac (VOLTAREN) 75 MG EC tablet Take 1 tablet by mouth 2 (Two) Times a Day. 4/11/23   Evangelist Rosado APRN   ergocalciferol (ERGOCALCIFEROL) 1.25 MG (30389 UT) capsule ergocalciferol (vitamin D2) 1,250 mcg (50,000 unit) capsule    Emergency, Nurse Ricky, RN   gabapentin (NEURONTIN) 300 MG capsule 1 capsule 3 (Three) Times a Day. 10/26/21   Gil Spivey MD   hydroCHLOROthiazide (HYDRODIURIL) 25 MG tablet  10/26/21   Gil Spivey MD   lidocaine (XYLOCAINE) 5 % ointment  10/26/21   Gil Spivey MD   lisinopril (PRINIVIL,ZESTRIL) 40 MG tablet  5/19/21   Gil Spivey MD   promethazine (PHENERGAN) 25 MG tablet Take 1 tablet by mouth Every 6 (Six) Hours As Needed for Nausea or Vomiting. 3/29/23   Costa Larios MD   propranolol LA (INDERAL LA) 160 MG 24 hr capsule  5/19/21   Gil Spivey MD   simvastatin (ZOCOR) 20 MG tablet  10/26/21   Gil Spivey MD   traMADol (ULTRAM) 50 MG tablet  6/25/21   Gil Spivey MD        Social History:   Social History     Tobacco Use   • Smoking status: Never   • Smokeless tobacco: Never   Vaping Use   • Vaping Use: Never used   Substance Use Topics   • Alcohol use: Never   • Drug use: Never         Review of Systems:  Review of Systems   Constitutional: Negative for chills and fever.   HENT: Negative for congestion, rhinorrhea and sore throat.    Eyes: Negative for pain and visual  "disturbance.   Respiratory: Negative for apnea, cough, chest tightness and shortness of breath.    Cardiovascular: Negative for chest pain and palpitations.   Gastrointestinal: Negative for abdominal pain, diarrhea, nausea and vomiting.   Genitourinary: Positive for dysuria. Negative for difficulty urinating.   Musculoskeletal: Negative for joint swelling and myalgias.   Skin: Negative for color change.   Neurological: Positive for headaches. Negative for seizures.   Psychiatric/Behavioral: Negative.    All other systems reviewed and are negative.       Physical Exam:  /83   Pulse 66   Temp 98.3 °F (36.8 °C) (Oral)   Resp 18   Ht 167.6 cm (66\")   Wt 85.2 kg (187 lb 13.3 oz)   SpO2 99%   BMI 30.32 kg/m²     Physical Exam  Vitals and nursing note reviewed.   Constitutional:       General: He is not in acute distress.     Appearance: Normal appearance. He is not toxic-appearing.   HENT:      Head: Normocephalic and atraumatic.      Jaw: There is normal jaw occlusion.   Eyes:      General: Lids are normal.      Extraocular Movements: Extraocular movements intact.      Conjunctiva/sclera: Conjunctivae normal.      Pupils: Pupils are equal, round, and reactive to light.   Cardiovascular:      Rate and Rhythm: Normal rate and regular rhythm.      Pulses: Normal pulses.      Heart sounds: Normal heart sounds.   Pulmonary:      Effort: Pulmonary effort is normal. No respiratory distress.      Breath sounds: Normal breath sounds. No wheezing or rhonchi.   Abdominal:      General: Abdomen is flat.      Palpations: Abdomen is soft.      Tenderness: There is no abdominal tenderness. There is no guarding or rebound.   Genitourinary:     Penis: Normal.       Testes: Normal.      Comments: No signs of phimosis or paraphimosis  Musculoskeletal:         General: Normal range of motion.      Cervical back: Normal range of motion and neck supple.      Right lower leg: No edema.      Left lower leg: No edema.   Skin:     " General: Skin is warm and dry.   Neurological:      Mental Status: He is alert and oriented to person, place, and time. Mental status is at baseline.   Psychiatric:         Mood and Affect: Mood normal.                  Procedures:  Procedures      Medical Decision Making:      Comorbidities that affect care:    Hypertension    External Notes reviewed:    Previous Clinic Note: Urgent care visit for headache management      The following orders were placed and all results were independently analyzed by me:  Orders Placed This Encounter   Procedures   • Urinalysis With Culture If Indicated - Urine, Clean Catch       Medications Given in the Emergency Department:  Medications   ketorolac (TORADOL) injection 30 mg (30 mg Intravenous Given 5/10/23 0700)   prochlorperazine (COMPAZINE) injection 10 mg (10 mg Intravenous Given 5/10/23 0700)   diphenhydrAMINE (BENADRYL) injection 25 mg (25 mg Intravenous Given 5/10/23 0700)   dexamethasone sodium phosphate injection 8 mg (8 mg Intravenous Given 5/10/23 0659)        ED Course:         Labs:    Lab Results (last 24 hours)     Procedure Component Value Units Date/Time    Urinalysis With Culture If Indicated - Urine, Clean Catch [[id number]]  (Abnormal) Collected: 05/10/23 0731    Specimen: Urine, Clean Catch Updated: 05/10/23 0738     Color, UA Dark Yellow     Appearance, UA Clear     pH, UA 5.5     Specific Gravity, UA 1.029     Glucose, UA Negative     Ketones, UA Negative     Bilirubin, UA Small (1+)     Blood, UA Negative     Protein, UA Negative     Leuk Esterase, UA Negative     Nitrite, UA Negative     Urobilinogen, UA 1.0 E.U./dL    Narrative:      In absence of clinical symptoms, the presence of pyuria, bacteria, and/or nitrites on the urinalysis result does not correlate with infection.  Urine microscopic not indicated.           Imaging:    No Radiology Exams Resulted Within Past 24 Hours      Differential Diagnosis and Discussion:    Headache: Differential diagnosis  includes but is not limited to migraine, cluster headache, hypertension, tumor, subarachnoid bleeding, pseudotumor cerebri, temporal arteritis, infections, tension headache, and TMJ syndrome.    All labs were reviewed and interpreted by me.    MDM  Number of Diagnoses or Management Options  Nonintractable headache, unspecified chronicity pattern, unspecified headache type  Diagnosis management comments: In summary this is a 63-year-old male who presents to the emergency department for evaluation of headache and dysuria.  He has no focal neurologic deficits therefore CT scan of the brain is not indicated.  Patient has been given typical migraine headache cocktail medications with improvement of symptoms.  Urinalysis unremarkable and negative for infection.  He is otherwise well-appearing in no acute distress very strict return to ER and follow-up instructions have been provided to the patient.             Patient Care Considerations:    CT HEAD: I considered ordering a noncontrast CT of the head, however No focal neurologic deficit      Consultants/Shared Management Plan:    None    Social Determinants of Health:    Patient is independent, reliable, and has access to care.       Disposition and Care Coordination:    Discharged: I considered escalation of care by admitting this patient for observation, however the patient has improved and is suitable and  stable for discharge.    I have explained the patient´s condition, diagnoses and treatment plan based on the information available to me at this time. I have answered questions and addressed any concerns. The patient has a good  understanding of the patient´s diagnosis, condition, and treatment plan as can be expected at this point. The vital signs have been stable. The patient´s condition is stable and appropriate for discharge from the emergency department.      The patient will pursue further outpatient evaluation with the primary care physician or other  designated or consulting physician as outlined in the discharge instructions. They are agreeable to this plan of care and follow-up instructions have been explained in detail. The patient has received these instructions in written format and have expressed an understanding of the discharge instructions. The patient is aware that any significant change in condition or worsening of symptoms should prompt an immediate return to this or the closest emergency department or call to 911.  I have explained discharge medications and the need for follow up with the patient/caretakers. This was also printed in the discharge instructions. Patient was discharged with the following medications and follow up:      Medication List      No changes were made to your prescriptions during this visit.      Christofer Lagos, DO  [address] [address]  [phone]    In 1 week         Final diagnoses:   Nonintractable headache, unspecified chronicity pattern, unspecified headache type        ED Disposition     ED Disposition   Discharge    Condition   Stable    Comment   --             This medical record created using voice recognition software.           Ochoa Arambula MD  05/10/23 3871

## 2023-05-12 ENCOUNTER — TELEPHONE (OUTPATIENT)
Dept: FAMILY MEDICINE CLINIC | Facility: CLINIC | Age: 63
End: 2023-05-12
Payer: OTHER GOVERNMENT

## 2023-05-12 DIAGNOSIS — G89.29 CHRONIC RIGHT SHOULDER PAIN: ICD-10-CM

## 2023-05-12 DIAGNOSIS — M25.511 CHRONIC RIGHT SHOULDER PAIN: ICD-10-CM

## 2023-05-12 DIAGNOSIS — G56.01 CARPAL TUNNEL SYNDROME OF RIGHT WRIST: Primary | ICD-10-CM

## 2023-05-12 NOTE — TELEPHONE ENCOUNTER
Caller: Leandro Salcedo    Relationship: Self    Best call back number: [phone]    What is the medical concern/diagnosis: CARPAL TUNNEL, RIGHT AND LEFT WRIST, ARTHRITIS.     What specialty or service is being requested: Orthopedic    What is the provider, practice or medical service name: Farideh Rucker MD    What is the office location: [address] [place] [address]    What is the office phone number: [phone]     Any additional details:  PATIENT IS CALLING IN REQUESTING A UPDATE, REFERRAL FOR THE ABOVE PROVIDER PRIOR REFERRAL .

## 2023-05-22 ENCOUNTER — OFFICE VISIT (OUTPATIENT)
Dept: FAMILY MEDICINE CLINIC | Facility: CLINIC | Age: 63
End: 2023-05-22
Payer: OTHER GOVERNMENT

## 2023-05-22 VITALS
HEIGHT: 66 IN | OXYGEN SATURATION: 96 % | SYSTOLIC BLOOD PRESSURE: 118 MMHG | DIASTOLIC BLOOD PRESSURE: 70 MMHG | TEMPERATURE: 98.2 F | WEIGHT: 187.3 LBS | HEART RATE: 57 BPM | BODY MASS INDEX: 30.1 KG/M2

## 2023-05-22 DIAGNOSIS — G43.819 OTHER MIGRAINE WITHOUT STATUS MIGRAINOSUS, INTRACTABLE: ICD-10-CM

## 2023-05-22 DIAGNOSIS — G89.29 CHRONIC BILATERAL THORACIC BACK PAIN: Primary | ICD-10-CM

## 2023-05-22 DIAGNOSIS — R45.89 DEPRESSED MOOD: ICD-10-CM

## 2023-05-22 DIAGNOSIS — M54.6 CHRONIC BILATERAL THORACIC BACK PAIN: Primary | ICD-10-CM

## 2023-05-22 RX ORDER — METHYLPREDNISOLONE 4 MG/1
TABLET ORAL
Qty: 21 EACH | Refills: 0 | Status: SHIPPED | OUTPATIENT
Start: 2023-05-22

## 2023-05-22 RX ORDER — TIZANIDINE 4 MG/1
4 TABLET ORAL EVERY 8 HOURS PRN
Qty: 90 TABLET | Refills: 1 | Status: SHIPPED | OUTPATIENT
Start: 2023-05-22

## 2023-05-22 RX ORDER — TRIAMCINOLONE ACETONIDE 40 MG/ML
40 INJECTION, SUSPENSION INTRA-ARTICULAR; INTRAMUSCULAR ONCE
Status: COMPLETED | OUTPATIENT
Start: 2023-05-22 | End: 2023-05-22

## 2023-05-22 RX ORDER — KETOROLAC TROMETHAMINE 30 MG/ML
60 INJECTION, SOLUTION INTRAMUSCULAR; INTRAVENOUS ONCE
Status: COMPLETED | OUTPATIENT
Start: 2023-05-22 | End: 2023-05-22

## 2023-05-22 RX ADMIN — TRIAMCINOLONE ACETONIDE 40 MG: 40 INJECTION, SUSPENSION INTRA-ARTICULAR; INTRAMUSCULAR at 12:01

## 2023-05-22 RX ADMIN — KETOROLAC TROMETHAMINE 60 MG: 30 INJECTION, SOLUTION INTRAMUSCULAR; INTRAVENOUS at 12:01

## 2023-05-22 NOTE — PROGRESS NOTES
"Chief Complaint  midback pain    Subjective        Leandro Salcedo presents to [facility]  History of Present Illness  Patient presents today to discuss mid back pain.  It has been worse the past couple days but symptoms have been ongoing for the last couple months.  He was seen by RODY Anna on 4/11/2023.  At that time patient was prescribed Voltaren tablets as well as baclofen.  The baclofen did not help and the Voltaren helped for couple days.  He points to pain in the mid thoracic region between his shoulder blades.  Occasionally does radiate towards the right lateral chest area.  He denies any injury.  He is interested in further evaluation today given ongoing symptoms.  Depression screening is positive.  He denies any suicide ideations.  He admits to feeling down due to his ongoing back issues.  He is not interested in any medication or counseling at this time.  Should his symptoms worsen he is instructed to call or return.  He continues to see Jolynn fuentes for management of migraine headaches.  He did go to the emergency room for migraine headache on 5/10/2023.  This has since resolved.  Objective   Vital Signs:  /70 (BP Location: Right arm, Patient Position: Sitting)   Pulse 57   Temp 98.2 °F (36.8 °C) (Oral)   Ht 167.6 cm (66\")   Wt 85 kg (187 lb 4.8 oz)   SpO2 96%   BMI 30.23 kg/m²   Estimated body mass index is 30.23 kg/m² as calculated from the following:    Height as of this encounter: 167.6 cm (66\").    Weight as of this encounter: 85 kg (187 lb 4.8 oz).             Physical Exam   General: AAO ×3, no acute distress, pleasant  HEENT: Normocephalic, atraumatic  Cardiovascular: Regular rate and rhythm without appreciable murmur  Respiratory: Clear to auscultation bilaterally no RRW  Gastrointestinal: Soft nontender nondistended with bowel sounds present  extremities: No edema  Neurologic: CN II through XII grossly intact   Psychiatric: Normal mood and " affect  Result Review :                   Assessment and Plan   Diagnoses and all orders for this visit:    1. Chronic bilateral thoracic back pain (Primary)  -     XR Spine Thoracic 3 View; Future  -     MRI Thoracic Spine Without Contrast; Future  -     ketorolac (TORADOL) injection 60 mg  -     triamcinolone acetonide (KENALOG-40) injection 40 mg    2. Other migraine without status migrainosus, intractable    3. Depressed mood    Other orders  -     tiZANidine (ZANAFLEX) 4 MG tablet; Take 1 tablet by mouth Every 8 (Eight) Hours As Needed for Muscle Spasms.  Dispense: 90 tablet; Refill: 1  -     methylPREDNISolone (MEDROL) 4 MG dose pack; Take as directed on package instructions.  Dispense: 21 each; Refill: 0    Plan as documented above.  I discussed with patient obtaining a x-ray of the thoracic spine as well as an MRI of the thoracic spine.  Toradol 60 mg as well as Kenalog 40 mg have been provided today as injection.  I will place him on Zanaflex 4 mg to take every 8 hours as needed.  He is instructed to avoid operating heavy machinery or equipment while taking tizanidine due to drowsiness side effect.  A Medrol Dosepak is also been prescribed today.  I plan to see him back in 1 month or sooner if needed.         Follow Up   Return in about 1 month (around 6/22/2023) for thoracic back pain.  Patient was given instructions and counseling regarding his condition or for health maintenance advice. Please see specific information pulled into the AVS if appropriate.

## 2023-05-25 ENCOUNTER — HOSPITAL ENCOUNTER (OUTPATIENT)
Dept: GENERAL RADIOLOGY | Facility: HOSPITAL | Age: 63
Discharge: HOME | End: 2023-05-25
Payer: OTHER GOVERNMENT

## 2023-05-25 DIAGNOSIS — G89.29 CHRONIC BILATERAL THORACIC BACK PAIN: ICD-10-CM

## 2023-05-25 DIAGNOSIS — M54.6 CHRONIC BILATERAL THORACIC BACK PAIN: ICD-10-CM

## 2023-05-25 PROCEDURE — 72072 X-RAY EXAM THORAC SPINE 3VWS: CPT

## 2023-05-27 ENCOUNTER — HOSPITAL ENCOUNTER (EMERGENCY)
Facility: HOSPITAL | Age: 63
Discharge: HOME | End: 2023-05-27
Attending: EMERGENCY MEDICINE
Payer: OTHER GOVERNMENT

## 2023-05-27 VITALS
BODY MASS INDEX: 30.36 KG/M2 | OXYGEN SATURATION: 96 % | WEIGHT: 188.93 LBS | HEART RATE: 58 BPM | DIASTOLIC BLOOD PRESSURE: 85 MMHG | HEIGHT: 66 IN | TEMPERATURE: 98.8 F | SYSTOLIC BLOOD PRESSURE: 145 MMHG | RESPIRATION RATE: 16 BRPM

## 2023-05-27 DIAGNOSIS — K04.7 DENTAL ABSCESS: Primary | ICD-10-CM

## 2023-05-27 DIAGNOSIS — K08.89 PAIN, DENTAL: ICD-10-CM

## 2023-05-27 PROCEDURE — 99282 EMERGENCY DEPT VISIT SF MDM: CPT

## 2023-05-27 RX ORDER — IBUPROFEN 800 MG/1
800 TABLET ORAL EVERY 8 HOURS PRN
Qty: 20 TABLET | Refills: 0 | Status: SHIPPED | OUTPATIENT
Start: 2023-05-27

## 2023-05-27 RX ORDER — HYDROCODONE BITARTRATE AND ACETAMINOPHEN 5; 325 MG/1; MG/1
1 TABLET ORAL EVERY 6 HOURS PRN
Qty: 10 TABLET | Refills: 0 | Status: SHIPPED | OUTPATIENT
Start: 2023-05-27

## 2023-05-27 RX ORDER — AMOXICILLIN 875 MG/1
875 TABLET, COATED ORAL 2 TIMES DAILY
Qty: 14 TABLET | Refills: 0 | Status: SHIPPED | OUTPATIENT
Start: 2023-05-27 | End: 2023-06-03

## 2023-05-27 NOTE — ED PROVIDER NOTES
Time: 9:58 AM EDT  Date of encounter:  5/27/2023  Independent Historian/Clinical History and Information was obtained by:   Patient  Chief Complaint: Left-sided dental pain    History is limited by: N/A    History of Present Illness:  Patient is a 63 y.o. year old male who presents to the emergency department for evaluation of severe 9 out of 10 left-sided maxillary molar dental pain.  No reported injury.    He was unable to get in with his dentist but did leave a voicemail for urgent follow-up appointment.  He has tried taking some Anbesol and applying to the tooth but is not helping his pain, and neither is Tylenol.    He also has some swelling of his gum and jaw.    He does report some low-grade fevers today but already took Tylenol prior to arrival.        HPI    Patient Care Team  Primary Care Provider: Christofer Lagos DO    Past Medical History:     Allergies   Allergen Reactions   • Tigan [Trimethobenzamide] Anaphylaxis     Past Medical History:   Diagnosis Date   • Allergies    • Arthritis    • Condition not found     hernia   • De Quervain's fracture of right wrist 07/25/2017   • Headache, cluster    • High blood pressure    • High cholesterol    • Migraine    • Need for hepatitis C screening test 2017    neg per patient   • Need for shingles vaccine     done   • Primary osteoarthritis 07/25/2017    Right 1st [facility] primary osteoarthritis   • Prostate cancer screening 06/12/2019    PSA 0.43   • Renal calculus or stone      Past Surgical History:   Procedure Laterality Date   • ANKLE SURGERY     • COLONOSCOPY  2018    [facility]   • COLONOSCOPY N/A 04/18/2022    Procedure: COLONOSCOPY FOR SCREENING;  Surgeon: Jackie Watson MD;  Location: [facility] ENDOSCOPY;  Service: Gastroenterology;  Laterality: N/A;  HEMORRHOIDS   • EYE SURGERY     • INGUINAL HERNIA REPAIR Right 04/07/2015   • JOINT REPLACEMENT     • LIPOMA EXCISION     • SHOULDER SURGERY     • SPINAL FUSION     • TONSILLECTOMY     • TOTAL KNEE  ARTHROPLASTY Left      Family History   Family history unknown: Yes       Home Medications:  Prior to Admission medications    Medication Sig Start Date End Date Taking? Authorizing Provider   aspirin EC 81 MG EC tablet  4/12/22   Emergency, Nurse Ricky, RN   clotrimazole-betamethasone (LOTRISONE) 1-0.05 % cream Apply 1 application topically to the appropriate area as directed 2 (Two) Times a Day. 5/16/22   Costa Larios MD   diclofenac (VOLTAREN) 75 MG EC tablet Take 1 tablet by mouth 2 (Two) Times a Day. 4/11/23   Evangelist Rosado APRN   ergocalciferol (ERGOCALCIFEROL) 1.25 MG (80883 UT) capsule ergocalciferol (vitamin D2) 1,250 mcg (50,000 unit) capsule    Emergency, Nurse Ricky, RN   gabapentin (NEURONTIN) 300 MG capsule 1 capsule 3 (Three) Times a Day. 10/26/21   Gil Spivey MD   hydroCHLOROthiazide (HYDRODIURIL) 25 MG tablet  10/26/21   Gil Spivey MD   lidocaine (XYLOCAINE) 5 % ointment  10/26/21   Gil Spivey MD   lisinopril (PRINIVIL,ZESTRIL) 40 MG tablet  5/19/21   Gil Spivey MD   methylPREDNISolone (MEDROL) 4 MG dose pack Take as directed on package instructions. 5/22/23   Christofer Lagos DO   promethazine (PHENERGAN) 25 MG tablet Take 1 tablet by mouth Every 6 (Six) Hours As Needed for Nausea or Vomiting. 3/29/23   Costa Larios MD   propranolol LA (INDERAL LA) 160 MG 24 hr capsule  5/19/21   Gil Spivey MD   Rimegepant Sulfate (Nurtec) 75 MG tablet dispersible tablet Take 1 tablet by mouth Daily As Needed (migraine). 2/27/23   Jolynn Moreno APRN   simvastatin (ZOCOR) 20 MG tablet  10/26/21   Gil Spivey MD   tiZANidine (ZANAFLEX) 4 MG tablet Take 1 tablet by mouth Every 8 (Eight) Hours As Needed for Muscle Spasms. 5/22/23   Christofer Lagos DO   traMADol (ULTRAM) 50 MG tablet  6/25/21   Gil Spivey MD        Social History:   Social History     Tobacco Use   • Smoking status: Never   • Smokeless tobacco: Never   Vaping  "Use   • Vaping Use: Never used   Substance Use Topics   • Alcohol use: Never   • Drug use: Never         Review of Systems:  Review of Systems   I performed a 10 point review of systems which was all negative, except for the positives found in the HPI above.  Physical Exam:  /85 (BP Location: Left arm, Patient Position: Sitting)   Pulse 58   Temp 98.8 °F (37.1 °C) (Oral)   Resp 16   Ht 167.6 cm (66\")   Wt 85.7 kg (188 lb 15 oz)   SpO2 96%   BMI 30.49 kg/m²     Physical Exam   General: Awake alert and in no obvious distress    HEENT: Head normocephalic atraumatic, eyes PERRLA EOMI, nose normal, oropharynx normal.  He does have dental tenderness in the left maxillary molar tooth #15 and 14 are tender but there is no fluctuance or crepitus or fullness in the floor of the mouth or lymphadenopathy.    Neck: Supple full range of motion, no meningismus, no lymphadenopathy    Heart: Regular rate and rhythm, no murmurs or rubs, 2+ radial pulses bilaterally    Lungs: Clear to auscultation bilaterally without wheezes or crackles, no respiratory distress    Abdomen: Soft, nontender, nondistended, no rebound or guarding    Skin: Warm, dry, no rash    Musculoskeletal: Normal range of motion, no lower extremity edema    Neurologic: Oriented x3, no motor deficits no sensory deficits    Psychiatric: Mood appears stable, no psychosis          Procedures:  Procedures      Medical Decision Making:      Comorbidities that affect care:    Hypertension    External Notes reviewed:    None      The following orders were placed and all results were independently analyzed by me:  No orders of the defined types were placed in this encounter.      Medications Given in the Emergency Department:  Medications - No data to display     ED Course:         Labs:    Lab Results (last 24 hours)     ** No results found for the last 24 hours. **           Imaging:    No Radiology Exams Resulted Within Past 24 Hours      Differential Diagnosis " and Discussion:    Dental Pain: Differential diagnosis includes but is not limited to dental caries, periodontitis, pericoronitis, peridental abscess, gingival abscess, apthous stomatitis, allergic stomatitis, acute necrotizing ulcerative gingivitis, herpetic stomatitis.        MDM         This patient is a 63-year-old male with left-sided dental pain and dental abscess.    I will start him on some NSAIDs and antibiotics and a bit of pain medicine until he can follow-up with his dentist.    I do not see any signs of severe cellulitis of the face or any fluctuance or crepitus or any signs of Iker's angina.    He looks stable for urgent follow-up with his dentist and I will give him return precautions for any worsening symptoms.          Patient Care Considerations:          Consultants/Shared Management Plan:        Social Determinants of Health:    Patient is independent, reliable, and has access to care.       Disposition and Care Coordination:    Discharged: The patient is suitable and stable for discharge with no need for consideration of observation or admission.    I have explained the patient´s condition, diagnoses and treatment plan based on the information available to me at this time. I have answered questions and addressed any concerns. The patient has a good  understanding of the patient´s diagnosis, condition, and treatment plan as can be expected at this point. The vital signs have been stable. The patient´s condition is stable and appropriate for discharge from the emergency department.      The patient will pursue further outpatient evaluation with the primary care physician or other designated or consulting physician as outlined in the discharge instructions. They are agreeable to this plan of care and follow-up instructions have been explained in detail. The patient has received these instructions in written format and have expressed an understanding of the discharge instructions. The patient is  aware that any significant change in condition or worsening of symptoms should prompt an immediate return to this or the closest emergency department or call to 911.  I have explained discharge medications and the need for follow up with the patient/caretakers. This was also printed in the discharge instructions. Patient was discharged with the following medications and follow up:      Medication List      New Prescriptions    amoxicillin 875 MG tablet  Commonly known as: AMOXIL  Take 1 tablet by mouth 2 (Two) Times a Day for 7 days.     HYDROcodone-acetaminophen 5-325 MG per tablet  Commonly known as: NORCO  Take 1 tablet by mouth Every 6 (Six) Hours As Needed for Severe Pain.     ibuprofen 800 MG tablet  Commonly known as: ADVIL,MOTRIN  Take 1 tablet by mouth Every 8 (Eight) Hours As Needed for Moderate Pain, Headache or Fever.           Where to Get Your Medications      These medications were sent to [id number] - [place], [place] [id number] AT [facility] OF [address] & [place] - [phone] [facility] [phone]   [address] [place] [address]    Phone: [phone]   · amoxicillin 875 MG tablet  · HYDROcodone-acetaminophen 5-325 MG per tablet  · ibuprofen 800 MG tablet      Call your dentist for a follow-up appointment this week             Final diagnoses:   Pain, dental   Dental abscess        ED Disposition     ED Disposition   Discharge    Condition   Stable    Comment   --             This medical record created using voice recognition software.           Paulie Schroeder MD  05/27/23 0523

## 2023-05-27 NOTE — Clinical Note
[facility] EMERGENCY ROOM  [address]  [place] [address]  Phone: [phone]    Leandro Salcedo was seen and treated in our emergency department on 5/27/2023.  He may return to work on 05/29/2023.         Thank you for choosing [facility].    Paulie Schroeder MD

## 2023-05-27 NOTE — DISCHARGE INSTRUCTIONS
It looks like you probably have some dental infection in your left maxillary molar (tooth #14 and 15), for which you definitely need to get in with your dentist this week.    For now you can take the high-dose ibuprofen to help with pain and inflammation, and start the amoxicillin antibiotics to treat for any infection.    Only use the hydrocodone for severe pain if you absolutely need to.

## 2023-06-06 ENCOUNTER — OFFICE VISIT (OUTPATIENT)
Dept: ORTHOPEDIC SURGERY | Facility: CLINIC | Age: 63
End: 2023-06-06
Payer: OTHER GOVERNMENT

## 2023-06-06 VITALS
OXYGEN SATURATION: 97 % | WEIGHT: 188 LBS | HEIGHT: 66 IN | BODY MASS INDEX: 30.22 KG/M2 | HEART RATE: 54 BPM | DIASTOLIC BLOOD PRESSURE: 84 MMHG | SYSTOLIC BLOOD PRESSURE: 142 MMHG

## 2023-06-06 DIAGNOSIS — G56.01 CARPAL TUNNEL SYNDROME OF RIGHT WRIST: Primary | ICD-10-CM

## 2023-06-06 DIAGNOSIS — M65.4 DE QUERVAIN'S TENOSYNOVITIS, RIGHT: ICD-10-CM

## 2023-06-06 DIAGNOSIS — M25.511 RIGHT SHOULDER PAIN, UNSPECIFIED CHRONICITY: ICD-10-CM

## 2023-06-06 RX ORDER — LIDOCAINE HYDROCHLORIDE 10 MG/ML
1 INJECTION, SOLUTION EPIDURAL; INFILTRATION; INTRACAUDAL; PERINEURAL
Status: COMPLETED | OUTPATIENT
Start: 2023-06-06 | End: 2023-06-06

## 2023-06-06 RX ORDER — TRIAMCINOLONE ACETONIDE 40 MG/ML
40 INJECTION, SUSPENSION INTRA-ARTICULAR; INTRAMUSCULAR
Status: COMPLETED | OUTPATIENT
Start: 2023-06-06 | End: 2023-06-06

## 2023-06-06 RX ORDER — LIDOCAINE HYDROCHLORIDE 10 MG/ML
5 INJECTION, SOLUTION INFILTRATION; PERINEURAL
Status: COMPLETED | OUTPATIENT
Start: 2023-06-06 | End: 2023-06-06

## 2023-06-06 RX ADMIN — LIDOCAINE HYDROCHLORIDE 1 ML: 10 INJECTION, SOLUTION EPIDURAL; INFILTRATION; INTRACAUDAL; PERINEURAL at 08:17

## 2023-06-06 RX ADMIN — TRIAMCINOLONE ACETONIDE 40 MG: 40 INJECTION, SUSPENSION INTRA-ARTICULAR; INTRAMUSCULAR at 08:17

## 2023-06-06 RX ADMIN — TRIAMCINOLONE ACETONIDE 40 MG: 40 INJECTION, SUSPENSION INTRA-ARTICULAR; INTRAMUSCULAR at 08:13

## 2023-06-06 RX ADMIN — LIDOCAINE HYDROCHLORIDE 5 ML: 10 INJECTION, SOLUTION INFILTRATION; PERINEURAL at 08:13

## 2023-06-06 NOTE — PROGRESS NOTES
"Chief Complaint  Pain and Follow-up of the Right Wrist and Pain and Follow-up of the Right Shoulder     Subjective      Leandro Salcedo presents to [facility] for follow up of the right shoulder and the right wrist. He has chronic pain in the right shoulder that he treats conservatively.  He has had injections that give some relief.    He also has a history of De quervein's and carpal tunnel of the right wrist that he also treats conservatively.  His last injections were 2/15/22. 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  He has no recent injury of the shoulder or wrist.     Allergies   Allergen Reactions    Tigan [Trimethobenzamide] Anaphylaxis        Social History     Socioeconomic History    Marital status:    Tobacco Use    Smoking status: Never    Smokeless tobacco: Never   Vaping Use    Vaping Use: Never used   Substance and Sexual Activity    Alcohol use: Never    Drug use: Never    Sexual activity: Never        Review of Systems     Objective   Vital Signs:   /84   Pulse 54   Ht 167.6 cm (66\")   Wt 85.3 kg (188 lb)   SpO2 97%   BMI 30.34 kg/m²       Physical Exam  Constitutional:       Appearance: Normal appearance. Patient is well-developed and normal weight.   HENT:      Head: Normocephalic.      Right Ear: Hearing and external ear normal.      Left Ear: Hearing and external ear normal.      Nose: Nose normal.   Eyes:      Conjunctiva/sclera: Conjunctivae normal.   Cardiovascular:      Rate and Rhythm: Normal rate.   Pulmonary:      Effort: Pulmonary effort is normal.      Breath sounds: No wheezing or rales.   Abdominal:      Palpations: Abdomen is soft.      Tenderness: There is no abdominal tenderness.   Musculoskeletal:      Cervical back: Normal range of motion.   Skin:     Findings: No rash.   Neurological:      Mental Status: Patient is alert and oriented to person, place, and time.   Psychiatric:         Mood and Affect: Mood and affect normal.         " Judgment: Judgment normal.       Ortho Exam      RIGHT SHOULDER Forward flexion 160 Abduction 160. External rotation 60. Internal rotation L5. Positive Cross body adduction. Supraspinatus strength 5/5. Infraspinatus Strength 5/5. Infrared subscap 5/5. Positive Torres. Positive Neer. Negative Apprehension. Negative Lift off. (Negative Obriens. Sensation intact to light touch, median, radial, ulnar nerve. Positive AIN, PIN, ulnar nerve motor. Positive pulses. Positive Impingement signs. Good strength in triceps, biceps, deltoid, wrist extensors and wrist flexors.    RIGHT WRIST Negative Compression testing/ Positive Tinels. NegativeFinkelsteins. Negative Jaimes's testing. Negative [facility] grind testing. Positive Phalens. Full ROM of the hand, fingers, elbow and wrist. Negative Triggering of the digit. Sensation grossly intact to light touch, median, radial and ulnar nerve. Positive AIN, PIN and ulnar nerve motor function intact. Axillary nerve intact. Positive pulses.         Large Joint Arthrocentesis  Date/Time: 6/6/2023 8:13 AM  Consent given by: patient  Site marked: site marked  Timeout: Immediately prior to procedure a time out was called to verify the correct patient, procedure, equipment, support staff and site/side marked as required   Supporting Documentation  Indications: pain   Procedure Details  Location: shoulder (right) -   Needle gauge: 21g.  Medications administered: 40 mg triamcinolone acetonide 40 MG/ML; 5 mL lidocaine 1 %  Patient tolerance: patient tolerated the procedure well with no immediate complications      Medium Joint Arthrocentesis  Date/Time: 6/6/2023 8:17 AM  Consent given by: patient  Site marked: site marked  Timeout: Immediately prior to procedure a time out was called to verify the correct patient, procedure, equipment, support staff and site/side marked as required   Supporting Documentation  Indications: pain   Procedure Details  Location: wrist -   Needle size: 23 G  Medications  administered: 40 mg triamcinolone acetonide 40 MG/ML; 1 mL lidocaine PF 1% 1 %  Patient tolerance: patient tolerated the procedure well with no immediate complications        Imaging Results (Most Recent)       None             Result Review :              Assessment and Plan     Diagnoses and all orders for this visit:    1. Carpal tunnel syndrome of right wrist (Primary)    2. De Quervain's tenosynovitis, right    3. Right shoulder pain, unspecified chronicity        Discussed the treatment plan with the patient. Discussed the risks and benefits of conservative measures.  The patient expressed understanding and wished to proceed with a right shoulder and dequerveins injections.  He tolerated the injections well.       Call or return if worsening symptoms.    Follow Up     PRN    Patient was given instructions and counseling regarding his condition or for health maintenance advice. Please see specific information pulled into the AVS if appropriate.     Scribed for Farideh Rucker MD by Nicolette Ascencio MA.  06/06/23   08:05 EDT    I have personally performed the services described in this document as scribed by the above individual and it is both accurate and complete. Farideh Rucker MD 06/06/23

## 2023-06-07 DIAGNOSIS — M54.6 CHRONIC BILATERAL THORACIC BACK PAIN: Primary | ICD-10-CM

## 2023-06-07 DIAGNOSIS — G89.29 CHRONIC BILATERAL THORACIC BACK PAIN: Primary | ICD-10-CM

## 2023-06-16 ENCOUNTER — PROCEDURE VISIT (OUTPATIENT)
Dept: NEUROLOGY | Facility: CLINIC | Age: 63
End: 2023-06-16
Payer: OTHER GOVERNMENT

## 2023-06-16 DIAGNOSIS — G43.719 INTRACTABLE CHRONIC MIGRAINE WITHOUT AURA AND WITHOUT STATUS MIGRAINOSUS: Primary | ICD-10-CM

## 2023-08-25 ENCOUNTER — TELEPHONE (OUTPATIENT)
Dept: NEUROLOGY | Facility: CLINIC | Age: 63
End: 2023-08-25

## 2023-08-25 NOTE — TELEPHONE ENCOUNTER
DELETE AFTER REVIEWING: Telephone encounter to be sent to the non-clinical pool.    The [facility] received a fax that requires your attention. The document has been indexed to the patient’s chart for your review.      Reason for sending: VA AUTH RENEWAL NOTIFICATION LTR    Documents Description: VA AUTH RENEWAL [NEURO]_[facility]_8.23.23    Name of Sender: LEANA GOINS [facility] OF [facility]    Date Indexed: 08/25/2023    Notes (if needed): AUTH# [id number]

## 2023-08-30 ENCOUNTER — TELEPHONE (OUTPATIENT)
Dept: NEUROLOGY | Facility: CLINIC | Age: 63
End: 2023-08-30

## 2023-08-30 NOTE — TELEPHONE ENCOUNTER
The [facility] received a fax that requires your attention. The document has been indexed to the patient’s chart for your review.    Reason for sending: MEDICAL RECORDS NOTIFICATION    Documents Description: NEUROLOGY REFERRAL_[facility]_08/25/23    Name of Sender: [facility]    Date Indexed: 08/30/23    Notes (if needed): AUTH #[id number] FOR CONTINUING CARE

## 2023-09-08 ENCOUNTER — PROCEDURE VISIT (OUTPATIENT)
Dept: NEUROLOGY | Facility: CLINIC | Age: 63
End: 2023-09-08
Payer: OTHER GOVERNMENT

## 2023-09-08 DIAGNOSIS — G43.719 INTRACTABLE CHRONIC MIGRAINE WITHOUT AURA AND WITHOUT STATUS MIGRAINOSUS: Primary | ICD-10-CM

## 2023-09-08 RX ORDER — RIMEGEPANT SULFATE 75 MG/75MG
75 TABLET, ORALLY DISINTEGRATING ORAL DAILY PRN
Qty: 8 TABLET | Refills: 5 | Status: SHIPPED | OUTPATIENT
Start: 2023-09-08

## 2023-09-08 NOTE — PROGRESS NOTES
CC: Botox Injections  Indication for Procedure: Chronic migraines          There were no vitals taken for this visit.     With written consent obtained and risks and benefits explained to patient.     Botox injected using FDA approved protocol for chronic migraine prevention.   10 units, Procerus 5 units, Frontalis 20 units, Temporalis 40 units, Occipitalis 30 units, Cervical Paraspinals 20 units, Trapezius 30 units.     The total amount injected in units is 155.  The total amount wasted in units is 45.  The total amount submitted in units is 200.  Botox was supplied by physician.    Patient tolerated procedure well with no immediate complications.     We have discussed risk and benefits of this Botox procedure and common side effects including headache, neck pain, neck stiffness or weakness, ptosis, flu-like symptoms as well as more serious possible adverse effects including possible dysphagia, respiratory distress or even death (death has only been reported once with adults for Botox for migraines in another state when mixed with lidocaine solution which we do not use lidocaine solution in our practice for mixing Botox). Verbalizes understanding, accepts risks and agrees with moving forward with Botox injections for chronic migraine prevention..

## 2023-10-11 NOTE — PATIENT INSTRUCTIONS
Migraine Headache  A migraine headache is an intense, throbbing pain on one side or both sides of the head. Migraine headaches may also cause other symptoms, such as nausea, vomiting, and sensitivity to light and noise. A migraine headache can last from 4 hours to 3 days. Talk with your doctor about what things may bring on (trigger) your migraine headaches.  What are the causes?  The exact cause of this condition is not known. However, a migraine may be caused when nerves in the brain become irritated and release chemicals that cause inflammation of blood vessels. This inflammation causes pain. This condition may be triggered or caused by:  Drinking alcohol.  Smoking.  Taking medicines, such as:  Medicine used to treat chest pain (nitroglycerin).  Birth control pills.  Estrogen.  Certain blood pressure medicines.  Eating or drinking products that contain nitrates, glutamate, aspartame, or tyramine. Aged cheeses, chocolate, or caffeine may also be triggers.  Doing physical activity.  Other things that may trigger a migraine headache include:  Menstruation.  Pregnancy.  Hunger.  Stress.  Lack of sleep or too much sleep.  Weather changes.  Fatigue.  What increases the risk?  The following factors may make you more likely to experience migraine headaches:  Being a certain age. This condition is more common in people who are 25-55 years old.  Being female.  Having a family history of migraine headaches.  Being .  Having a mental health condition, such as depression or anxiety.  Being obese.  What are the signs or symptoms?  The main symptom of this condition is pulsating or throbbing pain. This pain may:  Happen in any area of the head, such as on one side or both sides.  Interfere with daily activities.  Get worse with physical activity.  Get worse with exposure to bright lights or loud noises.  Other symptoms may include:  Nausea.  Vomiting.  Dizziness.  General sensitivity to bright lights, loud noises, or  smells.  Before you get a migraine headache, you may get warning signs (an aura). An aura may include:  Seeing flashing lights or having blind spots.  Seeing bright spots, halos, or zigzag lines.  Having tunnel vision or blurred vision.  Having numbness or a tingling feeling.  Having trouble talking.  Having muscle weakness.  Some people have symptoms after a migraine headache (postdromal phase), such as:  Feeling tired.  Difficulty concentrating.  How is this diagnosed?  A migraine headache can be diagnosed based on:  Your symptoms.  A physical exam.  Tests, such as:  CT scan or an MRI of the head. These imaging tests can help rule out other causes of headaches.  Taking fluid from the spine (lumbar puncture) and analyzing it (cerebrospinal fluid analysis, or CSF analysis).  How is this treated?  This condition may be treated with medicines that:  Relieve pain.  Relieve nausea.  Prevent migraine headaches.  Treatment for this condition may also include:  Acupuncture.  Lifestyle changes like avoiding foods that trigger migraine headaches.  Biofeedback.  Cognitive behavioral therapy.  Follow these instructions at home:  Medicines  Take over-the-counter and prescription medicines only as told by your health care provider.  Ask your health care provider if the medicine prescribed to you:  Requires you to avoid driving or using heavy machinery.  Can cause constipation. You may need to take these actions to prevent or treat constipation:  Drink enough fluid to keep your urine pale yellow.  Take over-the-counter or prescription medicines.  Eat foods that are high in fiber, such as beans, whole grains, and fresh fruits and vegetables.  Limit foods that are high in fat and processed sugars, such as fried or sweet foods.  Lifestyle  Do not drink alcohol.  Do not use any products that contain nicotine or tobacco, such as cigarettes, e-cigarettes, and chewing tobacco. If you need help quitting, ask your health care  provider.  Get at least 8 hours of sleep every night.  Find ways to manage stress, such as meditation, deep breathing, or yoga.  General instructions     Keep a journal to find out what may trigger your migraine headaches. For example, write down:  What you eat and drink.  How much sleep you get.  Any change to your diet or medicines.  If you have a migraine headache:  Avoid things that make your symptoms worse, such as bright lights.  It may help to lie down in a dark, quiet room.  Do not drive or use heavy machinery.  Ask your health care provider what activities are safe for you while you are experiencing symptoms.  Keep all follow-up visits as told by your health care provider. This is important.  Contact a health care provider if:  You develop symptoms that are different or more severe than your usual migraine headache symptoms.  You have more than 15 headache days in one month.  Get help right away if:  Your migraine headache becomes severe.  Your migraine headache lasts longer than 72 hours.  You have a fever.  You have a stiff neck.  You have vision loss.  Your muscles feel weak or like you cannot control them.  You start to lose your balance often.  You have trouble walking.  You faint.  You have a seizure.  Summary  A migraine headache is an intense, throbbing pain on one side or both sides of the head. Migraines may also cause other symptoms, such as nausea, vomiting, and sensitivity to light and noise.  This condition may be treated with medicines and lifestyle changes. You may also need to avoid certain things that trigger a migraine headache.  Keep a journal to find out what may trigger your migraine headaches.  Contact your health care provider if you have more than 15 headache days in a month or you develop symptoms that are different or more severe than your usual migraine headache symptoms.  This information is not intended to replace advice given to you by your health care provider. Make sure you  discuss any questions you have with your health care provider.  Document Revised: 04/10/2020 Document Reviewed: 01/30/2020  Elsevier Patient Education © 2022 [employer]     Olumiant Counseling: I discussed with the patient the risks of Olumiant therapy including but not limited to upper respiratory tract infections, shingles, cold sores, and nausea. Live vaccines should be avoided.  This medication has been linked to serious infections; higher rate of mortality; malignancy and lymphoproliferative disorders; major adverse cardiovascular events; thrombosis; gastrointestinal perforations; neutropenia; lymphopenia; anemia; liver enzyme elevations; and lipid elevations.

## 2023-11-29 ENCOUNTER — OFFICE VISIT (OUTPATIENT)
Dept: FAMILY MEDICINE CLINIC | Facility: CLINIC | Age: 63
End: 2023-11-29
Payer: OTHER GOVERNMENT

## 2023-11-29 VITALS
DIASTOLIC BLOOD PRESSURE: 88 MMHG | HEIGHT: 66 IN | TEMPERATURE: 98.3 F | BODY MASS INDEX: 29.73 KG/M2 | OXYGEN SATURATION: 99 % | WEIGHT: 185 LBS | HEART RATE: 54 BPM | SYSTOLIC BLOOD PRESSURE: 136 MMHG

## 2023-11-29 DIAGNOSIS — G43.809 OTHER MIGRAINE WITHOUT STATUS MIGRAINOSUS, NOT INTRACTABLE: ICD-10-CM

## 2023-11-29 DIAGNOSIS — M41.9 SCOLIOSIS OF THORACOLUMBAR SPINE, UNSPECIFIED SCOLIOSIS TYPE: ICD-10-CM

## 2023-11-29 DIAGNOSIS — M51.36 DDD (DEGENERATIVE DISC DISEASE), LUMBAR: ICD-10-CM

## 2023-11-29 DIAGNOSIS — Z23 NEED FOR TDAP VACCINATION: ICD-10-CM

## 2023-11-29 DIAGNOSIS — R73.09 ABNORMAL GLUCOSE: ICD-10-CM

## 2023-11-29 DIAGNOSIS — M54.6 CHRONIC BILATERAL THORACIC BACK PAIN: Primary | ICD-10-CM

## 2023-11-29 DIAGNOSIS — Z12.5 SCREENING FOR PROSTATE CANCER: ICD-10-CM

## 2023-11-29 DIAGNOSIS — Z23 NEED FOR INFLUENZA VACCINATION: ICD-10-CM

## 2023-11-29 DIAGNOSIS — I10 PRIMARY HYPERTENSION: ICD-10-CM

## 2023-11-29 DIAGNOSIS — Z96.89 SPINAL CORD STIMULATOR STATUS: ICD-10-CM

## 2023-11-29 DIAGNOSIS — E78.5 HYPERLIPIDEMIA, UNSPECIFIED HYPERLIPIDEMIA TYPE: ICD-10-CM

## 2023-11-29 DIAGNOSIS — G89.29 CHRONIC BILATERAL THORACIC BACK PAIN: Primary | ICD-10-CM

## 2023-11-29 LAB
ALBUMIN SERPL-MCNC: 4.4 G/DL (ref 3.5–5.2)
ALBUMIN/GLOB SERPL: 2.2 G/DL
ALP SERPL-CCNC: 78 U/L (ref 39–117)
ALT SERPL W P-5'-P-CCNC: 18 U/L (ref 1–41)
ANION GAP SERPL CALCULATED.3IONS-SCNC: 11.4 MMOL/L (ref 5–15)
AST SERPL-CCNC: 16 U/L (ref 1–40)
BASOPHILS # BLD AUTO: 0.05 10*3/MM3 (ref 0–0.2)
BASOPHILS NFR BLD AUTO: 0.4 % (ref 0–1.5)
BILIRUB SERPL-MCNC: 0.6 MG/DL (ref 0–1.2)
BUN SERPL-MCNC: 25 MG/DL (ref 8–23)
BUN/CREAT SERPL: 19.4 (ref 7–25)
CALCIUM SPEC-SCNC: 9.9 MG/DL (ref 8.6–10.5)
CHLORIDE SERPL-SCNC: 105 MMOL/L (ref 98–107)
CHOLEST SERPL-MCNC: 173 MG/DL (ref 0–200)
CO2 SERPL-SCNC: 27.6 MMOL/L (ref 22–29)
CREAT SERPL-MCNC: 1.29 MG/DL (ref 0.76–1.27)
DEPRECATED RDW RBC AUTO: 38.6 FL (ref 37–54)
EGFRCR SERPLBLD CKD-EPI 2021: 63.1 ML/MIN/1.73
EOSINOPHIL # BLD AUTO: 0 10*3/MM3 (ref 0–0.4)
EOSINOPHIL NFR BLD AUTO: 0 % (ref 0.3–6.2)
ERYTHROCYTE [DISTWIDTH] IN BLOOD BY AUTOMATED COUNT: 11.8 % (ref 12.3–15.4)
GLOBULIN UR ELPH-MCNC: 2 GM/DL
GLUCOSE SERPL-MCNC: 104 MG/DL (ref 65–99)
HBA1C MFR BLD: 5.5 % (ref 4.8–5.6)
HCT VFR BLD AUTO: 50.6 % (ref 37.5–51)
HDLC SERPL-MCNC: 55 MG/DL (ref 40–60)
HGB BLD-MCNC: 16.3 G/DL (ref 13–17.7)
IMM GRANULOCYTES # BLD AUTO: 0.06 10*3/MM3 (ref 0–0.05)
IMM GRANULOCYTES NFR BLD AUTO: 0.5 % (ref 0–0.5)
LDLC SERPL CALC-MCNC: 102 MG/DL (ref 0–100)
LDLC/HDLC SERPL: 1.83 {RATIO}
LYMPHOCYTES # BLD AUTO: 3.77 10*3/MM3 (ref 0.7–3.1)
LYMPHOCYTES NFR BLD AUTO: 30.3 % (ref 19.6–45.3)
MCH RBC QN AUTO: 28.8 PG (ref 26.6–33)
MCHC RBC AUTO-ENTMCNC: 32.2 G/DL (ref 31.5–35.7)
MCV RBC AUTO: 89.6 FL (ref 79–97)
MONOCYTES # BLD AUTO: 0.72 10*3/MM3 (ref 0.1–0.9)
MONOCYTES NFR BLD AUTO: 5.8 % (ref 5–12)
NEUTROPHILS NFR BLD AUTO: 63 % (ref 42.7–76)
NEUTROPHILS NFR BLD AUTO: 7.84 10*3/MM3 (ref 1.7–7)
NRBC BLD AUTO-RTO: 0 /100 WBC (ref 0–0.2)
PLATELET # BLD AUTO: 274 10*3/MM3 (ref 140–450)
PMV BLD AUTO: 10.8 FL (ref 6–12)
POTASSIUM SERPL-SCNC: 4 MMOL/L (ref 3.5–5.2)
PROT SERPL-MCNC: 6.4 G/DL (ref 6–8.5)
PSA SERPL-MCNC: 0.56 NG/ML (ref 0–4)
RBC # BLD AUTO: 5.65 10*6/MM3 (ref 4.14–5.8)
SODIUM SERPL-SCNC: 144 MMOL/L (ref 136–145)
TRIGL SERPL-MCNC: 87 MG/DL (ref 0–150)
VLDLC SERPL-MCNC: 16 MG/DL (ref 5–40)
WBC NRBC COR # BLD AUTO: 12.44 10*3/MM3 (ref 3.4–10.8)

## 2023-11-29 PROCEDURE — 80061 LIPID PANEL: CPT | Performed by: FAMILY MEDICINE

## 2023-11-29 PROCEDURE — G0103 PSA SCREENING: HCPCS | Performed by: FAMILY MEDICINE

## 2023-11-29 PROCEDURE — 85025 COMPLETE CBC W/AUTO DIFF WBC: CPT | Performed by: FAMILY MEDICINE

## 2023-11-29 PROCEDURE — 99214 OFFICE O/P EST MOD 30 MIN: CPT | Performed by: FAMILY MEDICINE

## 2023-11-29 PROCEDURE — 83036 HEMOGLOBIN GLYCOSYLATED A1C: CPT | Performed by: FAMILY MEDICINE

## 2023-11-29 PROCEDURE — 80053 COMPREHEN METABOLIC PANEL: CPT | Performed by: FAMILY MEDICINE

## 2023-11-29 RX ORDER — TIZANIDINE 4 MG/1
4 TABLET ORAL NIGHTLY PRN
Qty: 90 TABLET | Refills: 1 | Status: SHIPPED | OUTPATIENT
Start: 2023-11-29

## 2023-11-29 RX ORDER — VENLAFAXINE HYDROCHLORIDE 75 MG/1
75 CAPSULE, EXTENDED RELEASE ORAL DAILY
Qty: 30 CAPSULE | Refills: 2 | Status: SHIPPED | OUTPATIENT
Start: 2023-11-29

## 2023-11-29 NOTE — PROGRESS NOTES
"Chief Complaint  Back Pain (Follow up )    Subjective        Leandro Salcedo presents to [facility]  Back Pain      Patient presents today to follow-up for thoracic and low back pain.  He previously had a spinal cord stimulator placed for his low back.  The symptoms have been under control however he continues to have ongoing issues with chronic thoracic back pain.  He did have a CT done of the thoracic spine without contrast on 6/23/2023.  This showed that he had degenerative changes seen at multiple levels.  There is concerned about possible scoliosis.  [facility] by Dr. Evangelista.  Patient reports that it is not that effective.  He does have an appointment coming up and I encouraged him to discuss this with his [facility] doctor.  He is taking Zanaflex which does help out.  We did discuss a referral to physical therapy.  Given concerns about previous scoliosis I ordered an x-ray scoliosis series which has not been completed yet.  He is encouraged to get this done.  He continues to take lisinopril 40 mg daily as well as propranolol 160 mg daily for hypertension.  He is also taking propranolol for migraine prevention.  He is still having about 7 or so migraine headaches a month.  He does take Nurtec which helps but occasionally does have to go to urgent care for his migraines.  We did discuss options today including placing him on Effexor.  He has previously tried Effexor and is willing to try this again.  I discussed starting him at the 75 mg dosage.  We did discuss getting labs drawn today.  He continues to take simvastatin for hyperlipidemia.  Plan to check a lipid panel as well today.  He is due for Tdap and flu vaccination so we discussed getting this done.  Objective   Vital Signs:  /88   Pulse 54   Temp 98.3 °F (36.8 °C)   Ht 167.6 cm (66\")   Wt 83.9 kg (185 lb)   SpO2 99%   BMI 29.86 kg/m²   Estimated body mass index is 29.86 " "kg/m² as calculated from the following:    Height as of this encounter: 167.6 cm (66\").    Weight as of this encounter: 83.9 kg (185 lb).               Physical Exam  Vitals reviewed.   Constitutional:       Appearance: Normal appearance.   HENT:      Head: Normocephalic and atraumatic.      Right Ear: External ear normal.      Left Ear: External ear normal.      Mouth/Throat:      Pharynx: No oropharyngeal exudate.   Eyes:      Conjunctiva/sclera: Conjunctivae normal.   Cardiovascular:      Rate and Rhythm: Normal rate and regular rhythm.      Heart sounds: No murmur heard.     No friction rub. No gallop.   Pulmonary:      Effort: Pulmonary effort is normal.      Breath sounds: Normal breath sounds. No wheezing or rhonchi.   Abdominal:      General: Bowel sounds are normal. There is no distension.      Palpations: Abdomen is soft.      Tenderness: There is no abdominal tenderness.   Musculoskeletal:      Comments: Paraspinal hypertonicity noted of the thoracic spine.  Slightly tender to palpation in the right paraspinal musculature.   Skin:     General: Skin is warm and dry.   Neurological:      Mental Status: He is alert and oriented to person, place, and time.      Cranial Nerves: No cranial nerve deficit.   Psychiatric:         Mood and Affect: Mood and affect normal.         Behavior: Behavior normal.         Thought Content: Thought content normal.         Judgment: Judgment normal.        Result Review :                   Assessment and Plan   Diagnoses and all orders for this visit:    1. Chronic bilateral thoracic back pain (Primary)  -     Ambulatory Referral to Physical Therapy Evaluate and treat; Stretching, Strengthening    2. Scoliosis of thoracolumbar spine, unspecified scoliosis type    3. Spinal cord stimulator status    4. Other migraine without status migrainosus, not intractable    5. Need for influenza vaccination    6. Need for Tdap vaccination    7. Primary hypertension  -     CBC & " Differential  -     Comprehensive Metabolic Panel    8. Hyperlipidemia, unspecified hyperlipidemia type  -     Lipid Panel    9. Screening for prostate cancer  -     PSA Screen    10. Abnormal glucose  -     Hemoglobin A1c    11. DDD (degenerative disc disease), lumbar    Other orders  -     tiZANidine (ZANAFLEX) 4 MG tablet; Take 1 tablet by mouth At Night As Needed for Muscle Spasms.  Dispense: 90 tablet; Refill: 1  -     venlafaxine XR (Effexor XR) 75 MG 24 hr capsule; Take 1 capsule by mouth Daily.  Dispense: 30 capsule; Refill: 2    I discussed with patient referral to physical therapy.  He is encouraged to get his x-ray completed of the thoracic spine for scoliosis.  Tizanidine has been prescribed today.  We discussed starting Effexor to help out with migraine prevention.  We will continue with propranolol as well at this time.  I plan to see patient back in 2 months or sooner if needed.  He is instructed to call with any questions or concerns.  His         Follow Up   Return in about 2 months (around 1/29/2024) for chronic thoracic back pain.  Patient was given instructions and counseling regarding his condition or for health maintenance advice. Please see specific information pulled into the AVS if appropriate.

## 2023-12-08 ENCOUNTER — TELEPHONE (OUTPATIENT)
Dept: NEUROLOGY | Facility: CLINIC | Age: 63
End: 2023-12-08

## 2023-12-11 NOTE — TELEPHONE ENCOUNTER
LVM for pt to call office.    HUB OKAY TO RELAY:  12/15/23 at 11am for Botox-if he can do that date/time just document it and I will schedule. thanks

## 2023-12-19 ENCOUNTER — TELEPHONE (OUTPATIENT)
Dept: NEUROLOGY | Facility: CLINIC | Age: 63
End: 2023-12-19
Payer: OTHER GOVERNMENT

## 2023-12-19 NOTE — TELEPHONE ENCOUNTER
Caller: Leandro Salcedo    Relationship to patient: Self    Best call back number: [phone]    Chief complaint: NEED TO RESCHEDULE BOTOX    Type of visit: BOTOX INJECTION    Requested date: PT IS AVAILABLE THE NEXT 8 DAYS    If rescheduling, when is the original appointment:      Additional notes:   SEE EARLIER ENCOUNTER. PT CALLING BACK TO RESCHEDULE. UNABLE TO WARM TRANSFER PT.

## 2024-01-19 ENCOUNTER — PROCEDURE VISIT (OUTPATIENT)
Dept: NEUROLOGY | Facility: CLINIC | Age: 64
End: 2024-01-19
Payer: OTHER GOVERNMENT

## 2024-01-19 DIAGNOSIS — G43.719 INTRACTABLE CHRONIC MIGRAINE WITHOUT AURA AND WITHOUT STATUS MIGRAINOSUS: Primary | ICD-10-CM

## 2024-01-30 ENCOUNTER — OFFICE VISIT (OUTPATIENT)
Dept: FAMILY MEDICINE CLINIC | Facility: CLINIC | Age: 64
End: 2024-01-30
Payer: OTHER GOVERNMENT

## 2024-01-30 VITALS
SYSTOLIC BLOOD PRESSURE: 118 MMHG | WEIGHT: 188 LBS | OXYGEN SATURATION: 96 % | DIASTOLIC BLOOD PRESSURE: 62 MMHG | TEMPERATURE: 97.6 F | HEART RATE: 57 BPM | HEIGHT: 66 IN | BODY MASS INDEX: 30.22 KG/M2

## 2024-01-30 DIAGNOSIS — Z51.81 MEDICATION MONITORING ENCOUNTER: ICD-10-CM

## 2024-01-30 DIAGNOSIS — Z96.89 SPINAL CORD STIMULATOR STATUS: ICD-10-CM

## 2024-01-30 DIAGNOSIS — E78.5 HYPERLIPIDEMIA, UNSPECIFIED HYPERLIPIDEMIA TYPE: ICD-10-CM

## 2024-01-30 DIAGNOSIS — G89.29 CHRONIC BILATERAL LOW BACK PAIN WITHOUT SCIATICA: ICD-10-CM

## 2024-01-30 DIAGNOSIS — I10 PRIMARY HYPERTENSION: ICD-10-CM

## 2024-01-30 DIAGNOSIS — D72.829 LEUKOCYTOSIS, UNSPECIFIED TYPE: ICD-10-CM

## 2024-01-30 DIAGNOSIS — M54.50 CHRONIC BILATERAL LOW BACK PAIN WITHOUT SCIATICA: ICD-10-CM

## 2024-01-30 DIAGNOSIS — G89.29 CHRONIC BILATERAL THORACIC BACK PAIN: Primary | ICD-10-CM

## 2024-01-30 DIAGNOSIS — E55.9 VITAMIN D DEFICIENCY: ICD-10-CM

## 2024-01-30 DIAGNOSIS — G43.809 OTHER MIGRAINE WITHOUT STATUS MIGRAINOSUS, NOT INTRACTABLE: ICD-10-CM

## 2024-01-30 DIAGNOSIS — M41.9 SCOLIOSIS OF THORACOLUMBAR SPINE, UNSPECIFIED SCOLIOSIS TYPE: ICD-10-CM

## 2024-01-30 DIAGNOSIS — M54.6 CHRONIC BILATERAL THORACIC BACK PAIN: Primary | ICD-10-CM

## 2024-01-30 PROBLEM — G43.909 MIGRAINE: Status: ACTIVE | Noted: 2024-01-30

## 2024-01-30 LAB
BASOPHILS # BLD AUTO: 0.03 10*3/MM3 (ref 0–0.2)
BASOPHILS NFR BLD AUTO: 0.4 % (ref 0–1.5)
DEPRECATED RDW RBC AUTO: 41.5 FL (ref 37–54)
EOSINOPHIL # BLD AUTO: 0.13 10*3/MM3 (ref 0–0.4)
EOSINOPHIL NFR BLD AUTO: 1.6 % (ref 0.3–6.2)
ERYTHROCYTE [DISTWIDTH] IN BLOOD BY AUTOMATED COUNT: 12.9 % (ref 12.3–15.4)
HCT VFR BLD AUTO: 43.5 % (ref 37.5–51)
HGB BLD-MCNC: 14.8 G/DL (ref 13–17.7)
IMM GRANULOCYTES # BLD AUTO: 0.02 10*3/MM3 (ref 0–0.05)
IMM GRANULOCYTES NFR BLD AUTO: 0.2 % (ref 0–0.5)
LYMPHOCYTES # BLD AUTO: 3.76 10*3/MM3 (ref 0.7–3.1)
LYMPHOCYTES NFR BLD AUTO: 45.7 % (ref 19.6–45.3)
MCH RBC QN AUTO: 29.9 PG (ref 26.6–33)
MCHC RBC AUTO-ENTMCNC: 34 G/DL (ref 31.5–35.7)
MCV RBC AUTO: 87.9 FL (ref 79–97)
MONOCYTES # BLD AUTO: 0.7 10*3/MM3 (ref 0.1–0.9)
MONOCYTES NFR BLD AUTO: 8.5 % (ref 5–12)
NEUTROPHILS NFR BLD AUTO: 3.58 10*3/MM3 (ref 1.7–7)
NEUTROPHILS NFR BLD AUTO: 43.6 % (ref 42.7–76)
NRBC BLD AUTO-RTO: 0 /100 WBC (ref 0–0.2)
PLATELET # BLD AUTO: 243 10*3/MM3 (ref 140–450)
PMV BLD AUTO: 11.3 FL (ref 6–12)
RBC # BLD AUTO: 4.95 10*6/MM3 (ref 4.14–5.8)
WBC NRBC COR # BLD AUTO: 8.22 10*3/MM3 (ref 3.4–10.8)

## 2024-01-30 PROCEDURE — 80307 DRUG TEST PRSMV CHEM ANLYZR: CPT | Performed by: FAMILY MEDICINE

## 2024-01-30 PROCEDURE — 85025 COMPLETE CBC W/AUTO DIFF WBC: CPT | Performed by: FAMILY MEDICINE

## 2024-01-30 RX ORDER — CHLORHEXIDINE GLUCONATE ORAL RINSE 1.2 MG/ML
SOLUTION DENTAL
COMMUNITY
Start: 2024-01-25

## 2024-01-30 RX ORDER — DOXYCYCLINE HYCLATE 100 MG
TABLET ORAL
COMMUNITY
Start: 2024-01-25

## 2024-01-30 RX ORDER — HYDROCODONE BITARTRATE AND ACETAMINOPHEN 5; 325 MG/1; MG/1
1 TABLET ORAL EVERY 8 HOURS PRN
Qty: 90 TABLET | Refills: 0 | Status: SHIPPED | OUTPATIENT
Start: 2024-01-30

## 2024-01-30 RX ORDER — GABAPENTIN 300 MG/1
CAPSULE ORAL
COMMUNITY
Start: 2024-01-09

## 2024-01-30 RX ORDER — ERGOCALCIFEROL 1.25 MG/1
50000 CAPSULE ORAL WEEKLY
COMMUNITY

## 2024-01-30 RX ORDER — HYDROCODONE BITARTRATE AND ACETAMINOPHEN 5; 325 MG/1; MG/1
TABLET ORAL
COMMUNITY
Start: 2024-01-25 | End: 2024-01-30 | Stop reason: SDUPTHER

## 2024-01-30 NOTE — PROGRESS NOTES
Chief Complaint  Back pain      Subjective        Leandro Salcedo presents to [facility]  History of Present Illness  Patient presents today to discuss ongoing issues with back pain.  We had previously discussed this on the last visit.  He has thoracic and low back pain.  He previously had a spinal cord stimulator placed in his low back.  [facility] however this has not been beneficial.  We did discuss options today.  I discussed with him starting him on Norco as he previously was on Norco to help out with a dental procedure which also helped out his thoracic and lumbar spine.  He had previously had an MRI of the lumbar spine back on 5/4/2022 showing some degenerative changes present with mild levoscoliosis of the lumbar spine.  He was supposed to have a scoliosis x-ray completed but has not done this yet.  I have given him the order to get this completed at his convenience.  X-ray of the thoracic spine had previously shown that he had mild diffuse disc height loss of the thoracic spine.  He does take Zanaflex which also helps out with low back pain.  Reji has been reviewed and is appropriate.  We discussed having serum drug testing done today as well.  He continues to take gabapentin as [facility] by [facility] to also help out with back pain.  He continues to take lisinopril 40 mg daily, HCTZ 25 mg daily as well as propranolol 160 mg daily for hypertension.  He is also taking propranolol for migraine prevention.  I placed him on Effexor and now he is having about 3-4 migraine headaches a month.  He is doing better in this regard.  He also did Botox injections recently with Jolynn Moreno.  We did discuss having follow-up labs done again.  His last CMP did show an increase in his creatinine at 1.29.  We discussed having follow-up today.  CBC had also shown a white blood cell count of 12.44.  Objective   Vital Signs:  /62 (BP Location: Right arm, Patient  "Position: Sitting, Cuff Size: Adult)   Pulse 57   Temp 97.6 °F (36.4 °C) (Temporal)   Ht 167.6 cm (66\")   Wt 85.3 kg (188 lb)   SpO2 96%   BMI 30.34 kg/m²   Estimated body mass index is 30.34 kg/m² as calculated from the following:    Height as of this encounter: 167.6 cm (66\").    Weight as of this encounter: 85.3 kg (188 lb).               Physical Exam  Vitals reviewed.   Constitutional:       Appearance: Normal appearance.   HENT:      Head: Normocephalic and atraumatic.      Right Ear: External ear normal.      Left Ear: External ear normal.      Mouth/Throat:      Pharynx: No oropharyngeal exudate.   Eyes:      Conjunctiva/sclera: Conjunctivae normal.   Cardiovascular:      Rate and Rhythm: Normal rate and regular rhythm.      Heart sounds: No murmur heard.     No friction rub. No gallop.   Pulmonary:      Effort: Pulmonary effort is normal.      Breath sounds: Normal breath sounds. No wheezing or rhonchi.   Abdominal:      General: Bowel sounds are normal. There is no distension.      Palpations: Abdomen is soft.      Tenderness: There is no abdominal tenderness.   Musculoskeletal:      Comments: Paraspinal hypertonicity noted of the thoracic and lumbar spine.  Nontender to palpation.   Skin:     General: Skin is warm and dry.   Neurological:      Mental Status: He is alert and oriented to person, place, and time.      Cranial Nerves: No cranial nerve deficit.   Psychiatric:         Mood and Affect: Mood and affect normal.         Behavior: Behavior normal.         Thought Content: Thought content normal.         Judgment: Judgment normal.        Result Review :                     Assessment and Plan     Diagnoses and all orders for this visit:    1. Chronic bilateral thoracic back pain (Primary)  -     HYDROcodone-acetaminophen (NORCO) 5-325 MG per tablet; Take 1 tablet by mouth Every 8 (Eight) Hours As Needed for Severe Pain.  Dispense: 90 tablet; Refill: 0    2. Scoliosis of thoracolumbar spine, " unspecified scoliosis type  -     HYDROcodone-acetaminophen (NORCO) 5-325 MG per tablet; Take 1 tablet by mouth Every 8 (Eight) Hours As Needed for Severe Pain.  Dispense: 90 tablet; Refill: 0    3. Spinal cord stimulator status    4. Primary hypertension  -     Comprehensive Metabolic Panel  -     CBC & Differential    5. Other migraine without status migrainosus, not intractable    6. Chronic bilateral low back pain without sciatica  -     HYDROcodone-acetaminophen (NORCO) 5-325 MG per tablet; Take 1 tablet by mouth Every 8 (Eight) Hours As Needed for Severe Pain.  Dispense: 90 tablet; Refill: 0    7. Hyperlipidemia, unspecified hyperlipidemia type    8. Leukocytosis, unspecified type  -     CBC & Differential    9. Vitamin D deficiency  -     Vitamin D,25-Hydroxy    10. Medication monitoring encounter  -     Drug Screen 10 With / Conf, WB    Plan as documented above.  I discussed with patient trial of Norco given that tramadol has not been effective.  He has previously been on Norco even recently during a dental procedure and he did have benefit in regards to his back.  Should symptoms continue to persist we may need to consider reimaging the lumbar spine and/or thoracic spine.  He does have degenerative changes noted in the thoracic spine and previously had a spinal cord stimulator placed.  He has had benefit in this regard but again symptoms are still persisting.  Plan to have labs drawn today.  We did discuss continuing current management for hypertension.         Follow Up     Return in about 1 month (around 2/29/2024) for chronic thoracic and low back pain.  Patient was given instructions and counseling regarding his condition or for health maintenance advice. Please see specific information pulled into the AVS if appropriate.

## 2024-02-08 LAB
AMOBARBITAL SERPLBLD CFM-MCNC: NEGATIVE UG/ML
AMPHETAMINES BLD QL SCN: NEGATIVE NG/ML
BARBITURATES SERPLBLD QL: ABNORMAL UG/ML
BARBITURATES SERPLBLD QL: POSITIVE
BENZODIAZ BLD QL: NEGATIVE NG/ML
BUTABARBITAL SERPLBLD CFM-MCNC: NEGATIVE UG/ML
BUTALBITAL SERPLBLD CFM-MCNC: 0.9 UG/ML
CANNABINOIDS BLD QL SCN: NEGATIVE NG/ML
COCAINE+BZE SERPLBLD QL SCN: NEGATIVE NG/ML
METHADONE BLD QL SCN: NEGATIVE NG/ML
OPIATES BLD QL SCN: NEGATIVE NG/ML
OXYCODONE+OXYMORPHONE SERPLBLD QL SCN: NEGATIVE NG/ML
PCP BLD QL SCN: NEGATIVE NG/ML
PENTOBARB SERPLBLD CFM-MCNC: NEGATIVE UG/ML
PHENOBARB SERPLBLD CFM-MCNC: NEGATIVE UG/ML
PROPOXYPH BLD QL SCN: NEGATIVE NG/ML
SECOBARBITAL SERPLBLD CFM-MCNC: NEGATIVE UG/ML

## 2024-02-09 ENCOUNTER — TELEPHONE (OUTPATIENT)
Dept: NEUROLOGY | Facility: CLINIC | Age: 64
End: 2024-02-09

## 2024-02-13 ENCOUNTER — HOSPITAL ENCOUNTER (OUTPATIENT)
Dept: GENERAL RADIOLOGY | Facility: HOSPITAL | Age: 64
Discharge: HOME | End: 2024-02-13
Admitting: FAMILY MEDICINE
Payer: OTHER GOVERNMENT

## 2024-02-13 DIAGNOSIS — M41.9 SCOLIOSIS OF THORACOLUMBAR SPINE, UNSPECIFIED SCOLIOSIS TYPE: ICD-10-CM

## 2024-02-13 PROCEDURE — 72082 X-RAY EXAM ENTIRE SPI 2/3 VW: CPT

## 2024-02-29 ENCOUNTER — OFFICE VISIT (OUTPATIENT)
Dept: FAMILY MEDICINE CLINIC | Facility: CLINIC | Age: 64
End: 2024-02-29
Payer: OTHER GOVERNMENT

## 2024-02-29 VITALS
SYSTOLIC BLOOD PRESSURE: 138 MMHG | HEART RATE: 63 BPM | OXYGEN SATURATION: 96 % | BODY MASS INDEX: 30.22 KG/M2 | RESPIRATION RATE: 18 BRPM | HEIGHT: 66 IN | WEIGHT: 188 LBS | DIASTOLIC BLOOD PRESSURE: 78 MMHG | TEMPERATURE: 98 F

## 2024-02-29 DIAGNOSIS — J40 BRONCHITIS: ICD-10-CM

## 2024-02-29 DIAGNOSIS — G43.719 INTRACTABLE CHRONIC MIGRAINE WITHOUT AURA AND WITHOUT STATUS MIGRAINOSUS: ICD-10-CM

## 2024-02-29 DIAGNOSIS — I10 PRIMARY HYPERTENSION: Primary | ICD-10-CM

## 2024-02-29 DIAGNOSIS — G89.29 CHRONIC BILATERAL LOW BACK PAIN WITHOUT SCIATICA: ICD-10-CM

## 2024-02-29 DIAGNOSIS — E78.5 HYPERLIPIDEMIA, UNSPECIFIED HYPERLIPIDEMIA TYPE: ICD-10-CM

## 2024-02-29 DIAGNOSIS — G89.29 CHRONIC BILATERAL THORACIC BACK PAIN: ICD-10-CM

## 2024-02-29 DIAGNOSIS — Z96.89 SPINAL CORD STIMULATOR STATUS: ICD-10-CM

## 2024-02-29 DIAGNOSIS — Z51.81 MEDICATION MONITORING ENCOUNTER: ICD-10-CM

## 2024-02-29 DIAGNOSIS — M54.50 CHRONIC BILATERAL LOW BACK PAIN WITHOUT SCIATICA: ICD-10-CM

## 2024-02-29 DIAGNOSIS — M41.9 SCOLIOSIS OF THORACOLUMBAR SPINE, UNSPECIFIED SCOLIOSIS TYPE: ICD-10-CM

## 2024-02-29 DIAGNOSIS — E55.9 VITAMIN D DEFICIENCY: ICD-10-CM

## 2024-02-29 DIAGNOSIS — G43.809 OTHER MIGRAINE WITHOUT STATUS MIGRAINOSUS, NOT INTRACTABLE: ICD-10-CM

## 2024-02-29 DIAGNOSIS — M54.6 CHRONIC BILATERAL THORACIC BACK PAIN: ICD-10-CM

## 2024-02-29 LAB
25(OH)D3 SERPL-MCNC: 38.8 NG/ML (ref 30–100)
ALBUMIN SERPL-MCNC: 4.1 G/DL (ref 3.5–5.2)
ALBUMIN/GLOB SERPL: 2 G/DL
ALP SERPL-CCNC: 76 U/L (ref 39–117)
ALT SERPL W P-5'-P-CCNC: 16 U/L (ref 1–41)
ANION GAP SERPL CALCULATED.3IONS-SCNC: 10 MMOL/L (ref 5–15)
AST SERPL-CCNC: 17 U/L (ref 1–40)
BILIRUB SERPL-MCNC: 0.3 MG/DL (ref 0–1.2)
BUN SERPL-MCNC: 22 MG/DL (ref 8–23)
BUN/CREAT SERPL: 13.7 (ref 7–25)
CALCIUM SPEC-SCNC: 9.4 MG/DL (ref 8.6–10.5)
CHLORIDE SERPL-SCNC: 108 MMOL/L (ref 98–107)
CO2 SERPL-SCNC: 26 MMOL/L (ref 22–29)
CREAT SERPL-MCNC: 1.61 MG/DL (ref 0.76–1.27)
EGFRCR SERPLBLD CKD-EPI 2021: 48.1 ML/MIN/1.73
GLOBULIN UR ELPH-MCNC: 2.1 GM/DL
GLUCOSE SERPL-MCNC: 96 MG/DL (ref 65–99)
POTASSIUM SERPL-SCNC: 4.1 MMOL/L (ref 3.5–5.2)
PROT SERPL-MCNC: 6.2 G/DL (ref 6–8.5)
SODIUM SERPL-SCNC: 144 MMOL/L (ref 136–145)

## 2024-02-29 PROCEDURE — 82306 VITAMIN D 25 HYDROXY: CPT | Performed by: FAMILY MEDICINE

## 2024-02-29 PROCEDURE — 80053 COMPREHEN METABOLIC PANEL: CPT | Performed by: FAMILY MEDICINE

## 2024-02-29 RX ORDER — RIMEGEPANT SULFATE 75 MG/75MG
75 TABLET, ORALLY DISINTEGRATING ORAL DAILY PRN
Qty: 10 TABLET | Refills: 0 | Status: SHIPPED | OUTPATIENT
Start: 2024-02-29

## 2024-02-29 NOTE — PROGRESS NOTES
Chief Complaint  Chronic low back pain      Subjective          Leandro Salcedo presents to [facility]  History of Present Illness  Patient presents today to follow-up for chronic low back pain as well as thoracic back pain and scoliosis.  Since last time I saw him he did have a scoliosis survey done which showed levoconvex scoliosis of the mid thoracic spine measuring 25 degrees.  We did discuss options.  Norco has been beneficial in helping to manage his symptoms.  He is agreeable to having physical therapy done as well.  We did discuss continuing current management for chronic low back pain as well as thoracic back pain given benefit with Norco.  He has previously tried tramadol in the past which has not been beneficial.  He continues to take lisinopril 40 mg daily, HCTZ 25 mg daily as well as propranolol 160 mg daily for hypertension.  He is also taking propranolol for migraine prevention.  I placed him on Effexor and now he is having about 3-4 migraine headaches a month.  He is doing better in this regard.  He also did Botox injections recently with Jolynn Moreno.  He recently had bronchitis but his symptoms have now resolved.  As far as [facility] are concerned [facility] but it does not work as well.  I did discuss with him a trial of Nurtec today.  A sample has been provided.  He is due to have a CMP level checked again.  His creatinine was elevated at 1.29 back in November.  Also discussed with him checking vitamin D level today.    Current Outpatient Medications   Medication Instructions    aspirin EC 81 MG EC tablet No dose, route, or frequency recorded.    azelastine (ASTELIN) 0.1 % nasal spray 2 sprays, Nasal, 2 Times Daily, Use in each nostril as directed    baclofen (LIORESAL) 10 MG tablet 1 tablet, Oral, 2 Times Daily    benzonatate (TESSALON) 100 mg, Oral, 3 Times Daily PRN    celecoxib (CeleBREX) 200 MG capsule     chlorhexidine  "(PERIDEX) 0.12 % solution     clotrimazole-betamethasone (LOTRISONE) 1-0.05 % cream 1 application , Topical, 2 Times Daily    diclofenac (VOLTAREN) 75 MG EC tablet 1 tablet, Oral, 2 Times Daily    doxycycline (MONODOX) 100 mg, Oral, 2 Times Daily    doxycycline (VIBRAMYICN) 100 MG tablet     fluticasone (FLONASE) 50 MCG/ACT nasal spray 2 sprays, Nasal, Daily    gabapentin (NEURONTIN) 300 MG capsule     hydroCHLOROthiazide (HYDRODIURIL) 25 MG tablet No dose, route, or frequency recorded.    HYDROcodone-acetaminophen (NORCO) 5-325 MG per tablet 1 tablet, Oral, Every 8 Hours PRN    lidocaine (LIDODERM) 5 %     lidocaine (XYLOCAINE) 5 % ointment     lisinopril (PRINIVIL,ZESTRIL) 40 MG tablet No dose, route, or frequency recorded.    meloxicam (MOBIC) 15 MG tablet 1 tablet, Oral, Daily    methocarbamol (ROBAXIN) 500 MG tablet TAKE 2 TABLETS BY MOUTH THREE TIMES DAILY AS NEEDED FOR MUSCLE SPASMS    naloxone (Narcan) 4 MG/0.1ML nasal spray     Nurtec 75 mg, Oral, Daily PRN    promethazine-dextromethorphan (PROMETHAZINE-DM) 6.25-15 MG/5ML syrup 5 mL, Oral, Every 6 Hours PRN    propranolol (INDERAL) 80 MG tablet     simvastatin (ZOCOR) 40 MG tablet     SUMAtriptan (IMITREX) 100 MG tablet 1 tablet, Oral, See Admin Instructions    tiZANidine (ZANAFLEX) 4 MG tablet 1 tablet, Oral, Nightly PRN    venlafaxine XR (EFFEXOR-XR) 75 MG 24 hr capsule 1 capsule, Oral, Daily       The following portions of the patient's history were reviewed and updated as appropriate: allergies, current medications, past family history, past medical history, past social history, past surgical history, and problem list.    Objective   Vital Signs:   /78 (BP Location: Left arm, Patient Position: Sitting, Cuff Size: Adult)   Pulse 63   Temp 98 °F (36.7 °C) (Oral)   Resp 18   Ht 167.6 cm (66\")   Wt 85.3 kg (188 lb)   SpO2 96%   BMI 30.34 kg/m²     BP Readings from Last 3 Encounters:   02/29/24 138/78   02/24/24 155/97   02/19/24 146/81     Wt " Readings from Last 3 Encounters:   02/29/24 85.3 kg (188 lb)   02/19/24 81.6 kg (179 lb 12.8 oz)   01/30/24 85.3 kg (188 lb)           Physical Exam  Vitals reviewed.   Constitutional:       Appearance: Normal appearance.   HENT:      Head: Normocephalic and atraumatic.      Right Ear: External ear normal.      Left Ear: External ear normal.   Eyes:      Conjunctiva/sclera: Conjunctivae normal.   Cardiovascular:      Rate and Rhythm: Normal rate and regular rhythm.      Heart sounds: No murmur heard.     No friction rub. No gallop.   Pulmonary:      Effort: Pulmonary effort is normal.      Breath sounds: Normal breath sounds. No wheezing or rhonchi.   Abdominal:      General: Bowel sounds are normal. There is no distension.      Palpations: Abdomen is soft.      Tenderness: There is no abdominal tenderness.   Skin:     General: Skin is warm and dry.   Neurological:      Mental Status: He is alert and oriented to person, place, and time.      Cranial Nerves: No cranial nerve deficit.   Psychiatric:         Mood and Affect: Mood and affect normal.         Behavior: Behavior normal.         Thought Content: Thought content normal.         Judgment: Judgment normal.          [unfilled]    Result Review :   The following data was reviewed by: Christofer Lagos DO on 02/29/2024:  Common labs          11/29/2023    11:28 1/30/2024    13:42   Common Labs   Glucose 104     BUN 25     Creatinine 1.29     Sodium 144     Potassium 4.0     Chloride 105     Calcium 9.9     Albumin 4.4     Total Bilirubin 0.6     Alkaline Phosphatase 78     AST (SGOT) 16     ALT (SGPT) 18     WBC 12.44  8.22    Hemoglobin 16.3  14.8    Hematocrit 50.6  43.5    Platelets 274  243    Total Cholesterol 173     Triglycerides 87     HDL Cholesterol 55     LDL Cholesterol  102     Hemoglobin A1C 5.50     PSA 0.560              Lab Results   Component Value Date    SARSANTIGEN Not Detected 02/19/2024    COVID19 Not Detected 12/08/2021    RAPFLUA Negative  11/28/2022    RAPFLUB Negative 11/28/2022    FLUAAG Not Detected 02/19/2024    FLUBAG Not Detected 02/19/2024    RAPSCRN Negative 02/19/2024    BILIRUBINUR Small (1+) (A) 05/10/2023       Procedures        Assessment and Plan    Diagnoses and all orders for this visit:    1. Primary hypertension (Primary)  -     Comprehensive Metabolic Panel  -     Vitamin D,25-Hydroxy    2. Bronchitis    3. Intractable chronic migraine without aura and without status migrainosus    4. Chronic bilateral thoracic back pain  -     Ambulatory Referral to Physical Therapy Evaluate and treat; Stretching, Strengthening  -     Comprehensive Metabolic Panel  -     Vitamin D,25-Hydroxy    5. Scoliosis of thoracolumbar spine, unspecified scoliosis type  -     Ambulatory Referral to Physical Therapy Evaluate and treat; Stretching, Strengthening  -     Comprehensive Metabolic Panel  -     Vitamin D,25-Hydroxy    6. Chronic bilateral low back pain without sciatica  -     Ambulatory Referral to Physical Therapy Evaluate and treat; Stretching, Strengthening  -     Comprehensive Metabolic Panel  -     Vitamin D,25-Hydroxy    7. Medication monitoring encounter  -     Comprehensive Metabolic Panel  -     Vitamin D,25-Hydroxy    8. Spinal cord stimulator status  -     Comprehensive Metabolic Panel  -     Vitamin D,25-Hydroxy    9. Other migraine without status migrainosus, not intractable  -     Comprehensive Metabolic Panel  -     Vitamin D,25-Hydroxy    10. Hyperlipidemia, unspecified hyperlipidemia type  -     Comprehensive Metabolic Panel  -     Vitamin D,25-Hydroxy    11. Vitamin D deficiency  -     Comprehensive Metabolic Panel  -     Vitamin D,25-Hydroxy    Other orders  -     Rimegepant Sulfate (Nurtec) 75 MG tablet dispersible tablet; Take 1 tablet by mouth Daily As Needed (migraine).  Dispense: 10 tablet; Refill: 0    Plan as documented above.  Reji serum drug testing consented been reviewed and are appropriate.  I did discuss with him  continuing Norco for chronic thoracic and lumbar back pain.  He had previously tested positive for barbiturates as he was taking Fioricet.  Given that he is not having any benefit we did discuss switching him to Nurtec.  A sample has been provided today.  We did discuss continuing current management for hypertension.  Plan to have labs done today.  Patient instructed to call with any questions or concerns.  I will see him back in 3 months or sooner if needed.      Medications Discontinued During This Encounter   Medication Reason    traMADol (ULTRAM) 50 MG tablet           Follow Up   Return in about 3 months (around 5/29/2024) for chronic low back pain.  Patient was given instructions and counseling regarding his condition or for health maintenance advice. Please see specific information pulled into the AVS if appropriate.       Christofer Lagos DO  02/29/24  21:10 EST

## 2024-03-01 DIAGNOSIS — N17.9 AKI (ACUTE KIDNEY INJURY): Primary | ICD-10-CM

## 2024-04-12 DIAGNOSIS — M54.50 CHRONIC BILATERAL LOW BACK PAIN WITHOUT SCIATICA: ICD-10-CM

## 2024-04-12 DIAGNOSIS — G89.29 CHRONIC BILATERAL LOW BACK PAIN WITHOUT SCIATICA: ICD-10-CM

## 2024-04-12 DIAGNOSIS — G89.29 CHRONIC BILATERAL THORACIC BACK PAIN: ICD-10-CM

## 2024-04-12 DIAGNOSIS — M54.6 CHRONIC BILATERAL THORACIC BACK PAIN: ICD-10-CM

## 2024-04-12 DIAGNOSIS — M41.9 SCOLIOSIS OF THORACOLUMBAR SPINE, UNSPECIFIED SCOLIOSIS TYPE: ICD-10-CM

## 2024-04-12 RX ORDER — HYDROCODONE BITARTRATE AND ACETAMINOPHEN 5; 325 MG/1; MG/1
1 TABLET ORAL EVERY 8 HOURS PRN
Qty: 90 TABLET | Refills: 0 | Status: SHIPPED | OUTPATIENT
Start: 2024-04-12

## 2024-04-12 NOTE — TELEPHONE ENCOUNTER
Controlled Medication Refill Request:    1.  Last Office Visit:  Office Visit with Christofer Lagos DO (02/29/2024)      2.  Next Office Visit:  5/29/2024     3.  Last UDS Date:  Drug Screen 10 With / LINDA García (01/30/2024 13:42)     4.  Last Consent Signed:  PERMISSION TO TREAT - SCAN - CONSENT FOR TREATMENT WITH CONTROLLED SUBSTANCE 2/29/2024 (03/01/2024)     5.  Medication Requested: Grants     Pharmacy:   [id number] - [place], [place] - [address] AT [facility] OF [address] & [place] - [phone]  - [phone] FX  [address] [facility] [place] [address]  Phone: [phone] Fax: [phone]

## 2024-04-12 NOTE — TELEPHONE ENCOUNTER
Caller: Leandro Salcedo    Relationship: Self    Best call back number: [phone]     Requested Prescriptions:   Requested Prescriptions     Pending Prescriptions Disp Refills    HYDROcodone-acetaminophen (NORCO) 5-325 MG per tablet 90 tablet 0     Sig: Take 1 tablet by mouth Every 8 (Eight) Hours As Needed for Severe Pain.        Pharmacy where request should be sent: [id number] - [place], [place] - [address] [facility] AT [facility] OF [address]/[facility] & [place] - [phone] [facility] [phone] FX     Last office visit with prescribing clinician: 2/29/2024   Last telemedicine visit with prescribing clinician: Visit date not found   Next office visit with prescribing clinician: 5/29/2024     Additional details provided by patient: PATIENT WILL RUN OUT OF MEDICATION TODAY.    Does the patient have less than a 3 day supply:  [x] Yes  [] No        Brien Dalton Rep   04/12/24 14:22 EDT

## 2024-04-15 ENCOUNTER — CLINICAL SUPPORT (OUTPATIENT)
Dept: FAMILY MEDICINE CLINIC | Facility: CLINIC | Age: 64
End: 2024-04-15
Payer: OTHER GOVERNMENT

## 2024-04-15 DIAGNOSIS — I10 PRIMARY HYPERTENSION: ICD-10-CM

## 2024-04-15 DIAGNOSIS — E78.5 HYPERLIPIDEMIA, UNSPECIFIED HYPERLIPIDEMIA TYPE: Primary | ICD-10-CM

## 2024-04-15 PROCEDURE — 36415 COLL VENOUS BLD VENIPUNCTURE: CPT | Performed by: FAMILY MEDICINE

## 2024-04-15 PROCEDURE — 81003 URINALYSIS AUTO W/O SCOPE: CPT | Performed by: FAMILY MEDICINE

## 2024-04-15 PROCEDURE — 80053 COMPREHEN METABOLIC PANEL: CPT | Performed by: FAMILY MEDICINE

## 2024-04-29 RX ORDER — VENLAFAXINE HYDROCHLORIDE 75 MG/1
CAPSULE, EXTENDED RELEASE ORAL DAILY
Qty: 30 CAPSULE | Refills: 2 | Status: SHIPPED | OUTPATIENT
Start: 2024-04-29

## 2024-06-06 ENCOUNTER — TELEPHONE (OUTPATIENT)
Dept: FAMILY MEDICINE CLINIC | Facility: CLINIC | Age: 64
End: 2024-06-06

## 2024-06-06 NOTE — TELEPHONE ENCOUNTER
Caller: Leandro Salcedo    Relationship to patient: Self    Best call back number: [phone] TODAY AND AFTER 1600     Patient is needing: PATIENT REQUESTING AN APPOINTMENT WITH PROVIDER ROBERTO FOR A FOLLOW UP FOR MEDICATION. PATIENT WAS TOLD TO FOLLOW UP IN 6 WEEKS FROM LAST APPOINTMENT ON 2.29.2024 SO HE WILL NEED TO SCHEDULE.    HUB UNABLE TO SCHEDULE IN TIMEFRAME REQUESTED. NO AVAILABLE APPOINTMENTS.     PLEASE CONTACT PATIENT TO ADVISE        JAYMET NO, CALL PREFERRED MAY LEAVE VOICEMAIL.

## 2024-06-07 ENCOUNTER — TELEPHONE (OUTPATIENT)
Dept: NEUROLOGY | Facility: CLINIC | Age: 64
End: 2024-06-07
Payer: OTHER GOVERNMENT

## 2024-06-07 NOTE — TELEPHONE ENCOUNTER
SCHEDULING REQUEST    Caller: Leandro Salcedo    Relationship to patient: Self    Best call back number: [phone]    Chief complaint: PT REQUESTING TO SCHEDULE BOTOX APPT W/ RODY BLACKWELL    Type of visit: BOTOX    Requested date: NEXT AVAILABLE     PLEASE REVIEW AND ADVISE.

## 2024-06-17 ENCOUNTER — TELEPHONE (OUTPATIENT)
Dept: NEUROLOGY | Facility: CLINIC | Age: 64
End: 2024-06-17

## 2024-06-17 NOTE — TELEPHONE ENCOUNTER
The [facility] received a fax that requires your attention. The document has been indexed to the patient’s chart for your review.    Reason for sending: MEDICAL RECORDS NOTIFICATION    Documents Description: UPDATED NEUROLOGY REFERRAL_[facility]_06/12/24    Name of Sender: CATHERINE PARKERAUDRA    Date Indexed: 06/17/24    Notes (if needed): AUTH# [id number]

## 2024-06-18 ENCOUNTER — OFFICE VISIT (OUTPATIENT)
Dept: FAMILY MEDICINE CLINIC | Facility: CLINIC | Age: 64
End: 2024-06-18
Payer: OTHER GOVERNMENT

## 2024-06-18 VITALS
DIASTOLIC BLOOD PRESSURE: 82 MMHG | HEART RATE: 60 BPM | TEMPERATURE: 98.1 F | RESPIRATION RATE: 16 BRPM | WEIGHT: 188.6 LBS | SYSTOLIC BLOOD PRESSURE: 124 MMHG | OXYGEN SATURATION: 98 % | HEIGHT: 66 IN | BODY MASS INDEX: 30.31 KG/M2

## 2024-06-18 DIAGNOSIS — Z96.89 SPINAL CORD STIMULATOR STATUS: ICD-10-CM

## 2024-06-18 DIAGNOSIS — M54.6 CHRONIC BILATERAL THORACIC BACK PAIN: ICD-10-CM

## 2024-06-18 DIAGNOSIS — M41.9 SCOLIOSIS OF THORACOLUMBAR SPINE, UNSPECIFIED SCOLIOSIS TYPE: Primary | ICD-10-CM

## 2024-06-18 DIAGNOSIS — G89.29 CHRONIC BILATERAL THORACIC BACK PAIN: ICD-10-CM

## 2024-06-18 PROCEDURE — 99213 OFFICE O/P EST LOW 20 MIN: CPT

## 2024-06-18 RX ORDER — HYDROCODONE BITARTRATE AND ACETAMINOPHEN 5; 325 MG/1; MG/1
1 TABLET ORAL EVERY 8 HOURS PRN
Qty: 90 TABLET | Refills: 0 | Status: SHIPPED | OUTPATIENT
Start: 2024-06-18

## 2024-06-18 NOTE — PROGRESS NOTES
Chief Complaint  Chief Complaint   Patient presents with    Med Refill       Subjective      Leandro Salcedo presents to [facility]  History of Present Illness  The patient presents for evaluation of back pain.    The patient has a scheduled appointment with his PCP in 09/2023. During his last visit a few months ago, he was prescribed hydrocodone to manage his back pain. Despite attempts to use tramadol, it proved ineffective, leading to the initiation of hydrocodone at the beginning of this year. This medication has proven effective in managing his pain, and he expresses a desire to continue its use. [facility] He has previously engaged in physical therapy, but is not currently doing physical therapy. [facility] due to arthritis, degenerative discs, and joint degeneration. However, the patient has chosen not to pursue this option at this time. He has a stimulator in place and has no history of back surgery.  Patient denies any side effects or constipation with regular use of Norco.      Objective     Medical History:  Past Medical History:   Diagnosis Date    Allergies     Arthritis     Condition not found     hernia    De Quervain's fracture of right wrist 07/25/2017    Headache, cluster     High blood pressure     High cholesterol     Migraine     Need for hepatitis C screening test 2017    neg per patient    Need for shingles vaccine     done    Primary osteoarthritis 07/25/2017    Right 1st [facility] primary osteoarthritis    Prostate cancer screening 06/12/2019    PSA 0.43    Renal calculus or stone      Past Surgical History:   Procedure Laterality Date    ANKLE SURGERY      COLONOSCOPY  2018    [facility]    COLONOSCOPY N/A 04/18/2022    Procedure: COLONOSCOPY FOR SCREENING;  Surgeon: Jackie Watson MD;  Location: [facility] ENDOSCOPY;  Service: Gastroenterology;  Laterality: N/A;  HEMORRHOIDS    EYE SURGERY      INGUINAL  HERNIA REPAIR Right 04/07/2015    JOINT REPLACEMENT      LIPOMA EXCISION      OTHER SURGICAL HISTORY      stimulator in back for pain control    SHOULDER SURGERY      SPINAL FUSION      TONSILLECTOMY      TOTAL KNEE ARTHROPLASTY Left       Social History     Tobacco Use    Smoking status: Never     Passive exposure: Never    Smokeless tobacco: Never   Vaping Use    Vaping status: Never Used   Substance Use Topics    Alcohol use: Never    Drug use: Never     Family History   Family history unknown: Yes       Medications:  Prior to Admission medications    Medication Sig Start Date End Date Taking? Authorizing Provider   aspirin EC 81 MG EC tablet  4/12/22  Yes Emergency, Nurse Ricky, RN   baclofen (LIORESAL) 10 MG tablet Take 1 tablet by mouth 2 (Two) Times a Day.   Yes ProviderGil MD   diclofenac (VOLTAREN) 75 MG EC tablet Take 1 tablet by mouth 2 (Two) Times a Day.   Yes Gil Spivey MD   gabapentin (NEURONTIN) 300 MG capsule  1/9/24  Yes Gil Spivey MD   hydroCHLOROthiazide (HYDRODIURIL) 25 MG tablet  10/26/21  Yes Gil Spivey MD   lidocaine (LIDODERM) 5 %    Yes Gil Spivey MD   lidocaine (XYLOCAINE) 5 % ointment    Yes ProviderGil MD   lisinopril (PRINIVIL,ZESTRIL) 40 MG tablet  5/19/21  Yes ProviderGil MD   Rimegepant Sulfate (Nurtec) 75 MG tablet dispersible tablet Take 1 tablet by mouth Daily As Needed (migraine). 2/29/24  Yes Christofer Lagos DO   venlafaxine XR (EFFEXOR-XR) 75 MG 24 hr capsule TAKE 1 CAPSULE BY MOUTH DAILY 4/29/24  Yes Christofer Lagos DO   propranolol (INDERAL) 80 MG tablet     ProviderGil MD   simvastatin (ZOCOR) 40 MG tablet     ProviderGil MD        Allergies:   Tigan [trimethobenzamide]    Health Maintenance Due   Topic Date Due    ZOSTER VACCINE (1 of 2) Never done    ANNUAL PHYSICAL  Never done    RSV Vaccine - Adults (1 - 1-dose 60+ series) Never done         Vital Signs:   /82 (BP  "Location: Right arm, Patient Position: Sitting, Cuff Size: Adult)   Pulse 60   Temp 98.1 °F (36.7 °C) (Oral)   Resp 16   Ht 167.6 cm (66\")   Wt 85.5 kg (188 lb 9.6 oz)   SpO2 98%   BMI 30.44 kg/m²     Wt Readings from Last 3 Encounters:   06/18/24 85.5 kg (188 lb 9.6 oz)   06/12/24 83.9 kg (185 lb)   04/26/24 86.6 kg (191 lb)     BP Readings from Last 3 Encounters:   06/18/24 124/82   06/12/24 127/74   04/26/24 140/92       Physical Exam  Vitals reviewed.   Constitutional:       Appearance: Normal appearance.   HENT:      Head: Normocephalic and atraumatic.      Right Ear: External ear normal.      Left Ear: External ear normal.   Eyes:      Conjunctiva/sclera: Conjunctivae normal.   Cardiovascular:      Rate and Rhythm: Normal rate and regular rhythm.   Pulmonary:      Effort: Pulmonary effort is normal.      Breath sounds: Normal breath sounds.   Skin:     General: Skin is warm and dry.   Neurological:      Mental Status: He is alert and oriented to person, place, and time.      Cranial Nerves: No cranial nerve deficit.   Psychiatric:         Mood and Affect: Mood and affect normal.         Behavior: Behavior normal.         Thought Content: Thought content normal.         Judgment: Judgment normal.       Physical Exam        Result Review :    The following data was reviewed by RODY Dodd on 06/18/24 at 09:26 EDT:        XR Foot 3+ View Right    Result Date: 6/12/2024  Impression: 1.No acute osseous process identified. 2.Mild degenerative changes as described above. Electronically Signed: Faisal Garcia MD  6/12/2024 11:10 AM EDT  Workstation ID: [id number]    XR Knee 4+ View Left    Result Date: 4/26/2024  Impression: 1.  Changes from patellofemoral arthroplasty.   Electronically Signed By-Chaim Bellamy MD On:4/26/2024 11:41 AM      XR Chest 2 View    Result Date: 2/24/2024    1. No acute cardiopulmonary disease.     Ward Rey M.D.       Electronically Signed and Approved By: Ward" KIZZY Rey on 2/24/2024 at 9:49               Results                 Assessment and Plan    Diagnoses and all orders for this visit:    1. Scoliosis of thoracolumbar spine, unspecified scoliosis type (Primary)    2. Chronic bilateral thoracic back pain    3. Spinal cord stimulator status       Assessment & Plan  1. Back pain.  The continuation of hydrocodone was advised as this has been effective in pain relief.  Refill request will be sent to patient's PCP.  Patient encouraged to keep follow-up appointment for ongoing monitoring of chronic conditions and chronic back pain.            Follow Up   No follow-ups on file.  Patient was given instructions and counseling regarding his condition or for health maintenance advice. Please see specific information pulled into the AVS if appropriate.     Please note that portions of this note were completed with a voice recognition program.

## 2024-07-08 ENCOUNTER — TELEPHONE (OUTPATIENT)
Dept: FAMILY MEDICINE CLINIC | Facility: CLINIC | Age: 64
End: 2024-07-08

## 2024-07-08 DIAGNOSIS — M79.671 FOOT PAIN, BILATERAL: Primary | ICD-10-CM

## 2024-07-08 DIAGNOSIS — M79.672 FOOT PAIN, BILATERAL: Primary | ICD-10-CM

## 2024-07-08 NOTE — TELEPHONE ENCOUNTER
Caller: Leandro Salcedo    Relationship: Self    Best call back number: [phone]    What is the medical concern/diagnosis:  ARTHRITIS IN BOTH FEET     What is the provider, practice or medical service name: [facility]     What is the office location: [address] [place] [address]    What is the office phone number: [phone]

## 2024-07-11 DIAGNOSIS — M54.50 LOW BACK PAIN, UNSPECIFIED BACK PAIN LATERALITY, UNSPECIFIED CHRONICITY, UNSPECIFIED WHETHER SCIATICA PRESENT: ICD-10-CM

## 2024-07-11 DIAGNOSIS — M25.562 LEFT KNEE PAIN, UNSPECIFIED CHRONICITY: ICD-10-CM

## 2024-07-11 DIAGNOSIS — M25.552 LEFT HIP PAIN: Primary | ICD-10-CM

## 2024-08-07 ENCOUNTER — PRIOR AUTHORIZATION (OUTPATIENT)
Dept: NEUROLOGY | Facility: CLINIC | Age: 64
End: 2024-08-07

## 2024-08-07 ENCOUNTER — OFFICE VISIT (OUTPATIENT)
Dept: NEUROLOGY | Facility: CLINIC | Age: 64
End: 2024-08-07
Payer: OTHER GOVERNMENT

## 2024-08-07 VITALS
BODY MASS INDEX: 30.44 KG/M2 | HEIGHT: 66 IN | WEIGHT: 189.4 LBS | HEART RATE: 70 BPM | SYSTOLIC BLOOD PRESSURE: 130 MMHG | DIASTOLIC BLOOD PRESSURE: 73 MMHG

## 2024-08-07 DIAGNOSIS — G43.719 INTRACTABLE CHRONIC MIGRAINE WITHOUT AURA AND WITHOUT STATUS MIGRAINOSUS: Primary | ICD-10-CM

## 2024-08-07 RX ORDER — RIMEGEPANT SULFATE 75 MG/75MG
75 TABLET, ORALLY DISINTEGRATING ORAL DAILY PRN
Qty: 8 TABLET | Refills: 5 | Status: SHIPPED | OUTPATIENT
Start: 2024-08-07

## 2024-08-07 NOTE — PROGRESS NOTES
"Chief Complaint  Migraine    Subjective          Leandro Salcedo presents to [facility]  History of Present Illness  He presents to the office for Botox follow-up.  Has been late [facility] due to [facility] issue. Last Botox injection was on 1/19/24.  States he is having 12 headache days per month.  Feels that he has seen improvement in migraines with Botox for preventative therapy.  Uses  Nurtec with good effect.  Denies side effect.          Objective   Vital Signs:   /73   Pulse 70   Ht 167.6 cm (66\")   Wt 85.9 kg (189 lb 6.4 oz)   BMI 30.57 kg/m²     Physical Exam  HENT:      Head: Normocephalic.   Pulmonary:      Effort: Pulmonary effort is normal.   Neurological:      Mental Status: He is alert and oriented to person, place, and time.      Sensory: Sensation is intact.      Motor: Motor function is intact.      Coordination: Coordination is intact.      Deep Tendon Reflexes: Reflexes are normal and symmetric.        Neurologic Exam     Mental Status   Oriented to person, place, and time.        Result Review :               Assessment and Plan    Diagnoses and all orders for this visit:    1. Intractable chronic migraine without aura and without status migrainosus (Primary)  Assessment & Plan:  Restart Botox for preventative therapy of migraine and Nurtec as needed for abortive therapy.      Botox will be administered per accepted algorithm for chronic migraine with a total of 155 units given divided as follows: 10 units in the , 5 units in the procerus, 20 units in the frontalis, 40 units in the temporalis, 30 units in the occipitalis, 20 units in the cervical paraspinals and 30 units in the trapezius.  This therapy will be given every 12 weeks.           Other orders  -     Rimegepant Sulfate (Nurtec) 75 MG tablet dispersible tablet; Take 1 tablet by mouth Daily As Needed (migraine).  Dispense: 8 tablet; Refill: 5        Follow Up   No follow-ups on " file.  Patient was given instructions and counseling regarding his condition or for health maintenance advice. Please see specific information pulled into the AVS if appropriate.

## 2024-08-08 RX ORDER — HYDROCODONE BITARTRATE AND ACETAMINOPHEN 5; 325 MG/1; MG/1
1 TABLET ORAL EVERY 8 HOURS PRN
Qty: 90 TABLET | Refills: 0 | Status: SHIPPED | OUTPATIENT
Start: 2024-08-08

## 2024-08-08 NOTE — TELEPHONE ENCOUNTER
Caller: Leandro Salcedo    Relationship: Self    Best call back number: [phone]     Requested Prescriptions:   Requested Prescriptions     Pending Prescriptions Disp Refills    HYDROcodone-acetaminophen (Norco) 5-325 MG per tablet 90 tablet 0     Sig: Take 1 tablet by mouth Every 8 (Eight) Hours As Needed for Severe Pain.        Pharmacy where request should be sent: [id number] - [place], [place] - [address] [facility] AT [address]/[facility] [phone] [facility] [phone] FX     Last office visit with prescribing clinician: 2/29/2024   Last telemedicine visit with prescribing clinician: Visit date not found   Next office visit with prescribing clinician: 9/18/2024       Does the patient have less than a 3 day supply:  [x] Yes  [] No    Would you like a call back once the refill request has been completed: [] Yes [x] No    If the office needs to give you a call back, can they leave a voicemail: [] Yes [x] No    Jolynn Molina, PCT   08/08/24 08:08 EDT

## 2024-08-09 ENCOUNTER — PROCEDURE VISIT (OUTPATIENT)
Dept: NEUROLOGY | Facility: CLINIC | Age: 64
End: 2024-08-09
Payer: OTHER GOVERNMENT

## 2024-08-09 DIAGNOSIS — G43.719 INTRACTABLE CHRONIC MIGRAINE WITHOUT AURA AND WITHOUT STATUS MIGRAINOSUS: Primary | ICD-10-CM

## 2024-08-09 PROBLEM — G43.909 MIGRAINE: Status: RESOLVED | Noted: 2024-01-30 | Resolved: 2024-08-09

## 2024-08-09 NOTE — ASSESSMENT & PLAN NOTE
Restart Botox for preventative therapy of migraine and Nurtec as needed for abortive therapy.      Botox will be administered per accepted algorithm for chronic migraine with a total of 155 units given divided as follows: 10 units in the , 5 units in the procerus, 20 units in the frontalis, 40 units in the temporalis, 30 units in the occipitalis, 20 units in the cervical paraspinals and 30 units in the trapezius.  This therapy will be given every 12 weeks.

## 2024-09-06 ENCOUNTER — TELEPHONE (OUTPATIENT)
Dept: FAMILY MEDICINE CLINIC | Facility: CLINIC | Age: 64
End: 2024-09-06
Payer: OTHER GOVERNMENT

## 2024-09-06 DIAGNOSIS — M79.672 FOOT PAIN, BILATERAL: Primary | ICD-10-CM

## 2024-09-06 DIAGNOSIS — M79.671 FOOT PAIN, BILATERAL: Primary | ICD-10-CM

## 2024-09-06 NOTE — TELEPHONE ENCOUNTER
Caller: Leandro Salcedo    Relationship: Self    Best call back number: [phone]     What is the medical concern/diagnosis: FOOT PAIN    What specialty or service is being requested: PODIATRY     What is the provider, practice or medical service name: [facility]    What is the office location: [place]    What is the office phone number: [phone]

## 2024-09-18 ENCOUNTER — OFFICE VISIT (OUTPATIENT)
Dept: FAMILY MEDICINE CLINIC | Facility: CLINIC | Age: 64
End: 2024-09-18
Payer: OTHER GOVERNMENT

## 2024-09-18 VITALS
DIASTOLIC BLOOD PRESSURE: 70 MMHG | WEIGHT: 194 LBS | TEMPERATURE: 98.1 F | OXYGEN SATURATION: 96 % | HEART RATE: 57 BPM | HEIGHT: 66 IN | SYSTOLIC BLOOD PRESSURE: 122 MMHG | BODY MASS INDEX: 31.18 KG/M2

## 2024-09-18 DIAGNOSIS — R73.09 ABNORMAL GLUCOSE: ICD-10-CM

## 2024-09-18 DIAGNOSIS — I10 PRIMARY HYPERTENSION: ICD-10-CM

## 2024-09-18 DIAGNOSIS — M41.9 SCOLIOSIS OF THORACOLUMBAR SPINE, UNSPECIFIED SCOLIOSIS TYPE: ICD-10-CM

## 2024-09-18 DIAGNOSIS — M54.6 CHRONIC BILATERAL THORACIC BACK PAIN: ICD-10-CM

## 2024-09-18 DIAGNOSIS — Z51.81 MEDICATION MONITORING ENCOUNTER: ICD-10-CM

## 2024-09-18 DIAGNOSIS — M54.50 CHRONIC BILATERAL LOW BACK PAIN WITHOUT SCIATICA: Primary | ICD-10-CM

## 2024-09-18 DIAGNOSIS — G43.719 INTRACTABLE CHRONIC MIGRAINE WITHOUT AURA AND WITHOUT STATUS MIGRAINOSUS: ICD-10-CM

## 2024-09-18 DIAGNOSIS — G89.29 CHRONIC BILATERAL THORACIC BACK PAIN: ICD-10-CM

## 2024-09-18 DIAGNOSIS — Z12.5 SCREENING FOR PROSTATE CANCER: ICD-10-CM

## 2024-09-18 DIAGNOSIS — E78.5 HYPERLIPIDEMIA, UNSPECIFIED HYPERLIPIDEMIA TYPE: ICD-10-CM

## 2024-09-18 DIAGNOSIS — G89.29 CHRONIC BILATERAL LOW BACK PAIN WITHOUT SCIATICA: Primary | ICD-10-CM

## 2024-09-18 DIAGNOSIS — E55.9 VITAMIN D DEFICIENCY: ICD-10-CM

## 2024-09-18 DIAGNOSIS — Z23 NEED FOR INFLUENZA VACCINATION: ICD-10-CM

## 2024-09-18 PROCEDURE — 99214 OFFICE O/P EST MOD 30 MIN: CPT | Performed by: FAMILY MEDICINE

## 2024-09-18 PROCEDURE — 90656 IIV3 VACC NO PRSV 0.5 ML IM: CPT | Performed by: FAMILY MEDICINE

## 2024-09-18 PROCEDURE — 90471 IMMUNIZATION ADMIN: CPT | Performed by: FAMILY MEDICINE

## 2024-09-18 RX ORDER — HYDROCODONE BITARTRATE AND ACETAMINOPHEN 5; 325 MG/1; MG/1
1 TABLET ORAL EVERY 8 HOURS PRN
Qty: 90 TABLET | Refills: 0 | Status: SHIPPED | OUTPATIENT
Start: 2024-09-18

## 2024-09-23 ENCOUNTER — TELEPHONE (OUTPATIENT)
Dept: FAMILY MEDICINE CLINIC | Facility: CLINIC | Age: 64
End: 2024-09-23
Payer: OTHER GOVERNMENT

## 2024-09-23 DIAGNOSIS — M54.6 CHRONIC BILATERAL THORACIC BACK PAIN: ICD-10-CM

## 2024-09-23 DIAGNOSIS — G56.01 CARPAL TUNNEL SYNDROME ON RIGHT: Primary | ICD-10-CM

## 2024-09-23 DIAGNOSIS — G89.29 CHRONIC BILATERAL THORACIC BACK PAIN: ICD-10-CM

## 2024-10-04 ENCOUNTER — TELEPHONE (OUTPATIENT)
Dept: FAMILY MEDICINE CLINIC | Facility: CLINIC | Age: 64
End: 2024-10-04

## 2024-10-04 NOTE — TELEPHONE ENCOUNTER
Caller: Leandro Salcedo    Relationship: Self    Best call back number: [phone]     What is the medical concern/diagnosis: PODIATRIST     What specialty or service is being requested: PODIATRIST     What is the provider, practice or medical service name: DR.JEFFREY HAINES     What is the office location: [address] [place] [address]     What is the office phone number: [phone]     Any additional details: PLEASE CALL AND ADVISE.

## 2024-10-08 DIAGNOSIS — M79.671 FOOT PAIN, BILATERAL: Primary | ICD-10-CM

## 2024-10-08 DIAGNOSIS — M79.672 FOOT PAIN, BILATERAL: Primary | ICD-10-CM

## 2024-10-15 ENCOUNTER — TELEPHONE (OUTPATIENT)
Dept: FAMILY MEDICINE CLINIC | Facility: CLINIC | Age: 64
End: 2024-10-15
Payer: OTHER GOVERNMENT

## 2024-10-15 DIAGNOSIS — M25.511 RIGHT SHOULDER PAIN, UNSPECIFIED CHRONICITY: Primary | ICD-10-CM

## 2024-10-15 NOTE — TELEPHONE ENCOUNTER
Caller: Leandro Salcedo    Relationship: Self    Best call back number:     What is the medical concern/diagnosis: PAIN IN RIGHT SHOULDER PAIN     What specialty or service is being requested: ORTHOPEDIC     What is the provider, practice or medical service name:      What is the office location: [facility]    What is the office phone number: N/A     Any additional details:

## 2024-11-04 RX ORDER — HYDROCODONE BITARTRATE AND ACETAMINOPHEN 5; 325 MG/1; MG/1
1 TABLET ORAL EVERY 8 HOURS PRN
Qty: 90 TABLET | Refills: 0 | Status: SHIPPED | OUTPATIENT
Start: 2024-11-04

## 2024-11-04 NOTE — TELEPHONE ENCOUNTER
Caller: Leandro Salcedo    Relationship: Self    Best call back number: [phone]     Requested Prescriptions:   Requested Prescriptions     Pending Prescriptions Disp Refills    HYDROcodone-acetaminophen (Norco) 5-325 MG per tablet 90 tablet 0     Sig: Take 1 tablet by mouth Every 8 (Eight) Hours As Needed for Severe Pain.        Pharmacy where request should be sent: [id number] - [place], [place] - [address] [facility] AT [address]/[facility] [phone] [facility] [phone] FX     Last office visit with prescribing clinician: 9/18/2024   Last telemedicine visit with prescribing clinician: Visit date not found   Next office visit with prescribing clinician: 12/18/2024     Additional details provided by patient: 2 PILLS LEFT    Does the patient have less than a 3 day supply:  [x] Yes  [] No    Would you like a call back once the refill request has been completed: [x] Yes [] No    If the office needs to give you a call back, can they leave a voicemail: [x] Yes [] No    Brien Mcrae Rep   11/04/24 12:30 EST

## 2024-11-05 ENCOUNTER — OFFICE VISIT (OUTPATIENT)
Dept: ORTHOPEDIC SURGERY | Facility: CLINIC | Age: 64
End: 2024-11-05
Payer: OTHER GOVERNMENT

## 2024-11-05 VITALS
OXYGEN SATURATION: 96 % | BODY MASS INDEX: 31.31 KG/M2 | SYSTOLIC BLOOD PRESSURE: 144 MMHG | HEART RATE: 62 BPM | WEIGHT: 194.8 LBS | DIASTOLIC BLOOD PRESSURE: 98 MMHG | HEIGHT: 66 IN

## 2024-11-05 DIAGNOSIS — M75.41 IMPINGEMENT SYNDROME OF RIGHT SHOULDER: Primary | ICD-10-CM

## 2024-11-05 DIAGNOSIS — M65.4 DE QUERVAIN'S TENOSYNOVITIS, RIGHT: ICD-10-CM

## 2024-11-05 RX ORDER — TRIAMCINOLONE ACETONIDE 40 MG/ML
40 INJECTION, SUSPENSION INTRA-ARTICULAR; INTRAMUSCULAR
Status: COMPLETED | OUTPATIENT
Start: 2024-11-05 | End: 2024-11-05

## 2024-11-05 RX ORDER — LIDOCAINE HYDROCHLORIDE 10 MG/ML
1 INJECTION, SOLUTION INFILTRATION; PERINEURAL
Status: COMPLETED | OUTPATIENT
Start: 2024-11-05 | End: 2024-11-05

## 2024-11-05 RX ORDER — LIDOCAINE HYDROCHLORIDE 10 MG/ML
5 INJECTION, SOLUTION INFILTRATION; PERINEURAL
Status: COMPLETED | OUTPATIENT
Start: 2024-11-05 | End: 2024-11-05

## 2024-11-05 RX ADMIN — TRIAMCINOLONE ACETONIDE 40 MG: 40 INJECTION, SUSPENSION INTRA-ARTICULAR; INTRAMUSCULAR at 16:24

## 2024-11-05 RX ADMIN — LIDOCAINE HYDROCHLORIDE 1 ML: 10 INJECTION, SOLUTION INFILTRATION; PERINEURAL at 16:24

## 2024-11-05 RX ADMIN — LIDOCAINE HYDROCHLORIDE 5 ML: 10 INJECTION, SOLUTION INFILTRATION; PERINEURAL at 16:24

## 2024-11-05 NOTE — PROGRESS NOTES
"Chief Complaint  Follow-up of the Right Hand and Follow-up of the Right Shoulder     Subjective      Leandro Salcedo presents to [facility] for follow up of the right hand and the right shoulder. He has had pain in the shoulder that waxes and wanes.  He has pain with deQuervain testing.  His last injections were 6/6/23.  He has had increase pain the last couple months and here for repeat steroid injections.      Allergies   Allergen Reactions    Tigan [Trimethobenzamide] Anaphylaxis        Social History     Socioeconomic History    Marital status:    Tobacco Use    Smoking status: Never     Passive exposure: Never    Smokeless tobacco: Never   Vaping Use    Vaping status: Never Used   Substance and Sexual Activity    Alcohol use: Never    Drug use: Never    Sexual activity: Never        I reviewed the patient's chief complaint, history of present illness, review of systems, past medical history, surgical history, family history, social history, medications, and allergy list.     Review of Systems     Constitutional: Denies fevers, chills, weight loss  Cardiovascular: Denies chest pain, shortness of breath  Skin: Denies rashes, acute skin changes  Neurologic: Denies headache, loss of consciousness        Vital Signs:   /98   Pulse 62   Ht 167.6 cm (66\")   Wt 88.4 kg (194 lb 12.8 oz)   SpO2 96%   BMI 31.44 kg/m²          Physical Exam  General: Alert. No acute distress    Ortho Exam      RIGHT SHOULDER Forward flexion 160 Abduction 160. External rotation 60. Internal rotation L5. Positive  Sensation intact to light touch, median, radial, ulnar nerve. Positive AIN, PIN, ulnar nerve motor. Positive pulses. Positive Impingement signs. Good strength in triceps, biceps, deltoid, wrist extensors and wrist flexors.     RIGHT WRIST Negative Compression testing/ Positive Tinels. Positive Finkelstein's. Negative Jaimes's testing. Negative CMC grind testing. Positive Phalens. Full ROM " of the hand, fingers, elbow and wrist. Negative Triggering of the digit. Sensation grossly intact to light touch, median, radial and ulnar nerve. Positive AIN, PIN and ulnar nerve motor function intact. Axillary nerve intact. Positive pulses.          Large Joint Arthrocentesis: R subacromial bursa  Date/Time: 11/5/2024 4:24 PM  Consent given by: patient  Site marked: site marked  Timeout: Immediately prior to procedure a time out was called to verify the correct patient, procedure, equipment, support staff and site/side marked as required   Supporting Documentation  Indications: pain   Procedure Details  Location: shoulder - R subacromial bursa  Needle gauge: 21 G.  Medications administered: 5 mL lidocaine 1 %; 40 mg triamcinolone acetonide 40 MG/ML  Patient tolerance: patient tolerated the procedure well with no immediate complications      RIGHT DE QUERVAINS  Date/Time: 11/5/2024 4:24 PM  Supporting Documentation  Indications: pain   Procedure Details  Location: -   Location: RIGHT DE QUERVAINS.Needle size: 23 G  Medications administered: 1 mL lidocaine 1 %; 40 mg triamcinolone acetonide 40 MG/ML  Patient tolerance: patient tolerated the procedure well with no immediate complications      This injection documentation was Scribed for Farideh Rucker MD by Taylor Hall MA.  11/05/24   16:25 EST      Imaging Results (Most Recent)       None             Result Review :       Assessment and Plan     Diagnoses and all orders for this visit:    1. Impingement syndrome of right shoulder (Primary)    2. De Quervain's tenosynovitis, right        Discussed the treatment plan with the patient.     Discussed the risks and benefits of conservative measures. The patient expressed understanding and wished to proceed with a right shoulder and right deQuervain steroid injection.  He tolerated the injection well.       Call or return if worsening symptoms.    Follow Up     PRN      Patient was given instructions and counseling  regarding his condition or for health maintenance advice. Please see specific information pulled into the AVS if appropriate.     Scribed for Farideh Rucker MD by Nicolette Ascencio MA.  11/05/24   16:06 EST    I have personally performed the services described in this document as scribed by the above individual and it is both accurate and complete. Farideh Rucker MD 11/05/24

## 2024-11-08 ENCOUNTER — PROCEDURE VISIT (OUTPATIENT)
Dept: NEUROLOGY | Facility: CLINIC | Age: 64
End: 2024-11-08
Payer: OTHER GOVERNMENT

## 2024-11-08 DIAGNOSIS — G43.719 INTRACTABLE CHRONIC MIGRAINE WITHOUT AURA AND WITHOUT STATUS MIGRAINOSUS: Primary | ICD-10-CM

## 2024-12-09 ENCOUNTER — OFFICE VISIT (OUTPATIENT)
Dept: FAMILY MEDICINE CLINIC | Facility: CLINIC | Age: 64
End: 2024-12-09
Payer: OTHER GOVERNMENT

## 2024-12-09 VITALS
HEIGHT: 66 IN | OXYGEN SATURATION: 96 % | SYSTOLIC BLOOD PRESSURE: 140 MMHG | DIASTOLIC BLOOD PRESSURE: 80 MMHG | BODY MASS INDEX: 31.55 KG/M2 | WEIGHT: 196.3 LBS | TEMPERATURE: 98.3 F | HEART RATE: 63 BPM

## 2024-12-09 DIAGNOSIS — J10.1 INFLUENZA B: Primary | ICD-10-CM

## 2024-12-09 DIAGNOSIS — R52 BODY ACHES: ICD-10-CM

## 2024-12-09 DIAGNOSIS — J02.9 SORE THROAT: ICD-10-CM

## 2024-12-09 LAB
EXPIRATION DATE: ABNORMAL
EXPIRATION DATE: NORMAL
FLUAV AG UPPER RESP QL IA.RAPID: NOT DETECTED
FLUBV AG UPPER RESP QL IA.RAPID: DETECTED
INTERNAL CONTROL: ABNORMAL
INTERNAL CONTROL: NORMAL
Lab: ABNORMAL
Lab: NORMAL
S PYO AG THROAT QL: NEGATIVE
SARS-COV-2 AG UPPER RESP QL IA.RAPID: NOT DETECTED

## 2024-12-09 PROCEDURE — 99213 OFFICE O/P EST LOW 20 MIN: CPT | Performed by: FAMILY MEDICINE

## 2024-12-09 PROCEDURE — 87428 SARSCOV & INF VIR A&B AG IA: CPT | Performed by: FAMILY MEDICINE

## 2024-12-09 PROCEDURE — 87880 STREP A ASSAY W/OPTIC: CPT | Performed by: FAMILY MEDICINE

## 2024-12-09 RX ORDER — OSELTAMIVIR PHOSPHATE 75 MG/1
75 CAPSULE ORAL 2 TIMES DAILY
Qty: 10 CAPSULE | Refills: 0 | Status: SHIPPED | OUTPATIENT
Start: 2024-12-09 | End: 2024-12-14

## 2024-12-09 RX ORDER — ONDANSETRON 4 MG/1
4 TABLET, ORALLY DISINTEGRATING ORAL EVERY 8 HOURS PRN
Qty: 10 TABLET | Refills: 0 | Status: SHIPPED | OUTPATIENT
Start: 2024-12-09

## 2024-12-09 NOTE — PROGRESS NOTES
"Chief Complaint  Sore Throat (/) and Generalized Body Aches (All since this morning /)    Subjective      Leandro Salcedo is a 64 y.o. male who presents to [facility]     History of Present Illness  The patient is a 64-year-old male who presents today with complaints of body aches and a sore throat that started this morning around 5 AM.    He reports experiencing fever, vomiting, diarrhea, ear pressure, facial congestion, cough, and dizziness. He has been in contact with a coworker who sought emergency care the previous night, although he is uncertain about the coworker's diagnosis. He received an influenza vaccine last month. He has previously been prescribed Tamiflu without any adverse reactions. He is requesting a work excuse note.    He has eardrops at home.    MEDICATIONS  Past: Tamiflu    IMMUNIZATIONS  He received an influenza vaccine last month.        Patient Care Team:  Christofer Lagos DO as PCP - General (Family Medicine)    Objective   Vital Signs:   Vitals:    12/09/24 1129   BP: 140/80   BP Location: Left arm   Patient Position: Sitting   Cuff Size: Adult   Pulse: 63   Temp: 98.3 °F (36.8 °C)   TempSrc: Oral   SpO2: 96%   Weight: 89 kg (196 lb 4.8 oz)   Height: 167.6 cm (66\")     Body mass index is 31.68 kg/m².    Wt Readings from Last 3 Encounters:   12/09/24 89 kg (196 lb 4.8 oz)   11/13/24 87.5 kg (193 lb)   11/05/24 88.4 kg (194 lb 12.8 oz)     BP Readings from Last 3 Encounters:   12/09/24 140/80   11/13/24 141/87   11/05/24 144/98       Health Maintenance   Topic Date Due    ZOSTER VACCINE (1 of 2) Never done    ANNUAL PHYSICAL  Never done    COVID-19 Vaccine (5 - 2024-25 season) 09/01/2024    LIPID PANEL  11/29/2024    BMI FOLLOWUP  02/28/2025    COLORECTAL CANCER SCREENING  04/18/2027    TDAP/TD VACCINES (2 - Td or Tdap) 11/29/2033    HEPATITIS C SCREENING  Completed    INFLUENZA VACCINE  Completed    Pneumococcal Vaccine 0-64  Aged Out       Lab Results (last 24 " hours)       Procedure Component Value Units Date/Time    POCT SARS-CoV-2 Antigen KIERSTEN + Flu [[id number]]  (Abnormal) Collected: 12/09/24 1142    Specimen: Swab Updated: 12/09/24 1142     SARS Antigen Not Detected     Influenza A Antigen KIERSTEN Not Detected     Influenza B Antigen KIERSTEN Detected     Internal Control Passed     Lot Number [id number]     Expiration Date 10-    POCT rapid strep A [[id number]] Collected: 12/09/24 1142    Specimen: Swab Updated: 12/09/24 1143     Rapid Strep A Screen Negative     Internal Control Passed     Lot Number [id number]     Expiration Date 2-               Physical Exam  Vitals and nursing note reviewed.   Constitutional:       General: He is not in acute distress.     Appearance: Normal appearance. He is ill-appearing.   HENT:      Head: Normocephalic and atraumatic.      Right Ear: External ear normal. There is impacted cerumen.      Left Ear: Tympanic membrane, ear canal and external ear normal. There is no impacted cerumen.      Nose: Congestion and rhinorrhea present.      Mouth/Throat:      Mouth: Mucous membranes are moist.      Pharynx: Posterior oropharyngeal erythema present. No oropharyngeal exudate.   Eyes:      General: No scleral icterus.        Right eye: No discharge.         Left eye: No discharge.      Extraocular Movements: Extraocular movements intact.      Conjunctiva/sclera: Conjunctivae normal.      Pupils: Pupils are equal, round, and reactive to light.   Cardiovascular:      Rate and Rhythm: Normal rate and regular rhythm.      Heart sounds: Normal heart sounds.   Pulmonary:      Effort: Pulmonary effort is normal.      Breath sounds: Normal breath sounds.   Lymphadenopathy:      Cervical: Cervical adenopathy (mild) present.   Skin:     General: Skin is warm and dry.   Neurological:      General: No focal deficit present.      Mental Status: He is alert and oriented to person, place, and time.   Psychiatric:         Mood and Affect: Mood normal.          Behavior: Behavior normal.          Physical Exam  Ear is completely blocked with wax. Eyes were examined. Throat was examined. Nose shows a little drainage.  Lungs sound normal. No crackles heard.  Heart sounds normal.      Result Review   The following data was reviewed by: Stacey Leong MD on 12/09/2024:  [x]  Tests & Results  []  Hospitalization/Emergency Department/Urgent Care  []  Internal/External Consultant Notes    Results  Laboratory Studies  Rapid COVID-19 test was negative. Influenza A and B test was positive for influenza B.      Procedures          ASSESSMENT/PLAN  Diagnoses and all orders for this visit:    1. Influenza B (Primary)    2. Sore throat  -     POCT rapid strep A    3. Body aches  -     POCT SARS-CoV-2 Antigen KIERSTEN + Flu    Other orders  -     oseltamivir (Tamiflu) 75 MG capsule; Take 1 capsule by mouth 2 (Two) Times a Day for 5 days.  Dispense: 10 capsule; Refill: 0  -     ondansetron ODT (ZOFRAN-ODT) 4 MG disintegrating tablet; Place 1 tablet on the tongue Every 8 (Eight) Hours As Needed for Nausea or Vomiting for up to 10 doses.  Dispense: 10 tablet; Refill: 0        Assessment & Plan  1. Influenza B.  He tested positive for influenza B. He is in the first 24 hours of the flu. A prescription for Tamiflu has been issued to manage the symptoms and potentially shorten the duration of the illness. He is advised to stay home for 5 days due to the contagious nature of the flu. A work excuse note will be provided. He should wear a mask when around others, especially those with compromised immune systems, even after completing the Tamiflu course. Adequate hydration is recommended. A prescription for Zofran has been provided to alleviate nausea and prevent vomiting. If Jose is out of Tamiflu, he should call to switch the prescription to a different pharmacy.    2. Cerumen impaction.  His ear is completely blocked with wax. He is advised to use eardrops at home. If the issue persists  after a couple of weeks, he should return for ear irrigation.                Leandro Salcedo  reports that he has never smoked. He has never been exposed to tobacco smoke. He has never used smokeless tobacco.              FOLLOW UP  No follow-ups on file.  Patient was given instructions and counseling regarding his condition or for health maintenance advice. Please see specific information pulled into the AVS if appropriate.       Stacey Leong MD  12/09/24  12:27 EST    Part of this note may be an electronic transcription/translation of spoken language to printed text using the Dragon Dictation System.    Patient or patient representative verbalized consent for the use of Ambient Listening during the visit with  Stacey Leong MD for chart documentation. 12/9/2024  11:47 EST

## 2024-12-09 NOTE — PATIENT INSTRUCTIONS
"** ANY CONDITION CAN CHANGE, despite proper treatment.      ** IF YOUR SYMPTOMS WORSEN, CHANGE, or PERSIST,    then get to the nearest Emergency Department (ER), call your PCP, or go to Urgent Care.      ** FOLLOW-UP CARE IS YOUR RESPONSIBILITY.       ** COMPLETE ALL TESTS & SCHEDULE ALL REFERRALS ordered or recommended by PCP.       ** CONTACT primary care office for ALL TEST, X-RAY, and other RESULTS within 3 DAYS if not available in MyChart.      Please do NOT assume that \"no news is good news.\"       ** ALL MEDICATIONS CAN HAVE SIDE EFFECTS & INTERACTIONS.    Please review these, and ask your pharmacist or contact your primary care provider for questions or concerns.    Be sure that your pharmacist and your PCP have a complete list of all medications and supplements you're taking.       "

## 2024-12-09 NOTE — LETTER
December 9, 2024     Patient: Leandro Salcedo   YOB: 1960   Date of Visit: 12/9/2024       To Whom It May Concern:    It is my medical opinion that Leandro Salcedo should remain out of work until 12/14/2024 .           Sincerely,        Stacey Leong MD    CC: No Recipients

## 2024-12-18 ENCOUNTER — OFFICE VISIT (OUTPATIENT)
Dept: FAMILY MEDICINE CLINIC | Facility: CLINIC | Age: 64
End: 2024-12-18
Payer: OTHER GOVERNMENT

## 2024-12-18 VITALS
BODY MASS INDEX: 31.87 KG/M2 | WEIGHT: 198.3 LBS | SYSTOLIC BLOOD PRESSURE: 140 MMHG | HEART RATE: 80 BPM | TEMPERATURE: 98.1 F | DIASTOLIC BLOOD PRESSURE: 80 MMHG | OXYGEN SATURATION: 97 % | HEIGHT: 66 IN

## 2024-12-18 DIAGNOSIS — M54.6 CHRONIC BILATERAL THORACIC BACK PAIN: Primary | ICD-10-CM

## 2024-12-18 DIAGNOSIS — M25.511 RIGHT SHOULDER PAIN, UNSPECIFIED CHRONICITY: ICD-10-CM

## 2024-12-18 DIAGNOSIS — M54.50 CHRONIC BILATERAL LOW BACK PAIN WITHOUT SCIATICA: ICD-10-CM

## 2024-12-18 DIAGNOSIS — R73.09 ABNORMAL GLUCOSE: ICD-10-CM

## 2024-12-18 DIAGNOSIS — Z12.5 SCREENING FOR PROSTATE CANCER: ICD-10-CM

## 2024-12-18 DIAGNOSIS — Z51.81 MEDICATION MONITORING ENCOUNTER: ICD-10-CM

## 2024-12-18 DIAGNOSIS — G89.29 CHRONIC BILATERAL THORACIC BACK PAIN: Primary | ICD-10-CM

## 2024-12-18 DIAGNOSIS — I10 PRIMARY HYPERTENSION: ICD-10-CM

## 2024-12-18 DIAGNOSIS — G89.29 CHRONIC BILATERAL LOW BACK PAIN WITHOUT SCIATICA: ICD-10-CM

## 2024-12-18 DIAGNOSIS — E55.9 VITAMIN D DEFICIENCY: ICD-10-CM

## 2024-12-18 DIAGNOSIS — E78.5 HYPERLIPIDEMIA, UNSPECIFIED HYPERLIPIDEMIA TYPE: ICD-10-CM

## 2024-12-18 DIAGNOSIS — M65.4 DE QUERVAIN'S TENOSYNOVITIS, RIGHT: ICD-10-CM

## 2024-12-18 LAB
25(OH)D3 SERPL-MCNC: 54.4 NG/ML (ref 30–100)
ALBUMIN SERPL-MCNC: 3.9 G/DL (ref 3.5–5.2)
ALBUMIN/GLOB SERPL: 1.7 G/DL
ALP SERPL-CCNC: 64 U/L (ref 39–117)
ALT SERPL W P-5'-P-CCNC: 23 U/L (ref 1–41)
ANION GAP SERPL CALCULATED.3IONS-SCNC: 9.3 MMOL/L (ref 5–15)
AST SERPL-CCNC: 23 U/L (ref 1–40)
BASOPHILS # BLD AUTO: 0.03 10*3/MM3 (ref 0–0.2)
BASOPHILS NFR BLD AUTO: 0.3 % (ref 0–1.5)
BILIRUB SERPL-MCNC: 0.3 MG/DL (ref 0–1.2)
BUN SERPL-MCNC: 28 MG/DL (ref 8–23)
BUN/CREAT SERPL: 19.7 (ref 7–25)
CALCIUM SPEC-SCNC: 9.4 MG/DL (ref 8.6–10.5)
CHLORIDE SERPL-SCNC: 111 MMOL/L (ref 98–107)
CHOLEST SERPL-MCNC: 175 MG/DL (ref 0–200)
CO2 SERPL-SCNC: 23.7 MMOL/L (ref 22–29)
CREAT SERPL-MCNC: 1.42 MG/DL (ref 0.76–1.27)
DEPRECATED RDW RBC AUTO: 47.1 FL (ref 37–54)
EGFRCR SERPLBLD CKD-EPI 2021: 55.9 ML/MIN/1.73
EOSINOPHIL # BLD AUTO: 0 10*3/MM3 (ref 0–0.4)
EOSINOPHIL NFR BLD AUTO: 0 % (ref 0.3–6.2)
ERYTHROCYTE [DISTWIDTH] IN BLOOD BY AUTOMATED COUNT: 13.4 % (ref 12.3–15.4)
GLOBULIN UR ELPH-MCNC: 2.3 GM/DL
GLUCOSE SERPL-MCNC: 92 MG/DL (ref 65–99)
HBA1C MFR BLD: 5.9 % (ref 4.8–5.6)
HCT VFR BLD AUTO: 43.5 % (ref 37.5–51)
HDLC SERPL-MCNC: 46 MG/DL (ref 40–60)
HGB BLD-MCNC: 14.7 G/DL (ref 13–17.7)
IMM GRANULOCYTES # BLD AUTO: 0.12 10*3/MM3 (ref 0–0.05)
IMM GRANULOCYTES NFR BLD AUTO: 1.3 % (ref 0–0.5)
LDLC SERPL CALC-MCNC: 105 MG/DL (ref 0–100)
LDLC/HDLC SERPL: 2.22 {RATIO}
LYMPHOCYTES # BLD AUTO: 3.29 10*3/MM3 (ref 0.7–3.1)
LYMPHOCYTES NFR BLD AUTO: 34.3 % (ref 19.6–45.3)
MCH RBC QN AUTO: 32 PG (ref 26.6–33)
MCHC RBC AUTO-ENTMCNC: 33.8 G/DL (ref 31.5–35.7)
MCV RBC AUTO: 94.6 FL (ref 79–97)
MONOCYTES # BLD AUTO: 0.62 10*3/MM3 (ref 0.1–0.9)
MONOCYTES NFR BLD AUTO: 6.5 % (ref 5–12)
NEUTROPHILS NFR BLD AUTO: 5.53 10*3/MM3 (ref 1.7–7)
NEUTROPHILS NFR BLD AUTO: 57.6 % (ref 42.7–76)
NRBC BLD AUTO-RTO: 0 /100 WBC (ref 0–0.2)
PLATELET # BLD AUTO: 234 10*3/MM3 (ref 140–450)
PMV BLD AUTO: 10.4 FL (ref 6–12)
POTASSIUM SERPL-SCNC: 3.6 MMOL/L (ref 3.5–5.2)
PROT SERPL-MCNC: 6.2 G/DL (ref 6–8.5)
PSA SERPL-MCNC: 0.44 NG/ML (ref 0–4)
RBC # BLD AUTO: 4.6 10*6/MM3 (ref 4.14–5.8)
SODIUM SERPL-SCNC: 144 MMOL/L (ref 136–145)
TRIGL SERPL-MCNC: 135 MG/DL (ref 0–150)
VLDLC SERPL-MCNC: 24 MG/DL (ref 5–40)
WBC NRBC COR # BLD AUTO: 9.59 10*3/MM3 (ref 3.4–10.8)

## 2024-12-18 PROCEDURE — 80307 DRUG TEST PRSMV CHEM ANLYZR: CPT | Performed by: FAMILY MEDICINE

## 2024-12-18 PROCEDURE — G0103 PSA SCREENING: HCPCS | Performed by: FAMILY MEDICINE

## 2024-12-18 PROCEDURE — 85025 COMPLETE CBC W/AUTO DIFF WBC: CPT | Performed by: FAMILY MEDICINE

## 2024-12-18 PROCEDURE — 80061 LIPID PANEL: CPT | Performed by: FAMILY MEDICINE

## 2024-12-18 PROCEDURE — 99214 OFFICE O/P EST MOD 30 MIN: CPT | Performed by: FAMILY MEDICINE

## 2024-12-18 PROCEDURE — 82306 VITAMIN D 25 HYDROXY: CPT | Performed by: FAMILY MEDICINE

## 2024-12-18 PROCEDURE — 80053 COMPREHEN METABOLIC PANEL: CPT | Performed by: FAMILY MEDICINE

## 2024-12-18 PROCEDURE — 36415 COLL VENOUS BLD VENIPUNCTURE: CPT | Performed by: FAMILY MEDICINE

## 2024-12-18 PROCEDURE — 83036 HEMOGLOBIN GLYCOSYLATED A1C: CPT | Performed by: FAMILY MEDICINE

## 2024-12-18 RX ORDER — HYDROCODONE BITARTRATE AND ACETAMINOPHEN 5; 325 MG/1; MG/1
1 TABLET ORAL EVERY 8 HOURS PRN
Qty: 90 TABLET | Refills: 0 | Status: SHIPPED | OUTPATIENT
Start: 2024-12-18

## 2024-12-18 NOTE — PROGRESS NOTES
Chief Complaint  Back Pain (Follow up /No concerns/)    Subjective          Leandro Salcedo presents to [facility]    History of Present Illness     History of Present Illness  The patient is a 64-year-old male who presents today for a 3-month follow-up.    He has been managing chronic thoracic and low back pain with hydrocodone 5/325, taken every 8 hours as needed. He reports that this regimen has been effective in alleviating his pain. He is seeking a refill of this medication.    He has been adhering to his prescribed regimen of lisinopril 40 mg daily, propranolol 80 mg daily, and hydrochlorothiazide 25 mg daily. His blood pressure readings at home have been within normal limits. However, he consumed an energy drink prior to his appointment today.    He continues to take vitamin D supplements.    Supplemental Information  Since the last visit, he saw Dr. Leong and was tested positive for influenza B. He was given 5 days off. He also saw Dr. Rucker for his right shoulder and received an injection for his right shoulder as well as right de Quervain's, which has been helpful.    MEDICATIONS  Current: Hydrocodone, lisinopril, propranolol, hydrochlorothiazide, vitamin D supplementation.         Current Outpatient Medications   Medication Instructions    aspirin EC 81 MG EC tablet No dose, route, or frequency recorded.    baclofen (LIORESAL) 10 MG tablet 1 tablet, 2 Times Daily    diclofenac (VOLTAREN) 75 MG EC tablet 1 tablet, 2 Times Daily    hydroCHLOROthiazide (HYDRODIURIL) 25 MG tablet No dose, route, or frequency recorded.    HYDROcodone-acetaminophen (Norco) 5-325 MG per tablet 1 tablet, Oral, Every 8 Hours PRN    lidocaine (LIDODERM) 5 %     lidocaine (XYLOCAINE) 5 % ointment     lisinopril (PRINIVIL,ZESTRIL) 40 MG tablet No dose, route, or frequency recorded.    Nurtec 75 mg, Oral, Daily PRN    ondansetron ODT (ZOFRAN-ODT) 4 mg, Translingual, Every 8 Hours PRN    propranolol  "(INDERAL) 80 MG tablet     simvastatin (ZOCOR) 40 MG tablet     venlafaxine XR (EFFEXOR-XR) 75 MG 24 hr capsule Oral, Daily       The following portions of the patient's history were reviewed and updated as appropriate: allergies, current medications, past family history, past medical history, past social history, past surgical history, and problem list.    Objective   Vital Signs:   /80 (BP Location: Right arm, Patient Position: Sitting, Cuff Size: Adult)   Pulse 80   Temp 98.1 °F (36.7 °C) (Oral)   Ht 167.6 cm (66\")   Wt 89.9 kg (198 lb 4.8 oz)   SpO2 97%   BMI 32.01 kg/m²     BP Readings from Last 3 Encounters:   12/18/24 140/80   12/09/24 140/80   11/13/24 141/87     Wt Readings from Last 3 Encounters:   12/18/24 89.9 kg (198 lb 4.8 oz)   12/09/24 89 kg (196 lb 4.8 oz)   11/13/24 87.5 kg (193 lb)           Physical Exam  Vitals reviewed.   Constitutional:       Appearance: Normal appearance.   HENT:      Head: Normocephalic and atraumatic.      Right Ear: External ear normal.      Left Ear: External ear normal.   Eyes:      Conjunctiva/sclera: Conjunctivae normal.   Cardiovascular:      Rate and Rhythm: Normal rate and regular rhythm.      Heart sounds: No murmur heard.     No friction rub. No gallop.   Pulmonary:      Effort: Pulmonary effort is normal.      Breath sounds: Normal breath sounds. No wheezing or rhonchi.   Abdominal:      General: Bowel sounds are normal. There is no distension.      Palpations: Abdomen is soft.      Tenderness: There is no abdominal tenderness.   Skin:     General: Skin is warm and dry.   Neurological:      Mental Status: He is alert and oriented to person, place, and time.      Cranial Nerves: No cranial nerve deficit.   Psychiatric:         Mood and Affect: Mood and affect normal.         Behavior: Behavior normal.         Thought Content: Thought content normal.         Judgment: Judgment normal.            Result Review :   The following data was reviewed by: " Christofer Lagos DO on 12/18/2024:  Common labs          1/30/2024    13:42 2/29/2024    15:01 4/15/2024    15:19   Common Labs   Glucose  96  135    BUN  22  13    Creatinine  1.61  1.41    Sodium  144  139    Potassium  4.1  3.7    Chloride  108  105    Calcium  9.4  9.8    Albumin  4.1  4.4    Total Bilirubin  0.3  0.5    Alkaline Phosphatase  76  64    AST (SGOT)  17  15    ALT (SGPT)  16  28    WBC 8.22      Hemoglobin 14.8      Hematocrit 43.5      Platelets 243               Lab Results   Component Value Date    SARSANTIGEN Not Detected 12/09/2024    COVID19 Not Detected 12/08/2021    RAPFLUA Negative 11/28/2022    RAPFLUB Negative 11/28/2022    FLUAAG Not Detected 12/09/2024    FLUBAG Detected (A) 12/09/2024    RAPSCRN Negative 12/09/2024    BILIRUBINUR Negative 04/15/2024       Results  Laboratory Studies  Tested positive for influenza B.    Procedures        Assessment and Plan    Diagnoses and all orders for this visit:    1. Chronic bilateral thoracic back pain (Primary)  -     HYDROcodone-acetaminophen (Norco) 5-325 MG per tablet; Take 1 tablet by mouth Every 8 (Eight) Hours As Needed for Severe Pain.  Dispense: 90 tablet; Refill: 0    2. Chronic bilateral low back pain without sciatica  -     HYDROcodone-acetaminophen (Norco) 5-325 MG per tablet; Take 1 tablet by mouth Every 8 (Eight) Hours As Needed for Severe Pain.  Dispense: 90 tablet; Refill: 0    3. De Quervain's tenosynovitis, right    4. Right shoulder pain, unspecified chronicity    5. Primary hypertension  -     CBC & Differential  -     Comprehensive Metabolic Panel    6. Hyperlipidemia, unspecified hyperlipidemia type  -     Lipid Panel    7. Screening for prostate cancer  -     PSA Screen    8. Medication monitoring encounter  -     Drug Screen 10 With / Conf, WB    9. Vitamin D deficiency  -     Vitamin D,25-Hydroxy    10. Abnormal glucose  -     Hemoglobin A1c        Assessment & Plan  1. Chronic thoracic and low back pain.  A  prescription for hydrocodone 5/325 mg, to be taken every 8 hours as needed, will be sent to [facility].    2. Hypertension.  His blood pressure is slightly elevated today. He is currently taking lisinopril 40 mg daily, propranolol 80 mg daily, and hydrochlorothiazide 25 mg daily. He mentioned consuming an energy drink, which may have contributed to the elevated reading. He is advised to avoid energy drinks as they can raise blood pressure.    3. Vitamin D deficiency.  A vitamin D level test will be conducted today. He is currently on vitamin D supplementation.    4. Health maintenance.  A lipid panel and prostate cancer screening will be performed today. A drug test will also be conducted.     See note for indication of controlled substance.  Reji and drug screen have been reviewed.  Controlled substance agreement signed and scanned into chart.  After discussion of risk and benefits of medication, I have determined the patient is suitable for Rx while demonstrating the ability to safely follow and administer the medication plan.  Patient understands expectation that medication directions cannot be adjusted without a providers written approval.    Follow-up  The patient will follow up in 3 months.    PROCEDURE  The patient received an injection for his right shoulder and right de Quervain's.       There are no discontinued medications.       Follow Up   Return in about 3 months (around 3/18/2025) for Hypertension.  Patient was given instructions and counseling regarding his condition or for health maintenance advice. Please see specific information pulled into the AVS if appropriate.     Patient or patient representative verbalized consent for the use of Ambient Listening during the visit with  Christofer Lagos DO for chart documentation. 12/18/2024  14:35 SARKIS Lagos DO  12/18/24  14:43 EST

## 2024-12-26 LAB
AMPHETAMINES BLD QL SCN: NEGATIVE NG/ML
BARBITURATES SERPLBLD QL: NEGATIVE UG/ML
BENZODIAZ BLD QL: NEGATIVE NG/ML
CANNABINOIDS BLD QL SCN: NEGATIVE NG/ML
COCAINE+BZE SERPLBLD QL SCN: NEGATIVE NG/ML
METHADONE BLD QL SCN: NEGATIVE NG/ML
OPIATES BLD QL SCN: NEGATIVE NG/ML
OXYCODONE BLD QL SCN: NEGATIVE NG/ML
PCP BLD QL SCN: NEGATIVE NG/ML
PROPOXYPH BLD QL SCN: NEGATIVE NG/ML

## 2025-01-20 ENCOUNTER — TELEPHONE (OUTPATIENT)
Dept: FAMILY MEDICINE CLINIC | Facility: CLINIC | Age: 65
End: 2025-01-20
Payer: OTHER GOVERNMENT

## 2025-01-20 NOTE — TELEPHONE ENCOUNTER
DR NETTLES OFFICE CALLED SATED PT DOES NOT HAVE A SCHEDULED APPT WITH  PROVIDER. CALL BACK NUMBER [phone]

## 2025-01-23 DIAGNOSIS — M54.50 CHRONIC BILATERAL LOW BACK PAIN WITHOUT SCIATICA: ICD-10-CM

## 2025-01-23 DIAGNOSIS — G89.29 CHRONIC BILATERAL LOW BACK PAIN WITHOUT SCIATICA: ICD-10-CM

## 2025-01-23 DIAGNOSIS — G89.29 CHRONIC BILATERAL THORACIC BACK PAIN: ICD-10-CM

## 2025-01-23 DIAGNOSIS — M54.6 CHRONIC BILATERAL THORACIC BACK PAIN: ICD-10-CM

## 2025-01-23 NOTE — TELEPHONE ENCOUNTER
Caller: Matias Leandro    Relationship: Self    Best call back number: [phone]    Requested Prescriptions:   Requested Prescriptions     Pending Prescriptions Disp Refills    HYDROcodone-acetaminophen (Norco) 5-325 MG per tablet 90 tablet 0     Sig: Take 1 tablet by mouth Every 8 (Eight) Hours As Needed for Severe Pain.        Pharmacy where request should be sent: [id number] - [place], [place] - [address] [facility] AT [facility] [address] /[facility] [place] [facility] [place] - [phone] [facility] [phone] FX     Last office visit with prescribing clinician: 12/18/2024   Last telemedicine visit with prescribing clinician: Visit date not found   Next office visit with prescribing clinician: 3/31/2025     Additional details provided by patient: PATIENT HAS THREE DAYS OF MEDICATION LEFT. PLEASE SEND NEW PRESCRIPTION TO PHARMACY ASAP.    Does the patient have less than a 3 day supply:  [] Yes  [x] No    Would you like a call back once the refill request has been completed: [] Yes [] No    If the office needs to give you a call back, can they leave a voicemail: [] Yes [] No    Brien Delarosa Rep   01/23/25 16:10 EST

## 2025-01-24 RX ORDER — HYDROCODONE BITARTRATE AND ACETAMINOPHEN 5; 325 MG/1; MG/1
1 TABLET ORAL EVERY 8 HOURS PRN
Qty: 90 TABLET | Refills: 0 | Status: SHIPPED | OUTPATIENT
Start: 2025-01-24

## 2025-02-03 ENCOUNTER — PRIOR AUTHORIZATION (OUTPATIENT)
Dept: NEUROLOGY | Facility: CLINIC | Age: 65
End: 2025-02-03
Payer: OTHER GOVERNMENT

## 2025-02-04 ENCOUNTER — TELEPHONE (OUTPATIENT)
Dept: NEUROLOGY | Facility: CLINIC | Age: 65
End: 2025-02-04
Payer: OTHER GOVERNMENT

## 2025-02-04 NOTE — TELEPHONE ENCOUNTER
The [facility] received a fax that requires your attention. The document has been indexed to the patient’s chart for your review.      Reason for sending: NOTIFICATION OF NEW RECORDS    Documents Description: REQUEST FOR INFO 02/03/25    Name of Sender: SHAQUILLE GAXIOLA    Date Indexed: 02/04/25    Notes (if needed): FOR REVIEW

## 2025-02-05 ENCOUNTER — TELEPHONE (OUTPATIENT)
Dept: FAMILY MEDICINE CLINIC | Facility: CLINIC | Age: 65
End: 2025-02-05
Payer: OTHER GOVERNMENT

## 2025-02-05 NOTE — TELEPHONE ENCOUNTER
Caller: Leandro Salcedo    Relationship: Self    Best call back number:  OFFICE NUMBER     What is the medical concern/diagnosis: BOTOX INJECTIONS    What specialty or service is being requested: NEUROLOGY     What is the provider, practice or medical service name: [facility]     What is the office location: N/A    What is the office phone number: N/A     Any additional details:       THE PATIENT SAID THE [facility]. HE SAID HE WILL BE NEEDING TO GO THROUGH [facility]     THE PATIENT HAS AN APPOINTMENT SCHEDULED FRIDAY

## 2025-03-03 DIAGNOSIS — G89.29 CHRONIC BILATERAL THORACIC BACK PAIN: ICD-10-CM

## 2025-03-03 DIAGNOSIS — G89.29 CHRONIC BILATERAL LOW BACK PAIN WITHOUT SCIATICA: ICD-10-CM

## 2025-03-03 DIAGNOSIS — M54.6 CHRONIC BILATERAL THORACIC BACK PAIN: ICD-10-CM

## 2025-03-03 DIAGNOSIS — M54.50 CHRONIC BILATERAL LOW BACK PAIN WITHOUT SCIATICA: ICD-10-CM

## 2025-03-03 RX ORDER — HYDROCODONE BITARTRATE AND ACETAMINOPHEN 5; 325 MG/1; MG/1
1 TABLET ORAL EVERY 8 HOURS PRN
Qty: 90 TABLET | Refills: 0 | Status: SHIPPED | OUTPATIENT
Start: 2025-03-03

## 2025-03-07 ENCOUNTER — TELEPHONE (OUTPATIENT)
Dept: NEUROLOGY | Facility: CLINIC | Age: 65
End: 2025-03-07
Payer: OTHER GOVERNMENT

## 2025-03-07 NOTE — TELEPHONE ENCOUNTER
Caller: Leandro Salcedo    Relationship: Self    Best call back number: [phone] BEFORE 4PM [phone] AFTER 4PM    Requested Prescriptions: SUMATRIPTAN/IMITREX- UNABLE TO ADD VIA SMARTLINK AS MEDICATION IS NOT LISTED AS ACTIVE IN PT'S CHART.     Pharmacy where request should be sent: [facility] - [address] [phone] [facility] [phone]      Last office visit with prescribing clinician: 8/7/2024   Last telemedicine visit with prescribing clinician: Visit date not found   Next office visit with prescribing clinician: Visit date not found     Additional details provided by patient: PT REPORTS HE HAS JUST 1 IMITREX TABLET LEFT.    Does the patient have less than a 3 day supply?:  [x] Yes  [] No    Would you like a call back once the refill request has been completed?: [] Yes  [x] No    If the office needs to give you a call back, can they leave a voicemail?: [] Yes  [x] No    PLEASE REVIEW AND ADVISE.    Brien Akbar Rep   03/07/25 08:13 EST

## 2025-03-07 NOTE — TELEPHONE ENCOUNTER
Caller: Leandro Salcedo    Relationship to patient: Self    Best call back number: [phone] BEFORE 4PM    Chief complaint: PT REQUESTING TO RESCHEDULE HIS MISSED BOTOX APPT W/ RODY BLACKWELL ON 2/7/2025. [facility]ING [facility] TO [employer] AND WILL NEED NEW PRIOR AUTHORIZATION FOR BOTOX.    Type of visit: BOTOX    If rescheduling, when is the original appointment: 2/7/2025 @ 8:30AM    Requested date: NEXT AVAILABLE     Additional notes: NEW INSURANCE HAS BEEN UPDATED IN PT'S REGISTRATION.     PLEASE REVIEW AND ADVISE.

## 2025-03-11 ENCOUNTER — OFFICE VISIT (OUTPATIENT)
Dept: FAMILY MEDICINE CLINIC | Facility: CLINIC | Age: 65
End: 2025-03-11
Payer: OTHER GOVERNMENT

## 2025-03-11 VITALS
TEMPERATURE: 98.2 F | HEIGHT: 66 IN | SYSTOLIC BLOOD PRESSURE: 136 MMHG | WEIGHT: 189.8 LBS | DIASTOLIC BLOOD PRESSURE: 78 MMHG | HEART RATE: 66 BPM | OXYGEN SATURATION: 98 % | BODY MASS INDEX: 30.5 KG/M2

## 2025-03-11 DIAGNOSIS — G47.00 INSOMNIA, UNSPECIFIED TYPE: ICD-10-CM

## 2025-03-11 DIAGNOSIS — G43.109 MIGRAINE WITH AURA AND WITHOUT STATUS MIGRAINOSUS, NOT INTRACTABLE: Primary | ICD-10-CM

## 2025-03-11 RX ORDER — PROMETHAZINE HYDROCHLORIDE 25 MG/1
25 TABLET ORAL EVERY 6 HOURS PRN
Qty: 10 TABLET | Refills: 0 | Status: SHIPPED | OUTPATIENT
Start: 2025-03-11

## 2025-03-11 RX ORDER — KETOROLAC TROMETHAMINE 30 MG/ML
30 INJECTION, SOLUTION INTRAMUSCULAR; INTRAVENOUS EVERY 6 HOURS PRN
Status: DISCONTINUED | OUTPATIENT
Start: 2025-03-11 | End: 2025-03-11

## 2025-03-11 RX ORDER — KETOROLAC TROMETHAMINE 30 MG/ML
30 INJECTION, SOLUTION INTRAMUSCULAR; INTRAVENOUS ONCE
Status: COMPLETED | OUTPATIENT
Start: 2025-03-11 | End: 2025-03-11

## 2025-03-11 RX ADMIN — KETOROLAC TROMETHAMINE 30 MG: 30 INJECTION, SOLUTION INTRAMUSCULAR; INTRAVENOUS at 17:15

## 2025-03-11 NOTE — PROGRESS NOTES
"Chief Complaint  Migraine and Insomnia    Subjective      Leandro Salcedo is a 65 y.o. male who presents to [facility]     History of Present Illness  The patient is a 65-year-old male who presents today with a migraine.    He has been experiencing insomnia for the past 3 days, which he suspects may be contributing to his current migraine episode. This is his first encounter with sleep disturbances. 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 He has not previously sought pharmacological intervention for sleep. He has attempted to alleviate his migraine symptoms with Nurtec, which he took this morning, resulting in some relief of pressure. He also uses Norco for back pain, but only as needed, and has not required it today. He prefers to avoid frequent use of this medication. He has previously used Phenergan for nausea associated with migraines but does not recall its sedative effects. He discontinued baby aspirin approximately a month ago due to the development of bruises. He uses Voltaren on an as-needed basis and has not had any today, no other NSAIDs taken today. His current migraine is accompanied by an aura on the left side, but he reports no visual disturbances. He has abstained from soda and caffeine. He experiences photophobia but reports no phonophobia or osmophobia. He retains full mobility of his neck without any associated tenderness. He has previously received Toradol injections.    MEDICATIONS  - Current: Nurtec  - Current: Norco  - Current: Effexor  - Current: Lisinopril  - Current: Voltaren (as needed)  - Discontinued: baby aspirin        Patient Care Team:  Christofer Lagos DO as PCP - General (Family Medicine)    Objective   Vital Signs:   Vitals:    03/11/25 1434   BP: 136/78   Pulse: 66   Temp: 98.2 °F (36.8 °C)   SpO2: 98%   Weight: 86.1 kg (189 lb 12.8 oz)   Height: 167.6 cm (66\")     Body mass " index is 30.63 kg/m².    Wt Readings from Last 3 Encounters:   03/11/25 86.1 kg (189 lb 12.8 oz)   12/18/24 89.9 kg (198 lb 4.8 oz)   12/09/24 89 kg (196 lb 4.8 oz)     BP Readings from Last 3 Encounters:   03/11/25 136/78   12/18/24 140/80   12/09/24 140/80       Health Maintenance   Topic Date Due    ZOSTER VACCINE (1 of 2) Never done    Pneumococcal Vaccine 50+ (2 of 2 - PCV) 09/23/2015    ANNUAL PHYSICAL  Never done    BMI FOLLOWUP  02/28/2025    LIPID PANEL  12/18/2025    COLORECTAL CANCER SCREENING  04/18/2027    TDAP/TD VACCINES (3 - Td or Tdap) 11/29/2033    HEPATITIS C SCREENING  Completed    COVID-19 Vaccine  Completed    INFLUENZA VACCINE  Completed       Lab Results (last 24 hours)       ** No results found for the last 24 hours. **               Physical Exam  Vitals and nursing note reviewed.   Constitutional:       General: He is not in acute distress.     Appearance: Normal appearance. He is obese. He is not ill-appearing, toxic-appearing or diaphoretic.   HENT:      Head: Normocephalic and atraumatic.   Eyes:      General:         Right eye: No discharge.         Left eye: No discharge.      Extraocular Movements: Extraocular movements intact.      Conjunctiva/sclera: Conjunctivae normal.      Pupils: Pupils are equal, round, and reactive to light.   Cardiovascular:      Rate and Rhythm: Normal rate and regular rhythm.      Heart sounds: Normal heart sounds.   Pulmonary:      Effort: Pulmonary effort is normal.      Breath sounds: Normal breath sounds.   Skin:     General: Skin is warm and dry.   Neurological:      General: No focal deficit present.      Mental Status: He is alert and oriented to person, place, and time.   Psychiatric:         Mood and Affect: Mood normal.         Behavior: Behavior normal.          Physical Exam  General Appearance: Normal.  Vital signs: Within normal limits.  HEENT: Within normal limits.  Respiratory: Lungs were auscultated.  Cardiovascular: Heart was  examined.  Skin: Warm and dry, no rash.  Neurological: Normal.  Psychiatric:   Other observations: Eyes were examined.      Result Review   The following data was reviewed by: Stacey Leong MD on 03/11/2025:  [x]  Tests & Results  []  Hospitalization/Emergency Department/Urgent Care  []  Internal/External Consultant Notes    Results        Procedures          ASSESSMENT/PLAN  Diagnoses and all orders for this visit:    1. Migraine with aura and without status migrainosus, not intractable (Primary)  -     promethazine (PHENERGAN) 25 MG tablet; Take 1 tablet by mouth Every 6 (Six) Hours As Needed for Nausea or Vomiting.  Dispense: 10 tablet; Refill: 0  -     Discontinue: ketorolac (TORADOL) injection 30 mg  -     ketorolac (TORADOL) injection 30 mg    2. Insomnia, unspecified type  -     promethazine (PHENERGAN) 25 MG tablet; Take 1 tablet by mouth Every 6 (Six) Hours As Needed for Nausea or Vomiting.  Dispense: 10 tablet; Refill: 0        Assessment & Plan  1. Migraine.  His insomnia may be a potential trigger for his current migraine episode. He has not consumed any anti-inflammatory medications today, and his blood pressure is within normal limits. A prescription for Phenergan 25 mg, with the option to increase the dosage to 50 mg if necessary, will be provided. A Toradol injection of 30 mg will be administered in the clinic today. He is advised to abstain from Voltaren use. A work note will be issued for him.               Leandro Salcedo  reports that he has never smoked. He has never been exposed to tobacco smoke. He has never used smokeless tobacco.              FOLLOW UP  No follow-ups on file.  Patient was given instructions and counseling regarding his condition or for health maintenance advice. Please see specific information pulled into the AVS if appropriate.       Stacey Leong MD  03/11/25  18:17 EDT    Part of this note may be an electronic transcription/translation of spoken language to printed  text using the Dragon Dictation System.    Patient or patient representative verbalized consent for the use of Ambient Listening during the visit with  Stacey Leong MD for chart documentation. 3/11/2025  15:08 EDT

## 2025-03-11 NOTE — LETTER
March 11, 2025     Patient: Leandro Salcedo   YOB: 1960   Date of Visit: 3/11/2025       To Whom It May Concern:    It is my medical opinion that Leandro Salcedo may return to work on 3/13/25.             Sincerely,        Stacey Leong MD    CC: No Recipients

## 2025-03-14 ENCOUNTER — TELEPHONE (OUTPATIENT)
Dept: NEUROLOGY | Facility: CLINIC | Age: 65
End: 2025-03-14

## 2025-03-14 NOTE — TELEPHONE ENCOUNTER
[facility] received a fax that requires your attention. The document has been indexed to the patient’s chart for your review.    Reason for sending: MEDICAL RECORDS NOTIFICATION    Documents Description: UPDATED NEUROLOGY REFERRAL_[facility]_03/04/25    Name of Sender: OSVALDO    Date Indexed: 03/14/25    Notes (if needed): AUTH# [id number]

## 2025-03-24 ENCOUNTER — TELEPHONE (OUTPATIENT)
Dept: NEUROLOGY | Facility: CLINIC | Age: 65
End: 2025-03-24
Payer: OTHER GOVERNMENT

## 2025-03-24 NOTE — TELEPHONE ENCOUNTER
Caller: Leandro Salcedo    Relationship to patient: Self    Best call back number: BEFORE 4 PM CALL [phone]  AFTER 4 CALL [phone]     Chief complaint: MIGRAINES    Type of visit: BOTOX    Requested date: FIRST AVAILABLE     If rescheduling, when is the original appointment: N/A     Additional notes:PT [facility]CE[facility] ON SATURDAY IN THE MAIL AND WANTS TO GET BACK ON THE SCHEDULE

## 2025-03-26 ENCOUNTER — TELEPHONE (OUTPATIENT)
Dept: NEUROLOGY | Facility: CLINIC | Age: 65
End: 2025-03-26
Payer: OTHER GOVERNMENT

## 2025-03-26 NOTE — TELEPHONE ENCOUNTER
Called patient, person who answered said he wasn't available at this time, he will have him call us back to schedule.

## 2025-03-26 NOTE — TELEPHONE ENCOUNTER
I called patient back, no answer LVM, patient is scheduled for 5/9 @ 1pm. Hub okay to give him appt date and time. That is the next available date and time. We have nothing sooner.

## 2025-03-26 NOTE — TELEPHONE ENCOUNTER
Caller: DALY MIN     Relationship: SELF     Best call back number: [phone] UNTIL 9:30 AM AND AFTER THAT PLEASE       Telephone Information:   Mobile [phone]      What is the best time to reach you:      Who are you requesting to speak with (clinical staff, provider,  specific staff member):   LINDA     What was the call regarding: UNABLE TO WARM TRANSFER CALL TO OFFICE.     PLEASE CALL PT BACK TO SCHEDULE BOTOX APPT

## 2025-03-26 NOTE — TELEPHONE ENCOUNTER
Caller: DALY MIN    Relationship: SELF    Best call back number: [phone] UNTIL 9:30 AM AND AFTER THAT PLEASE   Telephone Information:   Mobile [phone]      What is the best time to reach you:     Who are you requesting to speak with (clinical staff, provider,  specific staff member):   LINDA    What was the call regarding: UNABLE TO WARM TRANSFER CALL TO OFFICE.    PLEASE CALL PT BACK TO SCHEDULE BOTOX APPT

## 2025-03-31 ENCOUNTER — OFFICE VISIT (OUTPATIENT)
Dept: FAMILY MEDICINE CLINIC | Facility: CLINIC | Age: 65
End: 2025-03-31
Payer: OTHER GOVERNMENT

## 2025-03-31 VITALS
WEIGHT: 191.2 LBS | OXYGEN SATURATION: 96 % | HEART RATE: 59 BPM | BODY MASS INDEX: 30.73 KG/M2 | HEIGHT: 66 IN | DIASTOLIC BLOOD PRESSURE: 98 MMHG | TEMPERATURE: 98.6 F | SYSTOLIC BLOOD PRESSURE: 130 MMHG

## 2025-03-31 DIAGNOSIS — G43.719 INTRACTABLE CHRONIC MIGRAINE WITHOUT AURA AND WITHOUT STATUS MIGRAINOSUS: Primary | ICD-10-CM

## 2025-03-31 DIAGNOSIS — G89.29 CHRONIC BILATERAL THORACIC BACK PAIN: ICD-10-CM

## 2025-03-31 DIAGNOSIS — M54.50 CHRONIC BILATERAL LOW BACK PAIN WITHOUT SCIATICA: ICD-10-CM

## 2025-03-31 DIAGNOSIS — G47.00 INSOMNIA, UNSPECIFIED TYPE: ICD-10-CM

## 2025-03-31 DIAGNOSIS — G89.29 CHRONIC BILATERAL LOW BACK PAIN WITHOUT SCIATICA: ICD-10-CM

## 2025-03-31 DIAGNOSIS — M54.6 CHRONIC BILATERAL THORACIC BACK PAIN: ICD-10-CM

## 2025-03-31 DIAGNOSIS — I10 PRIMARY HYPERTENSION: ICD-10-CM

## 2025-03-31 PROCEDURE — 99214 OFFICE O/P EST MOD 30 MIN: CPT | Performed by: FAMILY MEDICINE

## 2025-03-31 RX ORDER — VENLAFAXINE HYDROCHLORIDE 75 MG/1
75 CAPSULE, EXTENDED RELEASE ORAL DAILY
Qty: 90 CAPSULE | Refills: 1 | Status: SHIPPED | OUTPATIENT
Start: 2025-03-31

## 2025-03-31 RX ORDER — HYDROCODONE BITARTRATE AND ACETAMINOPHEN 5; 325 MG/1; MG/1
1 TABLET ORAL EVERY 8 HOURS PRN
Qty: 90 TABLET | Refills: 0 | Status: SHIPPED | OUTPATIENT
Start: 2025-03-31

## 2025-03-31 RX ORDER — QUETIAPINE FUMARATE 25 MG/1
25 TABLET, FILM COATED ORAL NIGHTLY
Qty: 30 TABLET | Refills: 1 | Status: SHIPPED | OUTPATIENT
Start: 2025-03-31

## 2025-03-31 NOTE — PROGRESS NOTES
Chief Complaint  insomnia    Subjective          Leandro Salcedo presents to [facility]    History of Present Illness     History of Present Illness  The patient is a 65-year-old male who presents today for a follow-up.    He has been experiencing insomnia for the past 5 weeks, which he attributes to stress. He has difficulty initiating sleep and has not been prescribed any sleep aids. He consulted Dr. Leong regarding his insomnia and was prescribed Phenergan, which proved ineffective even after increasing the dosage from one to two tablets. He has also tried Zofran without success.    His migraines are managed by Jolynn Moreno, with whom he has an appointment for an injection in April 2025. He experiences 4 to 5 migraine episodes per month and is considering resuming Effexor to potentially reduce the frequency of these episodes. He continues to take propranolol for his migraines. He is no longer on Effexor, the reason for which he does not recall. He reports no symptoms of anxiety or depression.    He has discontinued lisinopril for the past 3 weeks due to weight loss and a decrease in blood pressure. He is currently on hydrochlorothiazide 25 mg and propranolol 160 mg.    He is requesting a refill of [place] for his chronic thoracic and low back pain.    MEDICATIONS  Current: Norco, propranolol, hydrochlorothiazide  Discontinued: Effexor, lisinopril         Current Outpatient Medications   Medication Instructions    baclofen (LIORESAL) 10 MG tablet 1 tablet, 2 Times Daily    diclofenac (VOLTAREN) 75 MG EC tablet 1 tablet, 2 Times Daily    hydroCHLOROthiazide (HYDRODIURIL) 25 MG tablet No dose, route, or frequency recorded.    HYDROcodone-acetaminophen (Norco) 5-325 MG per tablet 1 tablet, Oral, Every 8 Hours PRN    lidocaine (LIDODERM) 5 %     lidocaine (XYLOCAINE) 5 % ointment     lisinopril (PRINIVIL,ZESTRIL) 40 MG tablet No dose, route, or frequency recorded.    naloxone (NARCAN) 4  "MG/0.1ML nasal spray CALL 911. SPR CONTENTS OF ONE SPRAYER (0.1ML) INTO ONE NOSTRIL. REPEAT IN 2-3 MIN IF SYMPTOMS OF OPIOID EMERGENCY PERSIST, ALTERNATE NOSTRILS    Nurtec 75 mg, Oral, Daily PRN    ondansetron ODT (ZOFRAN-ODT) 4 mg, Translingual, Every 8 Hours PRN    propranolol (INDERAL) 80 MG tablet     QUEtiapine (SEROQUEL) 25 mg, Oral, Nightly    simvastatin (ZOCOR) 40 MG tablet     venlafaxine XR (EFFEXOR-XR) 75 mg, Oral, Daily       The following portions of the patient's history were reviewed and updated as appropriate: allergies, current medications, past family history, past medical history, past social history, past surgical history, and problem list.    Objective   Vital Signs:   /98   Pulse 59   Temp 98.6 °F (37 °C) (Oral)   Ht 167.6 cm (66\")   Wt 86.7 kg (191 lb 3.2 oz)   SpO2 96%   BMI 30.86 kg/m²     BP Readings from Last 3 Encounters:   03/31/25 130/98   03/11/25 136/78   12/18/24 140/80     Wt Readings from Last 3 Encounters:   03/31/25 86.7 kg (191 lb 3.2 oz)   03/11/25 86.1 kg (189 lb 12.8 oz)   12/18/24 89.9 kg (198 lb 4.8 oz)     BMI is >= 30 and <35. (Class 1 Obesity). The following options were offered after discussion;: exercise counseling/recommendations and nutrition counseling/recommendations     Physical Exam  Vitals reviewed.   Constitutional:       Appearance: Normal appearance.   HENT:      Head: Normocephalic and atraumatic.      Right Ear: External ear normal.      Left Ear: External ear normal.   Eyes:      Conjunctiva/sclera: Conjunctivae normal.   Cardiovascular:      Rate and Rhythm: Normal rate and regular rhythm.      Heart sounds: No murmur heard.     No friction rub. No gallop.   Pulmonary:      Effort: Pulmonary effort is normal.      Breath sounds: Normal breath sounds. No wheezing or rhonchi.   Abdominal:      General: Bowel sounds are normal. There is no distension.      Palpations: Abdomen is soft.      Tenderness: There is no abdominal tenderness.   Skin:   "   General: Skin is warm and dry.   Neurological:      Mental Status: He is alert and oriented to person, place, and time.      Cranial Nerves: No cranial nerve deficit.   Psychiatric:         Mood and Affect: Mood and affect normal.         Behavior: Behavior normal.         Thought Content: Thought content normal.         Judgment: Judgment normal.            Result Review :   The following data was reviewed by: Christofer Lagos DO on 03/31/2025:  Common labs          4/15/2024    15:19 12/18/2024    14:53   Common Labs   Glucose 135  92    BUN 13  28    Creatinine 1.41  1.42    Sodium 139  144    Potassium 3.7  3.6    Chloride 105  111    Calcium 9.8  9.4    Albumin 4.4  3.9    Total Bilirubin 0.5  0.3    Alkaline Phosphatase 64  64    AST (SGOT) 15  23    ALT (SGPT) 28  23    WBC  9.59    Hemoglobin  14.7    Hematocrit  43.5    Platelets  234    Total Cholesterol  175    Triglycerides  135    HDL Cholesterol  46    LDL Cholesterol   105    Hemoglobin A1C  5.90    PSA  0.443             Lab Results   Component Value Date    SARSANTIGEN Not Detected 12/09/2024    COVID19 Not Detected 12/08/2021    RAPFLUA Negative 11/28/2022    RAPFLUB Negative 11/28/2022    FLUAAG Not Detected 12/09/2024    FLUBAG Detected (A) 12/09/2024    RAPSCRN Negative 12/09/2024    BILIRUBINUR Negative 04/15/2024       Results      Procedures        Assessment and Plan    Diagnoses and all orders for this visit:    1. Intractable chronic migraine without aura and without status migrainosus (Primary)    2. Chronic bilateral thoracic back pain  -     HYDROcodone-acetaminophen (Norco) 5-325 MG per tablet; Take 1 tablet by mouth Every 8 (Eight) Hours As Needed for Severe Pain.  Dispense: 90 tablet; Refill: 0    3. Chronic bilateral low back pain without sciatica  -     HYDROcodone-acetaminophen (Norco) 5-325 MG per tablet; Take 1 tablet by mouth Every 8 (Eight) Hours As Needed for Severe Pain.  Dispense: 90 tablet; Refill: 0    4. Primary  hypertension    5. Insomnia, unspecified type    Other orders  -     venlafaxine XR (EFFEXOR-XR) 75 MG 24 hr capsule; Take 1 capsule by mouth Daily.  Dispense: 90 capsule; Refill: 1  -     QUEtiapine (SEROquel) 25 MG tablet; Take 1 tablet by mouth Every Night.  Dispense: 30 tablet; Refill: 1        Assessment & Plan  1. Chronic thoracic and low back pain.  A prescription for [place] will be sent to [facility].    2. Insomnia.  He has been experiencing insomnia for about 5 weeks, possibly due to stress. Phenergan was previously tried but was ineffective. A prescription for Seroquel 25 mg will be provided to help with sleep.    3. Migraine.  He experiences 4-5 migraine headaches per month. Effexor will be reintroduced for migraine prevention. A prescription for Effexor will be sent to [facility]. He will continue taking propranolol 160 mg for hypertension and migraine control.    4. Hypertension.  He will continue his current regimen of hydrochlorothiazide 25 mg and lisinopril 40 mg daily. He is advised to resume taking lisinopril as previously prescribed.       See note for indication of controlled substance.  Reji and drug screen have been reviewed.  Controlled substance agreement signed and scanned into chart.  After discussion of risk and benefits of medication, I have determined the patient is suitable for Rx while demonstrating the ability to safely follow and administer the medication plan.  Patient understands expectation that medication directions cannot be adjusted without a providers written approval.    Follow-up  The patient will follow up in 1 month.       Medications Discontinued During This Encounter   Medication Reason    HYDROcodone-acetaminophen (Norco) 5-325 MG per tablet Reorder    venlafaxine XR (EFFEXOR-XR) 75 MG 24 hr capsule Reorder    promethazine (PHENERGAN) 25 MG tablet           Follow Up   Return in about 1 month (around 4/30/2025) for insomnia.  Patient was given instructions and  counseling regarding his condition or for health maintenance advice. Please see specific information pulled into the AVS if appropriate.     Patient or patient representative verbalized consent for the use of Ambient Listening during the visit with  Christofer Lagos DO for chart documentation. 3/31/2025  17:01 EDT    Christofer Lagos DO  03/31/25  17:01 EDT

## 2025-04-04 NOTE — TELEPHONE ENCOUNTER
Caller: Leandro Salcedo    Relationship: Self    Best call back number: [phone]    Requested Prescriptions:   Requested Prescriptions     Pending Prescriptions Disp Refills    SUMAtriptan (IMITREX) 100 MG tablet       Sig: Take 1 tablet by mouth See Admin Instructions.        Pharmacy where request should be sent: [address] [phone] [facility] [phone]      Last office visit with prescribing clinician: 8/7/2024   Last telemedicine visit with prescribing clinician: Visit date not found   Next office visit with prescribing clinician: 4/21/2025     Additional details provided by patient: OUT OF SCRIPT    Does the patient have less than a 3 day supply:  [x] Yes  [] No    Would you like a call back once the refill request has been completed: [] Yes [x] No    If the office needs to give you a call back, can they leave a voicemail: [] Yes [x] No    Brien Adams Rep   04/04/25 08:34 EDT

## 2025-04-14 ENCOUNTER — TELEPHONE (OUTPATIENT)
Dept: NEUROLOGY | Facility: CLINIC | Age: 65
End: 2025-04-14
Payer: OTHER GOVERNMENT

## 2025-04-14 RX ORDER — SUMATRIPTAN SUCCINATE 100 MG/1
100 TABLET ORAL SEE ADMIN INSTRUCTIONS
Qty: 9 TABLET | Refills: 0 | Status: SHIPPED | OUTPATIENT
Start: 2025-04-14

## 2025-04-14 NOTE — TELEPHONE ENCOUNTER
Caller: DALY Prince call back number: [phone]     Requested Prescriptions: IMITREX DONT SEE IN CHART?   Requested Prescriptions      No prescriptions requested or ordered in this encounter        Pharmacy where request should be sent:     [facility] [address] - [phone]  - [phone]  [phone]        Last office visit with prescribing clinician: 8/7/2024   Last telemedicine visit with prescribing clinician: Visit date not found   Next office visit with prescribing clinician: 4/21/2025     Additional details provided by patient: PT IS OUT OF RX IMITREX?     Does the patient have less than a 3 day supply:  [x] Yes  [] No    Would you like a call back once the refill request has been completed: [] Yes [] No    If the office needs to give you a call back, can they leave a voicemail: [] Yes [] No    Brien Harris   04/14/25 09:05 EDT

## 2025-04-15 ENCOUNTER — TELEPHONE (OUTPATIENT)
Dept: NEUROLOGY | Facility: CLINIC | Age: 65
End: 2025-04-15

## 2025-04-15 RX ORDER — SUMATRIPTAN SUCCINATE 100 MG/1
TABLET ORAL
Qty: 12 TABLET | Refills: 5 | Status: SHIPPED | OUTPATIENT
Start: 2025-04-15

## 2025-04-15 NOTE — TELEPHONE ENCOUNTER
Pharmacy Name: [facility] [place] [address] [facility] [phone] [facility] [phone]      Pharmacy representative name: NARGIS    Pharmacy representative phone number: [phone]    What medication are you calling in regards to: SUMATRIPTAN    What question does the pharmacy have: NARGIS CALLED WITH REQUEST FOR CLARIFICATION ON THE DIRECTIONS OF THE SUMATRIPTAN RX THAT WAS SENT OVER TO THEM YESTERDAY.    Who is the provider that prescribed the medication: RODY BLACKWELL    PLEASE REVIEW AND ADVISE.

## 2025-04-16 ENCOUNTER — TELEPHONE (OUTPATIENT)
Dept: NEUROLOGY | Facility: CLINIC | Age: 65
End: 2025-04-16
Payer: OTHER GOVERNMENT

## 2025-04-16 NOTE — TELEPHONE ENCOUNTER
"Caller: Leandro Salcedo    Relationship: Self    Best call back number: [phone]    What was the call regarding: PT STATES HE USUALLY TAKES IMITREX FOR HIS HEADACHES, HOWEVER, WHEN HE WENT TO  HIS RX FROM HIS PHARMACY, HE WAS PROVIDED SUMATRIPTAN. I EXPLAINED TO PT THAT SUMATRIPTAN IS THE GENERIC FORM OF IMITREX. PT STATES THE SUMATRIPTAN HAS NOT BEEN WORKING FOR HIM AND THAT HE HAS HAD A HEADACHE EVERY DAY FOR THE PAST 5 DAYS AND HE HAS BEEN TAKING 2-3 SUMATRIPTAN TABLETS DAILY. HE STATES THE INSTRUCTIONS ON HIS BOTTLE STATE \"TAKE ONE TABLET AT ONSET OF HEADACHE. MAY REPEAT DOSE OF EVERY TWO HOURS\".    SEEING AS THIS DIFFERS FROM THE RX IN PT'S CHART, CALL WARM-TRANSFERRED TO [facility], IN OFFICE, FOR FURTHER TRIAGING.    DOCUMENTING PER PROTOCOL.  "

## 2025-04-16 NOTE — TELEPHONE ENCOUNTER
Spoke with patient and explained that sumatriptan is generic for imitrex and the directions state he can repeat dose one time in 2 hours if headache is not resolving. Patient verbalized understanding and will let us know if the sumatriptan does not work for him.

## 2025-04-21 ENCOUNTER — PROCEDURE VISIT (OUTPATIENT)
Dept: NEUROLOGY | Facility: CLINIC | Age: 65
End: 2025-04-21
Payer: OTHER GOVERNMENT

## 2025-04-21 DIAGNOSIS — G43.719 INTRACTABLE CHRONIC MIGRAINE WITHOUT AURA AND WITHOUT STATUS MIGRAINOSUS: Primary | ICD-10-CM

## 2025-04-21 PROCEDURE — 64615 CHEMODENERV MUSC MIGRAINE: CPT | Performed by: NURSE PRACTITIONER

## 2025-04-23 ENCOUNTER — OFFICE VISIT (OUTPATIENT)
Dept: FAMILY MEDICINE CLINIC | Facility: CLINIC | Age: 65
End: 2025-04-23
Payer: OTHER GOVERNMENT

## 2025-04-23 VITALS
WEIGHT: 179 LBS | DIASTOLIC BLOOD PRESSURE: 82 MMHG | SYSTOLIC BLOOD PRESSURE: 136 MMHG | TEMPERATURE: 98 F | HEIGHT: 66 IN | OXYGEN SATURATION: 98 % | HEART RATE: 66 BPM | BODY MASS INDEX: 28.77 KG/M2

## 2025-04-23 DIAGNOSIS — H61.21 IMPACTED CERUMEN OF RIGHT EAR: Primary | ICD-10-CM

## 2025-04-23 NOTE — PROGRESS NOTES
"Chief Complaint  Pain (Right ear clogged.)    Subjective      Leandro Salcedo is a 65 y.o. male who presents to [facility]     History of Present Illness  The patient is a 65-year-old male presenting with a clogged right ear for 3 days.    Clogged Right Ear  - Reports blockage sensation  - Difficulty hearing  - Mild pain  - Debrox ear drops provided no relief  - Left ear unaffected  - Cleans outer ears with Q-tips  - Similar episode 10 years ago         Objective   Vital Signs:   Vitals:    04/23/25 1315   BP: 136/82   BP Location: Left arm   Patient Position: Sitting   Pulse: 66   Temp: 98 °F (36.7 °C)   TempSrc: Oral   SpO2: 98%   Weight: 81.2 kg (179 lb)   Height: 167.6 cm (66\")     Body mass index is 28.89 kg/m².    Wt Readings from Last 3 Encounters:   04/23/25 81.2 kg (179 lb)   03/31/25 86.7 kg (191 lb 3.2 oz)   03/11/25 86.1 kg (189 lb 12.8 oz)     BP Readings from Last 3 Encounters:   04/23/25 136/82   03/31/25 130/98   03/11/25 136/78       Health Maintenance   Topic Date Due    Pneumococcal Vaccine 50+ (2 of 2 - PCV) 09/23/2015    ANNUAL PHYSICAL  Never done    INFLUENZA VACCINE  07/01/2025    LIPID PANEL  12/18/2025    COLORECTAL CANCER SCREENING  04/18/2027    TDAP/TD VACCINES (3 - Td or Tdap) 11/29/2033    HEPATITIS C SCREENING  Completed    COVID-19 Vaccine  Completed    ZOSTER VACCINE  Completed       Physical Exam  HENT:      Head: Normocephalic and atraumatic.      Right Ear: There is impacted cerumen.      Left Ear: Tympanic membrane and ear canal normal.      Nose: Nose normal.      Mouth/Throat:      Mouth: Mucous membranes are moist.   Eyes:      Extraocular Movements: Extraocular movements intact.      Conjunctiva/sclera: Conjunctivae normal.   Cardiovascular:      Rate and Rhythm: Normal rate and regular rhythm.      Heart sounds: No murmur heard.     No friction rub. No gallop.   Pulmonary:      Effort: No respiratory distress.      Breath sounds: No wheezing, " rhonchi or rales.   Abdominal:      General: Abdomen is flat. There is no distension.   Musculoskeletal:         General: No swelling.      Cervical back: Neck supple.   Skin:     General: Skin is warm and dry.   Neurological:      General: No focal deficit present.      Mental Status: He is alert and oriented to person, place, and time.   Psychiatric:         Mood and Affect: Mood normal.         Behavior: Behavior normal.         Thought Content: Thought content normal.         Judgment: Judgment normal.          Physical Exam  Ears: Right ear clogged. Left ear normal.    Result Review :  The following data was reviewed by: Ramy Bobby DO on 04/23/2025:    No Images in the past 120 days found..     Results         Ear Cerumen Removal    Date/Time: 4/23/2025 2:13 PM    Performed by: Ramy Bobby DO  Authorized by: Ramy Bobby DO    Anesthesia:  Local Anesthetic: none  Location details: right ear  Patient tolerance: patient tolerated the procedure well with no immediate complications  Procedure type: irrigation   Sedation:  Patient sedated: no                  Diagnoses and all orders for this visit:    1. Impacted cerumen of right ear (Primary)  -     Ear Cerumen Removal         Assessment & Plan  1. Cerumen impaction, right ear.  - Reports clogged right ear with difficulty hearing and mild pain, using OTC ear drops for 3 days without improvement.  - Physical exam reveals cerumen not excessively hard and likely removable.  - Advised to continue Debrox or hydrogen peroxide with warm water to dissolve cerumen.  - After cerumen flush, TM was visualized which was normal.  Patient felt much better.              FOLLOW UP  Return for Next scheduled follow up.  Patient was given instructions and counseling regarding his condition or for health maintenance advice. Please see specific information pulled into the AVS if appropriate.     Patient or patient representative verbalized consent for the use of Ambient  Listening during the visit with  Ramy Bobby DO for chart documentation. 4/23/2025  14:14 EDT    Ramy Bobby DO  04/23/25  14:14 EDT    CURRENT & DISCONTINUED MEDICATIONS  Current Outpatient Medications   Medication Instructions    baclofen (LIORESAL) 10 MG tablet 1 tablet, 2 Times Daily    diclofenac (VOLTAREN) 75 MG EC tablet 1 tablet, 2 Times Daily    hydroCHLOROthiazide (HYDRODIURIL) 25 MG tablet No dose, route, or frequency recorded.    HYDROcodone-acetaminophen (Norco) 5-325 MG per tablet 1 tablet, Oral, Every 8 Hours PRN    lidocaine (LIDODERM) 5 %     lidocaine (XYLOCAINE) 5 % ointment     naloxone (NARCAN) 4 MG/0.1ML nasal spray CALL 911. SPR CONTENTS OF ONE SPRAYER (0.1ML) INTO ONE NOSTRIL. REPEAT IN 2-3 MIN IF SYMPTOMS OF OPIOID EMERGENCY PERSIST, ALTERNATE NOSTRILS    Nurtec 75 mg, Oral, Daily PRN    propranolol (INDERAL) 80 MG tablet     QUEtiapine (SEROQUEL) 25 mg, Oral, Nightly    simvastatin (ZOCOR) 40 MG tablet     SUMAtriptan (IMITREX) 100 MG tablet Take one tablet at onset of headache. May repeat dose one time in 2 hours if headache not relieved.    SUMAtriptan (IMITREX) 100 mg, Oral, See Admin Instructions    venlafaxine XR (EFFEXOR-XR) 75 mg, Oral, Daily       Medications Discontinued During This Encounter   Medication Reason    lisinopril (PRINIVIL,ZESTRIL) 40 MG tablet *Therapy completed    ondansetron ODT (ZOFRAN-ODT) 4 MG disintegrating tablet *Therapy completed

## 2025-05-07 ENCOUNTER — OFFICE VISIT (OUTPATIENT)
Dept: FAMILY MEDICINE CLINIC | Facility: CLINIC | Age: 65
End: 2025-05-07
Payer: OTHER GOVERNMENT

## 2025-05-07 VITALS
BODY MASS INDEX: 28.94 KG/M2 | WEIGHT: 180.1 LBS | HEIGHT: 66 IN | OXYGEN SATURATION: 95 % | DIASTOLIC BLOOD PRESSURE: 86 MMHG | TEMPERATURE: 98.2 F | SYSTOLIC BLOOD PRESSURE: 128 MMHG | HEART RATE: 60 BPM

## 2025-05-07 DIAGNOSIS — G47.00 INSOMNIA, UNSPECIFIED TYPE: Primary | ICD-10-CM

## 2025-05-07 DIAGNOSIS — M54.6 CHRONIC BILATERAL THORACIC BACK PAIN: ICD-10-CM

## 2025-05-07 DIAGNOSIS — G89.29 CHRONIC BILATERAL THORACIC BACK PAIN: ICD-10-CM

## 2025-05-07 DIAGNOSIS — G89.29 CHRONIC BILATERAL LOW BACK PAIN WITHOUT SCIATICA: ICD-10-CM

## 2025-05-07 DIAGNOSIS — G43.719 INTRACTABLE CHRONIC MIGRAINE WITHOUT AURA AND WITHOUT STATUS MIGRAINOSUS: ICD-10-CM

## 2025-05-07 DIAGNOSIS — M54.50 CHRONIC BILATERAL LOW BACK PAIN WITHOUT SCIATICA: ICD-10-CM

## 2025-05-07 DIAGNOSIS — H61.21 IMPACTED CERUMEN OF RIGHT EAR: ICD-10-CM

## 2025-05-07 DIAGNOSIS — I10 PRIMARY HYPERTENSION: ICD-10-CM

## 2025-05-07 PROCEDURE — 99214 OFFICE O/P EST MOD 30 MIN: CPT | Performed by: FAMILY MEDICINE

## 2025-05-07 RX ORDER — VENLAFAXINE HYDROCHLORIDE 150 MG/1
150 CAPSULE, EXTENDED RELEASE ORAL DAILY
Qty: 90 CAPSULE | Refills: 1 | Status: SHIPPED | OUTPATIENT
Start: 2025-05-07

## 2025-05-07 RX ORDER — QUETIAPINE FUMARATE 50 MG/1
50 TABLET, FILM COATED ORAL NIGHTLY
Qty: 90 TABLET | Refills: 1 | Status: SHIPPED | OUTPATIENT
Start: 2025-05-07

## 2025-05-07 RX ORDER — HYDROCODONE BITARTRATE AND ACETAMINOPHEN 5; 325 MG/1; MG/1
1 TABLET ORAL EVERY 8 HOURS PRN
Qty: 90 TABLET | Refills: 0 | Status: SHIPPED | OUTPATIENT
Start: 2025-05-07

## 2025-05-07 NOTE — PROGRESS NOTES
Chief Complaint  insomnia    Subjective          Leandro Salcedo presents to [facility]    History of Present Illness     History of Present Illness  The patient is a 65-year-old male who presents for follow-up.    He reports that his insomnia has shown some improvement with the use of Seroquel, although the symptoms have not been completely alleviated. He expresses a desire to increase the dosage of this medication. He was prescribed Phenergan at his last visit on 03/31/2025, which was not helpful for insomnia.    He has been receiving Botox injections from Jolynn Moreno for his migraines. He is also on a daily regimen of propranolol 160 mg and Effexor 75 mg for his migraines, which he reports as beneficial. He has been on Effexor for approximately 2 months. Currently, he experiences about 6 migraine episodes per month, a decrease from the previous frequency of 7 or 8 episodes. He notes a seasonal pattern to his migraines, with fewer episodes in the winter and an increase during the spring and summer, possibly due to pollen exposure.    He is currently on a daily regimen of hydrochlorothiazide 25 mg and propranolol 160 mg. He has discontinued the use of lisinopril, which he was previously taking at a dose of 40 mg.    He requests a refill of his [place] prescription for his low back and thoracic pain.    He had earwax removed from his right ear by Dr. Gilbert and it is doing better.         Current Outpatient Medications   Medication Instructions    baclofen (LIORESAL) 10 MG tablet 1 tablet, 2 Times Daily    diclofenac (VOLTAREN) 75 MG EC tablet 1 tablet, 2 Times Daily    hydroCHLOROthiazide (HYDRODIURIL) 25 MG tablet No dose, route, or frequency recorded.    HYDROcodone-acetaminophen (Norco) 5-325 MG per tablet 1 tablet, Oral, Every 8 Hours PRN    lidocaine (LIDODERM) 5 %     lidocaine (XYLOCAINE) 5 % ointment     naloxone (NARCAN) 4 MG/0.1ML nasal spray CALL 911. SPR CONTENTS OF ONE  "SPRAYER (0.1ML) INTO ONE NOSTRIL. REPEAT IN 2-3 MIN IF SYMPTOMS OF OPIOID EMERGENCY PERSIST, ALTERNATE NOSTRILS    Nurtec 75 mg, Oral, Daily PRN    propranolol (INDERAL) 80 MG tablet     QUEtiapine (SEROQUEL) 50 mg, Oral, Nightly    simvastatin (ZOCOR) 40 MG tablet     SUMAtriptan (IMITREX) 100 MG tablet Take one tablet at onset of headache. May repeat dose one time in 2 hours if headache not relieved.    SUMAtriptan (IMITREX) 100 mg, Oral, See Admin Instructions    venlafaxine XR (EFFEXOR XR) 150 mg, Oral, Daily       The following portions of the patient's history were reviewed and updated as appropriate: allergies, current medications, past family history, past medical history, past social history, past surgical history, and problem list.    Objective   Vital Signs:   /86   Pulse 60   Temp 98.2 °F (36.8 °C) (Oral)   Ht 167.6 cm (66\")   Wt 81.7 kg (180 lb 1.6 oz)   SpO2 95%   BMI 29.07 kg/m²     BP Readings from Last 3 Encounters:   05/07/25 128/86   04/23/25 136/82   03/31/25 130/98     Wt Readings from Last 3 Encounters:   05/07/25 81.7 kg (180 lb 1.6 oz)   04/23/25 81.2 kg (179 lb)   03/31/25 86.7 kg (191 lb 3.2 oz)           Physical Exam  Vitals reviewed.   Constitutional:       Appearance: Normal appearance.   HENT:      Head: Normocephalic and atraumatic.      Right Ear: External ear normal.      Left Ear: External ear normal.   Eyes:      Conjunctiva/sclera: Conjunctivae normal.   Cardiovascular:      Rate and Rhythm: Normal rate and regular rhythm.      Heart sounds: No murmur heard.     No friction rub. No gallop.   Pulmonary:      Effort: Pulmonary effort is normal.      Breath sounds: Normal breath sounds. No wheezing or rhonchi.   Abdominal:      General: Bowel sounds are normal. There is no distension.      Palpations: Abdomen is soft.      Tenderness: There is no abdominal tenderness.   Skin:     General: Skin is warm and dry.   Neurological:      Mental Status: He is alert and oriented " to person, place, and time.      Cranial Nerves: No cranial nerve deficit.   Psychiatric:         Mood and Affect: Mood and affect normal.         Behavior: Behavior normal.         Thought Content: Thought content normal.         Judgment: Judgment normal.            Result Review :   The following data was reviewed by: Christofer Lagos DO on 05/07/2025:  Common labs          12/18/2024    14:53   Common Labs   Glucose 92    BUN 28    Creatinine 1.42    Sodium 144    Potassium 3.6    Chloride 111    Calcium 9.4    Albumin 3.9    Total Bilirubin 0.3    Alkaline Phosphatase 64    AST (SGOT) 23    ALT (SGPT) 23    WBC 9.59    Hemoglobin 14.7    Hematocrit 43.5    Platelets 234    Total Cholesterol 175    Triglycerides 135    HDL Cholesterol 46    LDL Cholesterol  105    Hemoglobin A1C 5.90    PSA 0.443             Lab Results   Component Value Date    SARSANTIGEN Not Detected 12/09/2024    COVID19 Not Detected 12/08/2021    RAPFLUA Negative 11/28/2022    RAPFLUB Negative 11/28/2022    FLUAAG Not Detected 12/09/2024    FLUBAG Detected (A) 12/09/2024    RAPSCRN Negative 12/09/2024    BILIRUBINUR Negative 04/15/2024       Results  Labs   - [facility] drug test: 12/2024, Satisfactory    Procedures        Assessment and Plan    Diagnoses and all orders for this visit:    1. Insomnia, unspecified type (Primary)    2. Impacted cerumen of right ear    3. Intractable chronic migraine without aura and without status migrainosus    4. Chronic bilateral thoracic back pain  -     HYDROcodone-acetaminophen (Norco) 5-325 MG per tablet; Take 1 tablet by mouth Every 8 (Eight) Hours As Needed for Severe Pain.  Dispense: 90 tablet; Refill: 0    5. Chronic bilateral low back pain without sciatica  -     HYDROcodone-acetaminophen (Norco) 5-325 MG per tablet; Take 1 tablet by mouth Every 8 (Eight) Hours As Needed for Severe Pain.  Dispense: 90 tablet; Refill: 0    6. Primary hypertension    Other orders  -     QUEtiapine (SEROquel) 50 MG  tablet; Take 1 tablet by mouth Every Night.  Dispense: 90 tablet; Refill: 1  -     venlafaxine XR (Effexor XR) 150 MG 24 hr capsule; Take 1 capsule by mouth Daily.  Dispense: 90 capsule; Refill: 1        Assessment & Plan  1. Insomnia.  - Reports that Seroquel has helped somewhat but has not completely alleviated his symptoms.  - Physical examination findings indicate improvement in earwax removal.  - Discussion included the ineffectiveness of Phenergan for insomnia and the partial relief provided by Seroquel.  - The dosage of Seroquel will be increased to 50 mg and sent to pharmacy.    2. Migraines.  - Currently experiencing about six migraines per month, down from seven or eight.  - Taking propranolol 160 mg daily and Effexor 75 mg, which has helped reduce the frequency of migraines.  - Discussion included the potential triggers for migraines and the effectiveness of current medications.  - The dosage of Effexor will be increased to 150 mg to further manage his migraines.    3. Low back and thoracic pain.  - Prescription Drug Monitoring Program was reviewed.  - [facility] drug test was done in 12/2024 and results were satisfactory.  - Discussion included the need for a refill of Norco.  - A prescription for Norco will be refilled and sent to pharmacy.    4. Hypertension.  - Blood pressure is well-controlled on the current regimen.  - Taking hydrochlorothiazide 25 mg daily and propranolol 160 mg daily.  - Discussion included the effectiveness of the current medication regimen and the patient's weight loss.  - Will continue taking hydrochlorothiazide 25 mg daily and propranolol 160 mg daily.    Follow-up  The patient will follow up in 3 months.      See note for indication of controlled substance.  [place] and drug screen have been reviewed.  Controlled substance agreement signed and scanned into chart.  After discussion of risk and benefits of medication, I have determined the patient is suitable for Rx while  demonstrating the ability to safely follow and administer the medication plan.  Patient understands expectation that medication directions cannot be adjusted without a providers written approval.    Medications Discontinued During This Encounter   Medication Reason    QUEtiapine (SEROquel) 25 MG tablet     venlafaxine XR (EFFEXOR-XR) 75 MG 24 hr capsule     HYDROcodone-acetaminophen (Norco) 5-325 MG per tablet Reorder          Follow Up   Return in about 3 months (around 8/7/2025) for insomnia.  Patient was given instructions and counseling regarding his condition or for health maintenance advice. Please see specific information pulled into the AVS if appropriate.     Patient or patient representative verbalized consent for the use of Ambient Listening during the visit with  Christofer Lagos DO for chart documentation. 5/7/2025  15:08 EDT    Christofer Lagos DO  05/07/25  15:08 EDT

## 2025-05-14 ENCOUNTER — HOSPITAL ENCOUNTER (EMERGENCY)
Facility: HOSPITAL | Age: 65
Discharge: HOME | End: 2025-05-14
Attending: EMERGENCY MEDICINE | Admitting: EMERGENCY MEDICINE
Payer: OTHER GOVERNMENT

## 2025-05-14 ENCOUNTER — APPOINTMENT (OUTPATIENT)
Dept: GENERAL RADIOLOGY | Facility: HOSPITAL | Age: 65
End: 2025-05-14
Payer: OTHER GOVERNMENT

## 2025-05-14 VITALS
HEART RATE: 65 BPM | HEIGHT: 65 IN | BODY MASS INDEX: 28.32 KG/M2 | DIASTOLIC BLOOD PRESSURE: 77 MMHG | SYSTOLIC BLOOD PRESSURE: 140 MMHG | WEIGHT: 170 LBS | OXYGEN SATURATION: 96 % | TEMPERATURE: 97.7 F | RESPIRATION RATE: 18 BRPM

## 2025-05-14 DIAGNOSIS — R07.89 ATYPICAL CHEST PAIN: Primary | ICD-10-CM

## 2025-05-14 LAB
ALBUMIN SERPL-MCNC: 4.2 G/DL (ref 3.5–5.2)
ALBUMIN/GLOB SERPL: 1.8 G/DL
ALP SERPL-CCNC: 58 U/L (ref 39–117)
ALT SERPL W P-5'-P-CCNC: 14 U/L (ref 1–41)
ANION GAP SERPL CALCULATED.3IONS-SCNC: 12.5 MMOL/L (ref 5–15)
AST SERPL-CCNC: 17 U/L (ref 1–40)
BASOPHILS # BLD AUTO: 0.04 10*3/MM3 (ref 0–0.2)
BASOPHILS NFR BLD AUTO: 0.5 % (ref 0–1.5)
BILIRUB SERPL-MCNC: 0.5 MG/DL (ref 0–1.2)
BUN SERPL-MCNC: 21 MG/DL (ref 8–23)
BUN/CREAT SERPL: 21.9 (ref 7–25)
CALCIUM SPEC-SCNC: 9.5 MG/DL (ref 8.6–10.5)
CHLORIDE SERPL-SCNC: 104 MMOL/L (ref 98–107)
CO2 SERPL-SCNC: 24.5 MMOL/L (ref 22–29)
CREAT SERPL-MCNC: 0.96 MG/DL (ref 0.76–1.27)
DEPRECATED RDW RBC AUTO: 40.5 FL (ref 37–54)
EGFRCR SERPLBLD CKD-EPI 2021: 88.8 ML/MIN/1.73
EOSINOPHIL # BLD AUTO: 0 10*3/MM3 (ref 0–0.4)
EOSINOPHIL NFR BLD AUTO: 0 % (ref 0.3–6.2)
ERYTHROCYTE [DISTWIDTH] IN BLOOD BY AUTOMATED COUNT: 12.5 % (ref 12.3–15.4)
GEN 5 1HR TROPONIN T REFLEX: 11 NG/L
GLOBULIN UR ELPH-MCNC: 2.4 GM/DL
GLUCOSE SERPL-MCNC: 109 MG/DL (ref 65–99)
HCT VFR BLD AUTO: 42.7 % (ref 37.5–51)
HGB BLD-MCNC: 14.7 G/DL (ref 13–17.7)
HOLD SPECIMEN: NORMAL
HOLD SPECIMEN: NORMAL
IMM GRANULOCYTES # BLD AUTO: 0.01 10*3/MM3 (ref 0–0.05)
IMM GRANULOCYTES NFR BLD AUTO: 0.1 % (ref 0–0.5)
LIPASE SERPL-CCNC: 34 U/L (ref 13–60)
LYMPHOCYTES # BLD AUTO: 3.19 10*3/MM3 (ref 0.7–3.1)
LYMPHOCYTES NFR BLD AUTO: 39.7 % (ref 19.6–45.3)
MAGNESIUM SERPL-MCNC: 2 MG/DL (ref 1.6–2.4)
MCH RBC QN AUTO: 30.5 PG (ref 26.6–33)
MCHC RBC AUTO-ENTMCNC: 34.4 G/DL (ref 31.5–35.7)
MCV RBC AUTO: 88.6 FL (ref 79–97)
MONOCYTES # BLD AUTO: 0.59 10*3/MM3 (ref 0.1–0.9)
MONOCYTES NFR BLD AUTO: 7.3 % (ref 5–12)
NEUTROPHILS NFR BLD AUTO: 4.21 10*3/MM3 (ref 1.7–7)
NEUTROPHILS NFR BLD AUTO: 52.4 % (ref 42.7–76)
NRBC BLD AUTO-RTO: 0 /100 WBC (ref 0–0.2)
NT-PROBNP SERPL-MCNC: <36 PG/ML (ref 0–900)
PLATELET # BLD AUTO: 219 10*3/MM3 (ref 140–450)
PMV BLD AUTO: 10.6 FL (ref 6–12)
POTASSIUM SERPL-SCNC: 3.2 MMOL/L (ref 3.5–5.2)
PROT SERPL-MCNC: 6.6 G/DL (ref 6–8.5)
QT INTERVAL: 484 MS
QTC INTERVAL: 482 MS
RBC # BLD AUTO: 4.82 10*6/MM3 (ref 4.14–5.8)
SODIUM SERPL-SCNC: 141 MMOL/L (ref 136–145)
TROPONIN T NUMERIC DELTA: -2 NG/L
TROPONIN T SERPL HS-MCNC: 13 NG/L
WBC NRBC COR # BLD AUTO: 8.04 10*3/MM3 (ref 3.4–10.8)
WHOLE BLOOD HOLD COAG: NORMAL
WHOLE BLOOD HOLD SPECIMEN: NORMAL

## 2025-05-14 PROCEDURE — 25010000002 KETOROLAC TROMETHAMINE PER 15 MG: Performed by: EMERGENCY MEDICINE

## 2025-05-14 PROCEDURE — 99284 EMERGENCY DEPT VISIT MOD MDM: CPT

## 2025-05-14 PROCEDURE — 80053 COMPREHEN METABOLIC PANEL: CPT | Performed by: EMERGENCY MEDICINE

## 2025-05-14 PROCEDURE — 85025 COMPLETE CBC W/AUTO DIFF WBC: CPT | Performed by: EMERGENCY MEDICINE

## 2025-05-14 PROCEDURE — 83690 ASSAY OF LIPASE: CPT | Performed by: EMERGENCY MEDICINE

## 2025-05-14 PROCEDURE — 93005 ELECTROCARDIOGRAM TRACING: CPT | Performed by: EMERGENCY MEDICINE

## 2025-05-14 PROCEDURE — 96374 THER/PROPH/DIAG INJ IV PUSH: CPT

## 2025-05-14 PROCEDURE — 83735 ASSAY OF MAGNESIUM: CPT | Performed by: EMERGENCY MEDICINE

## 2025-05-14 PROCEDURE — 71045 X-RAY EXAM CHEST 1 VIEW: CPT

## 2025-05-14 PROCEDURE — 93005 ELECTROCARDIOGRAM TRACING: CPT

## 2025-05-14 PROCEDURE — 36415 COLL VENOUS BLD VENIPUNCTURE: CPT

## 2025-05-14 PROCEDURE — 83880 ASSAY OF NATRIURETIC PEPTIDE: CPT | Performed by: EMERGENCY MEDICINE

## 2025-05-14 PROCEDURE — 84484 ASSAY OF TROPONIN QUANT: CPT | Performed by: EMERGENCY MEDICINE

## 2025-05-14 RX ORDER — DICLOFENAC SODIUM 75 MG/1
75 TABLET, DELAYED RELEASE ORAL 2 TIMES DAILY
Qty: 14 TABLET | Refills: 0 | Status: SHIPPED | OUTPATIENT
Start: 2025-05-14

## 2025-05-14 RX ORDER — ASPIRIN 81 MG/1
324 TABLET, CHEWABLE ORAL ONCE
Status: DISCONTINUED | OUTPATIENT
Start: 2025-05-14 | End: 2025-05-14

## 2025-05-14 RX ORDER — SODIUM CHLORIDE 0.9 % (FLUSH) 0.9 %
10 SYRINGE (ML) INJECTION AS NEEDED
Status: DISCONTINUED | OUTPATIENT
Start: 2025-05-14 | End: 2025-05-14 | Stop reason: HOSPADM

## 2025-05-14 RX ORDER — KETOROLAC TROMETHAMINE 30 MG/ML
30 INJECTION, SOLUTION INTRAMUSCULAR; INTRAVENOUS ONCE
Status: COMPLETED | OUTPATIENT
Start: 2025-05-14 | End: 2025-05-14

## 2025-05-14 RX ADMIN — KETOROLAC TROMETHAMINE 30 MG: 30 INJECTION, SOLUTION INTRAMUSCULAR; INTRAVENOUS at 14:34

## 2025-05-14 NOTE — ED PROVIDER NOTES
Time: 2:13 PM EDT  Date of encounter:  5/14/2025  Independent Historian/Clinical History and Information was obtained by:   Patient  Chief Complaint: Chest pain    History is limited by: N/A    History of Present Illness:  Patient is a 65 y.o. year old male who presents to the emergency department for evaluation of chest pain.  Patient reports onset of pain was while he was at work today.  Patient denies any strenuous or physical activities at work.  Onset was around 11:30 AM.  Patient states the pain starts in the right side of his chest and radiates across into the left.  Additionally patient states that his right hand had some tingling sensations to it.  Patient reports that deep breathing makes the pain worse and shallow breathing makes the pain better.  Patient denies any fevers or chills.  Patient denies a cough.  Patient denies any vomiting.  Additionally he also denies any recent trips or travels.  Also no leg pain or swelling.  Patient denies history of any known coronary artery disease.    Patient Care Team  Primary Care Provider: Christofer Lagos DO    Past Medical History:     Allergies   Allergen Reactions    Tigan [Trimethobenzamide] Anaphylaxis     Past Medical History:   Diagnosis Date    Allergies     Arthritis     Condition not found     hernia    De Quervain's fracture of right wrist 07/25/2017    Headache, cluster     High blood pressure     High cholesterol     Migraine     Need for hepatitis C screening test 2017    neg per patient    Need for shingles vaccine     done    Primary osteoarthritis 07/25/2017    Right 1st [facility] primary osteoarthritis    Prostate cancer screening 06/12/2019    PSA 0.43    Renal calculus or stone      Past Surgical History:   Procedure Laterality Date    ANKLE SURGERY      COLONOSCOPY  2018    [facility]    COLONOSCOPY N/A 04/18/2022    Procedure: COLONOSCOPY FOR SCREENING;  Surgeon: Jackie Watson MD;  Location: [facility] ENDOSCOPY;  Service: Gastroenterology;   Laterality: N/A;  HEMORRHOIDS    EYE SURGERY      INGUINAL HERNIA REPAIR Right 04/07/2015    JOINT REPLACEMENT      LIPOMA EXCISION      OTHER SURGICAL HISTORY      stimulator in back for pain control    SHOULDER SURGERY      SPINAL FUSION      TONSILLECTOMY      TOTAL KNEE ARTHROPLASTY Left      Family History   Family history unknown: Yes       Home Medications:  Prior to Admission medications    Medication Sig Start Date End Date Taking? Authorizing Provider   baclofen (LIORESAL) 10 MG tablet Take 1 tablet by mouth 2 (Two) Times a Day.    Gil Spivey MD   diclofenac (VOLTAREN) 75 MG EC tablet Take 1 tablet by mouth 2 (Two) Times a Day.    Gil Spivey MD   hydroCHLOROthiazide (HYDRODIURIL) 25 MG tablet  10/26/21   Gil Spivey MD   HYDROcodone-acetaminophen (Norco) 5-325 MG per tablet Take 1 tablet by mouth Every 8 (Eight) Hours As Needed for Severe Pain. 5/7/25   Christofer Lagos DO   lidocaine (LIDODERM) 5 %     Gil Spivey MD   lidocaine (XYLOCAINE) 5 % ointment     Gil Spivey MD   naloxone (NARCAN) 4 MG/0.1ML nasal spray CALL 911. SPR CONTENTS OF ONE SPRAYER (0.1ML) INTO ONE NOSTRIL. REPEAT IN 2-3 MIN IF SYMPTOMS OF OPIOID EMERGENCY PERSIST, ALTERNATE NOSTRILS 3/7/25   Gil Spivey MD   propranolol (INDERAL) 80 MG tablet     Gil Spivey MD   QUEtiapine (SEROquel) 50 MG tablet Take 1 tablet by mouth Every Night. 5/7/25   Christofer Lagos DO   Rimegepant Sulfate (Nurtec) 75 MG tablet dispersible tablet Take 1 tablet by mouth Daily As Needed (migraine). 8/7/24   Jolynn Moreno APRN   simvastatin (ZOCOR) 40 MG tablet     Gil Spivey MD   SUMAtriptan (IMITREX) 100 MG tablet Take 1 tablet by mouth See Admin Instructions. 4/14/25   Jolynn Moreno APRN   SUMAtriptan (IMITREX) 100 MG tablet Take one tablet at onset of headache. May repeat dose one time in 2 hours if headache not relieved. 4/15/25   Jolynn Moreno APRN  "  venlafaxine XR (Effexor XR) 150 MG 24 hr capsule Take 1 capsule by mouth Daily. 5/7/25   Christofer Lagos DO        Social History:   Social History     Tobacco Use    Smoking status: Never     Passive exposure: Never    Smokeless tobacco: Never   Vaping Use    Vaping status: Never Used   Substance Use Topics    Alcohol use: Never    Drug use: Never         Review of Systems:  Review of Systems   Constitutional:  Negative for chills and fever.   HENT:  Negative for congestion, ear pain and sore throat.    Eyes:  Negative for pain.   Respiratory:  Negative for cough, chest tightness and shortness of breath.    Cardiovascular:  Positive for chest pain.   Gastrointestinal:  Negative for abdominal pain, diarrhea, nausea and vomiting.   Genitourinary:  Negative for flank pain and hematuria.   Musculoskeletal:  Negative for joint swelling.   Skin:  Negative for pallor.   Neurological:  Negative for seizures and headaches.   All other systems reviewed and are negative.       Physical Exam:  /77 (BP Location: Left arm, Patient Position: Lying)   Pulse 61   Temp 97.7 °F (36.5 °C) (Oral)   Resp 18   Ht 165.1 cm (65\")   Wt 77.1 kg (170 lb)   SpO2 95%   BMI 28.29 kg/m²     Physical Exam    Vital signs were reviewed under triage note.  General appearance - Patient appears well-developed and well-nourished.  Patient is in no acute distress.  Head - Normocephalic, atraumatic.  Pupils - Equal, round, reactive to light.  Extraocular muscles are intact.  Conjunctiva is clear.  Nasal - Normal inspection.  No evidence of trauma or epistaxis.  Tympanic membranes - Gray, intact without erythema or retractions.  Oral mucosa - Pink and moist without lesions or erythema.  Uvula is midline.  Chest wall - Atraumatic.  Chest wall has reproducible tenderness with palpation of the left anterior chest wall.  There are no vesicular rashes noted.  Neck - Supple.  Trachea was midline.  There is no palpable lymphadenopathy or " thyromegaly.  There are no meningeal signs  Lungs - Clear to auscultation and percussion bilaterally.  Heart - Regular rate and rhythm without any murmurs, clicks, or gallops.  Abdomen - Soft.  Bowel sounds are present.  There is no palpable tenderness.  There is no rebound, guarding, or rigidity.  There are no palpable masses.  There are no pulsatile masses.  Back - Spine is straight and midline.  There is no CVA tenderness.  Extremities - Intact x4 with full range of motion.  There is no palpable edema.  Pulses are intact x4 and equal.  Neurologic - Patient is awake, alert, and oriented x3.  Cranial nerves II through XII are grossly intact.  Motor and sensory functions grossly intact.  Cerebellar function was normal.  Integument - There are no rashes.  There are no petechia or purpura lesions noted.  There are no vesicular lesions noted.           Procedures:  Procedures      Medical Decision Making:      Comorbidities that affect care:    Migraine, arthritis, hypertension, kidney stones, hyperlipidemia    External Notes reviewed:    Previous Clinic Note: Office visit with Dr. Lagos on 5/7/2025 was reviewed by me.      The following orders were placed and all results were independently analyzed by me:  Orders Placed This Encounter   Procedures    XR Chest 1 View    [place] Draw    High Sensitivity Troponin T    Comprehensive Metabolic Panel    Lipase    BNP    Magnesium    CBC Auto Differential    High Sensitivity Troponin T 1Hr    NPO Diet NPO Type: Strict NPO    Undress & Gown    Continuous Pulse Oximetry    Oxygen Therapy- Nasal Cannula; Titrate 1-6 LPM Per SpO2; 90 - 95%    ECG 12 Lead Chest Pain    ECG 12 Lead ED Triage Standing Order; Chest Pain    ECG 12 Lead ED Triage Standing Order; Chest Pain    Insert Peripheral IV    CBC & Differential    Green Top (Gel)    Lavender Top    Gold Top - SST    Light Blue Top       Medications Given in the Emergency Department:  Medications   sodium chloride 0.9 %  flush 10 mL (has no administration in time range)   ketorolac (TORADOL) injection 30 mg (30 mg Intravenous Given 5/14/25 1434)        ED Course:    ED Course as of 05/14/25 1626   Wed May 14, 2025   1412 EKG performed at 1304 was interpreted by me to show a normal sinus rhythm with a ventricular rate of 59 bpm.  The GA interval was 176 ms.  P waves were normal.  QRS interval is borderline prolonged at 119 ms.  Patient has a nonspecific intraventricular conduction delay noted.  Axis is leftward at -6 degrees.  There was no acute ischemic ST or T wave change identified.  QT corrected is 482 ms. [TB]      ED Course User Index  [TB] Robi Mcmahon DO       The patient was seen and evaluated in the ED by me.  The above history and physical examination was performed as documented.  Diagnostic data was obtained.  Results reviewed.  Findings were discussed with the patient.  Patient's cardiac workup was negative including 2 negative troponins and his EKG.  Chest x-ray was also clear.  Patient's pain is improved after IV ketorolac.  I feel patient safe for discharge home with outpatient treatment and follow-up.  I will send in a prescription for some anti-inflammatory medications for him.    Labs:    Lab Results (last 24 hours)       Procedure Component Value Units Date/Time    High Sensitivity Troponin T [[id number]]  (Normal) Collected: 05/14/25 1338    Specimen: Blood Updated: 05/14/25 1407     HS Troponin T 13 ng/L     Narrative:      High Sensitive Troponin T Reference Range:  <14.0 ng/L- Negative Female for AMI  <22.0 ng/L- Negative Male for AMI  >=14 - Abnormal Female indicating possible myocardial injury.  >=22 - Abnormal Male indicating possible myocardial injury.   Clinicians would have to utilize clinical acumen, EKG, Troponin, and serial changes to determine if it is an Acute Myocardial Infarction or myocardial injury due to an underlying chronic condition.         CBC & Differential [[id number]]  (Abnormal)  Collected: 05/14/25 1338    Specimen: Blood Updated: 05/14/25 1345    Narrative:      The following orders were created for panel order CBC & Differential.  Procedure                               Abnormality         Status                     ---------                               -----------         ------                     CBC Auto Differential[[id number]]        Abnormal            Final result                 Please view results for these tests on the individual orders.    Comprehensive Metabolic Panel [[id number]]  (Abnormal) Collected: 05/14/25 1338    Specimen: Blood Updated: 05/14/25 1407     Glucose 109 mg/dL      BUN 21 mg/dL      Creatinine 0.96 mg/dL      Sodium 141 mmol/L      Potassium 3.2 mmol/L      Chloride 104 mmol/L      CO2 24.5 mmol/L      Calcium 9.5 mg/dL      Total Protein 6.6 g/dL      Albumin 4.2 g/dL      ALT (SGPT) 14 U/L      AST (SGOT) 17 U/L      Alkaline Phosphatase 58 U/L      Total Bilirubin 0.5 mg/dL      Globulin 2.4 gm/dL      A/G Ratio 1.8 g/dL      BUN/Creatinine Ratio 21.9     Anion Gap 12.5 mmol/L      eGFR 88.8 mL/min/1.73     Narrative:      GFR Categories in Chronic Kidney Disease (CKD)              GFR Category          GFR (mL/min/1.73)    Interpretation  G1                    90 or greater        Normal or high (1)  G2                    60-89                Mild decrease (1)  G3a                   45-59                Mild to moderate decrease  G3b                   30-44                Moderate to severe decrease  G4                    15-29                Severe decrease  G5                    14 or less           Kidney failure    (1)In the absence of evidence of kidney disease, neither GFR category G1 or G2 fulfill the criteria for CKD.    eGFR calculation 2021 CKD-EPI creatinine equation, which does not include race as a factor    Lipase [[id number]]  (Normal) Collected: 05/14/25 1338    Specimen: Blood Updated: 05/14/25 1407     Lipase 34 U/L     BNP [[id number]]   (Normal) Collected: 05/14/25 1338    Specimen: Blood Updated: 05/14/25 1404     proBNP <36.0 pg/mL     Narrative:      This assay is used as an aid in the diagnosis of individuals suspected of having heart failure. It can be used as an aid in the diagnosis of acute decompensated heart failure (ADHF) in patients presenting with signs and symptoms of ADHF to the emergency department (ED). In addition, NT-proBNP of <300 pg/mL indicates ADHF is not likely.    Age Range Result Interpretation  NT-proBNP Concentration (pg/mL:      <50             Positive            >450                   Gray                 300-450                    Negative             <300    50-75           Positive            >900                  Gray                300-900                  Negative            <300      >75             Positive            >1800                  Gray                300-1800                  Negative            <300    Magnesium [[id number]]  (Normal) Collected: 05/14/25 1338    Specimen: Blood Updated: 05/14/25 1407     Magnesium 2.0 mg/dL     CBC Auto Differential [[id number]]  (Abnormal) Collected: 05/14/25 1338    Specimen: Blood Updated: 05/14/25 1345     WBC 8.04 10*3/mm3      RBC 4.82 10*6/mm3      Hemoglobin 14.7 g/dL      Hematocrit 42.7 %      MCV 88.6 fL      MCH 30.5 pg      MCHC 34.4 g/dL      RDW 12.5 %      RDW-SD 40.5 fl      MPV 10.6 fL      Platelets 219 10*3/mm3      Neutrophil % 52.4 %      Lymphocyte % 39.7 %      Monocyte % 7.3 %      Eosinophil % 0.0 %      Basophil % 0.5 %      Immature Grans % 0.1 %      Neutrophils, Absolute 4.21 10*3/mm3      Lymphocytes, Absolute 3.19 10*3/mm3      Monocytes, Absolute 0.59 10*3/mm3      Eosinophils, Absolute 0.00 10*3/mm3      Basophils, Absolute 0.04 10*3/mm3      Immature Grans, Absolute 0.01 10*3/mm3      nRBC 0.0 /100 WBC     High Sensitivity Troponin T 1Hr [[id number]]  (Normal) Collected: 05/14/25 1431    Specimen: Blood Updated: 05/14/25 1457     HS  Troponin T 11 ng/L      Troponin T Numeric Delta -2 ng/L     Narrative:      High Sensitive Troponin T Reference Range:  <14.0 ng/L- Negative Female for AMI  <22.0 ng/L- Negative Male for AMI  >=14 - Abnormal Female indicating possible myocardial injury.  >=22 - Abnormal Male indicating possible myocardial injury.   Clinicians would have to utilize clinical acumen, EKG, Troponin, and serial changes to determine if it is an Acute Myocardial Infarction or myocardial injury due to an underlying chronic condition.                  Imaging:    XR Chest 1 View  Result Date: 5/14/2025  XR CHEST 1 VW Date of Exam: 5/14/2025 1:14 PM EDT Indication: Chest Pain Triage Protocol Comparison: Chest radiograph 2/24/2024. Findings: Cardiomediastinal silhouette is within normal limits. No focal consolidation. No pleural effusion or pneumothorax. Osseous structures are unremarkable.     Impression: No acute findings. Electronically Signed: Ramy Nicholson MD  5/14/2025 1:28 PM EDT  Workstation ID: [id number]        Differential Diagnosis and Discussion:    Chest Pain:  Based on the patient's signs and symptoms, I considered aortic dissection, myocardial infaction, pulmonary embolism, cardiac tamponade, pericarditis, pneumothorax, musculoskeletal chest pain and other differential diagnosis as an etiology of the patient's chest pain.     Labs were collected in the emergency department and all labs were reviewed and interpreted by me.  X-ray were performed in the emergency department and all X-ray impressions were independently interpreted by me.  An EKG was performed and the EKG was interpreted by me.    MDM     Amount and/or Complexity of Data Reviewed  Clinical lab tests: reviewed  Tests in the radiology section of CPT®: reviewed  Tests in the medicine section of CPT®: reviewed             Patient Care Considerations:    CT CHEST: I considered ordering a CT scan of the chest, however patient had a low probability Wells  score.      Consultants/Shared Management Plan:    None    Social Determinants of Health:    Patient is independent, reliable, and has access to care.       Disposition and Care Coordination:    Discharged: I considered escalation of care by admitting this patient to the hospital, however there were no acute findings to warrant inpatient workup.    I have explained the patient´s condition, diagnoses and treatment plan based on the information available to me at this time. I have answered questions and addressed any concerns. The patient has a good  understanding of the patient´s diagnosis, condition, and treatment plan as can be expected at this point. The vital signs have been stable. The patient´s condition is stable and appropriate for discharge from the emergency department.      The patient will pursue further outpatient evaluation with the primary care physician or other designated or consulting physician as outlined in the discharge instructions. They are agreeable to this plan of care and follow-up instructions have been explained in detail. The patient has received these instructions in written format and has expressed an understanding of the discharge instructions. The patient is aware that any significant change in condition or worsening of symptoms should prompt an immediate return to this or the closest emergency department or call to 911.    Final diagnoses:   Atypical chest pain        ED Disposition       ED Disposition   Discharge    Condition   Stable    Comment   --               This medical record created using voice recognition software.             Robi Mcmahon DO  05/16/25 [id number]

## 2025-05-14 NOTE — DISCHARGE INSTRUCTIONS
Avoid strenuous activities until symptoms have resolved.  Home medications as prescribed.  Take the diclofenac prescription as prescribed.  Take with food.  Take only as needed for pain.  Follow-up with your primary care provider in 1 week.  Discuss further outpatient workup as deemed necessary.  Return to the ER for any change or worsening pain, fever greater than 101, shortness of breath, unprovoked sweating, or any other concerns issues that may arise.

## 2025-05-16 ENCOUNTER — TELEPHONE (OUTPATIENT)
Dept: NEUROLOGY | Facility: CLINIC | Age: 65
End: 2025-05-16

## 2025-05-16 NOTE — TELEPHONE ENCOUNTER
The [facility] received a fax that requires your attention. The document has been indexed to the patient’s chart for your review.     Reason for sending: MEDICAL RECORDS NOTIFICATION     Documents Description: UPDATED NEUROLOGY VISIT AUTH_[facility]_05/14/25     Name of Sender: OSVALDO     Date Indexed: 05/16/25     Notes (if needed): AUTH# [id number] (FOR OV VISIT ON 07/25/25)

## 2025-05-28 LAB
QT INTERVAL: 484 MS
QTC INTERVAL: 482 MS

## 2025-06-12 DIAGNOSIS — G89.29 CHRONIC BILATERAL LOW BACK PAIN WITHOUT SCIATICA: ICD-10-CM

## 2025-06-12 DIAGNOSIS — M54.50 CHRONIC BILATERAL LOW BACK PAIN WITHOUT SCIATICA: ICD-10-CM

## 2025-06-12 DIAGNOSIS — M54.6 CHRONIC BILATERAL THORACIC BACK PAIN: ICD-10-CM

## 2025-06-12 DIAGNOSIS — G89.29 CHRONIC BILATERAL THORACIC BACK PAIN: ICD-10-CM

## 2025-06-12 RX ORDER — HYDROCODONE BITARTRATE AND ACETAMINOPHEN 5; 325 MG/1; MG/1
1 TABLET ORAL EVERY 8 HOURS PRN
Qty: 90 TABLET | Refills: 0 | Status: SHIPPED | OUTPATIENT
Start: 2025-06-12 | End: 2025-06-18 | Stop reason: SDUPTHER

## 2025-06-12 NOTE — TELEPHONE ENCOUNTER
Caller: Leandro Salcedo    Relationship: Self    Best call back number: [phone]     Requested Prescriptions:   Requested Prescriptions     Pending Prescriptions Disp Refills    HYDROcodone-acetaminophen (Norco) 5-325 MG per tablet 90 tablet 0     Sig: Take 1 tablet by mouth Every 8 (Eight) Hours As Needed for Severe Pain.        Pharmacy where request should be sent: [id number] - [place], [place] - [address] [facility] AT [address]/[facility] & [place] - [phone] [facility] [phone] FX     Last office visit with prescribing clinician: 5/7/2025   Last telemedicine visit with prescribing clinician: Visit date not found   Next office visit with prescribing clinician: 8/7/2025     Does the patient have less than a 3 day supply:  [x] Yes  [] No    Would you like a call back once the refill request has been completed: [] Yes [x] No    If the office needs to give you a call back, can they leave a voicemail: [] Yes [x] No    Brien Rodriguez   06/12/25 12:40 EDT

## 2025-06-18 DIAGNOSIS — G89.29 CHRONIC BILATERAL THORACIC BACK PAIN: ICD-10-CM

## 2025-06-18 DIAGNOSIS — M54.6 CHRONIC BILATERAL THORACIC BACK PAIN: ICD-10-CM

## 2025-06-18 DIAGNOSIS — G89.29 CHRONIC BILATERAL LOW BACK PAIN WITHOUT SCIATICA: ICD-10-CM

## 2025-06-18 DIAGNOSIS — M54.50 CHRONIC BILATERAL LOW BACK PAIN WITHOUT SCIATICA: ICD-10-CM

## 2025-06-18 RX ORDER — HYDROCODONE BITARTRATE AND ACETAMINOPHEN 5; 325 MG/1; MG/1
1 TABLET ORAL EVERY 8 HOURS PRN
Qty: 90 TABLET | Refills: 0 | Status: SHIPPED | OUTPATIENT
Start: 2025-06-18

## 2025-06-18 NOTE — TELEPHONE ENCOUNTER
Refill sent to the provider for the correct pharmacy at [facility]. Phone message left for patient regarding refill sent to provider for approval.

## 2025-07-15 RX ORDER — QUETIAPINE FUMARATE 50 MG/1
50 TABLET, FILM COATED ORAL NIGHTLY
Qty: 90 TABLET | Refills: 1 | Status: SHIPPED | OUTPATIENT
Start: 2025-07-15

## 2025-07-18 RX ORDER — QUETIAPINE FUMARATE 50 MG/1
50 TABLET, FILM COATED ORAL NIGHTLY
Qty: 90 TABLET | Refills: 1 | OUTPATIENT
Start: 2025-07-18

## 2025-07-25 ENCOUNTER — PROCEDURE VISIT (OUTPATIENT)
Dept: NEUROLOGY | Facility: CLINIC | Age: 65
End: 2025-07-25
Payer: OTHER GOVERNMENT

## 2025-07-25 DIAGNOSIS — G43.719 INTRACTABLE CHRONIC MIGRAINE WITHOUT AURA AND WITHOUT STATUS MIGRAINOSUS: Primary | ICD-10-CM

## 2025-07-28 DIAGNOSIS — M54.50 CHRONIC BILATERAL LOW BACK PAIN WITHOUT SCIATICA: ICD-10-CM

## 2025-07-28 DIAGNOSIS — G89.29 CHRONIC BILATERAL THORACIC BACK PAIN: ICD-10-CM

## 2025-07-28 DIAGNOSIS — M54.6 CHRONIC BILATERAL THORACIC BACK PAIN: ICD-10-CM

## 2025-07-28 DIAGNOSIS — G89.29 CHRONIC BILATERAL LOW BACK PAIN WITHOUT SCIATICA: ICD-10-CM

## 2025-07-28 RX ORDER — HYDROCODONE BITARTRATE AND ACETAMINOPHEN 5; 325 MG/1; MG/1
1 TABLET ORAL EVERY 8 HOURS PRN
Qty: 90 TABLET | Refills: 0 | Status: SHIPPED | OUTPATIENT
Start: 2025-07-28

## 2025-07-28 NOTE — TELEPHONE ENCOUNTER
Caller: Leandro Salcedo    Relationship: Self    Best call back number: [phone]    Requested Prescriptions:   Requested Prescriptions     Pending Prescriptions Disp Refills    HYDROcodone-acetaminophen (Norco) 5-325 MG per tablet 90 tablet 0     Sig: Take 1 tablet by mouth Every 8 (Eight) Hours As Needed for Severe Pain.        Pharmacy where request should be sent: [id number] - [place], [place] - [address] [facility] AT [address]/[facility] & [place] - [phone] [facility] [phone] FX     Last office visit with prescribing clinician: 5/7/2025   Last telemedicine visit with prescribing clinician: Visit date not found   Next office visit with prescribing clinician: 8/7/2025     Additional details provided by patient:     Does the patient have less than a 3 day supply:  [x] Yes  [] No      Brien Dupont Rep   07/28/25 08:31 EDT

## 2025-07-31 DIAGNOSIS — M25.511 RIGHT SHOULDER PAIN, UNSPECIFIED CHRONICITY: ICD-10-CM

## 2025-07-31 DIAGNOSIS — G56.01 CARPAL TUNNEL SYNDROME ON RIGHT: Primary | ICD-10-CM

## 2025-07-31 DIAGNOSIS — G56.02 CARPAL TUNNEL SYNDROME OF LEFT WRIST: ICD-10-CM

## 2025-08-29 ENCOUNTER — OFFICE VISIT (OUTPATIENT)
Dept: ORTHOPEDIC SURGERY | Facility: CLINIC | Age: 65
End: 2025-08-29
Payer: OTHER GOVERNMENT

## 2025-08-29 VITALS
WEIGHT: 171 LBS | HEIGHT: 66 IN | BODY MASS INDEX: 27.48 KG/M2 | DIASTOLIC BLOOD PRESSURE: 91 MMHG | SYSTOLIC BLOOD PRESSURE: 137 MMHG | HEART RATE: 60 BPM | OXYGEN SATURATION: 93 %

## 2025-08-29 DIAGNOSIS — M65.4 DE QUERVAIN'S TENOSYNOVITIS, LEFT: ICD-10-CM

## 2025-08-29 DIAGNOSIS — M65.4 DE QUERVAIN'S TENOSYNOVITIS, RIGHT: Primary | ICD-10-CM

## 2025-08-29 RX ORDER — LIDOCAINE HYDROCHLORIDE 10 MG/ML
1 INJECTION, SOLUTION INFILTRATION; PERINEURAL
Status: COMPLETED | OUTPATIENT
Start: 2025-08-29 | End: 2025-08-29

## 2025-08-29 RX ORDER — TRIAMCINOLONE ACETONIDE 40 MG/ML
40 INJECTION, SUSPENSION INTRA-ARTICULAR; INTRAMUSCULAR
Status: COMPLETED | OUTPATIENT
Start: 2025-08-29 | End: 2025-08-29

## 2025-08-29 RX ADMIN — LIDOCAINE HYDROCHLORIDE 1 ML: 10 INJECTION, SOLUTION INFILTRATION; PERINEURAL at 09:15

## 2025-08-29 RX ADMIN — TRIAMCINOLONE ACETONIDE 40 MG: 40 INJECTION, SUSPENSION INTRA-ARTICULAR; INTRAMUSCULAR at 09:15

## (undated) DEVICE — SOL IRRG H2O PL/BG 1000ML STRL

## (undated) DEVICE — Device: Brand: DEFENDO AIR/WATER/SUCTION AND BIOPSY VALVE

## (undated) DEVICE — COLON KIT: Brand: MEDLINE INDUSTRIES, INC.